# Patient Record
Sex: MALE | Race: OTHER | NOT HISPANIC OR LATINO | URBAN - METROPOLITAN AREA
[De-identification: names, ages, dates, MRNs, and addresses within clinical notes are randomized per-mention and may not be internally consistent; named-entity substitution may affect disease eponyms.]

---

## 2017-06-05 ENCOUNTER — OUTPATIENT (OUTPATIENT)
Dept: OUTPATIENT SERVICES | Facility: HOSPITAL | Age: 72
LOS: 1 days | Discharge: HOME | End: 2017-06-05

## 2017-06-05 ENCOUNTER — RESULT REVIEW (OUTPATIENT)
Age: 72
End: 2017-06-05

## 2017-06-05 RX ORDER — DOCUSATE SODIUM 100 MG
1 CAPSULE ORAL
Qty: 20 | Refills: 0 | OUTPATIENT
Start: 2017-06-05

## 2017-06-05 RX ORDER — SENNA PLUS 8.6 MG/1
1 TABLET ORAL
Qty: 20 | Refills: 0 | OUTPATIENT
Start: 2017-06-05

## 2017-06-07 DIAGNOSIS — D17.6 BENIGN LIPOMATOUS NEOPLASM OF SPERMATIC CORD: ICD-10-CM

## 2017-06-07 DIAGNOSIS — Z91.013 ALLERGY TO SEAFOOD: ICD-10-CM

## 2017-06-07 DIAGNOSIS — Z88.0 ALLERGY STATUS TO PENICILLIN: ICD-10-CM

## 2017-06-07 DIAGNOSIS — K21.9 GASTRO-ESOPHAGEAL REFLUX DISEASE WITHOUT ESOPHAGITIS: ICD-10-CM

## 2017-06-07 DIAGNOSIS — I10 ESSENTIAL (PRIMARY) HYPERTENSION: ICD-10-CM

## 2017-06-07 DIAGNOSIS — Z91.018 ALLERGY TO OTHER FOODS: ICD-10-CM

## 2017-06-07 DIAGNOSIS — K40.90 UNILATERAL INGUINAL HERNIA, WITHOUT OBSTRUCTION OR GANGRENE, NOT SPECIFIED AS RECURRENT: ICD-10-CM

## 2017-06-08 LAB — SURGICAL PATHOLOGY STUDY: SIGNIFICANT CHANGE UP

## 2022-01-01 ENCOUNTER — INPATIENT (INPATIENT)
Facility: HOSPITAL | Age: 77
LOS: 20 days | DRG: 435 | End: 2022-07-04
Attending: INTERNAL MEDICINE | Admitting: HOSPITALIST
Payer: MEDICARE

## 2022-01-01 ENCOUNTER — RESULT REVIEW (OUTPATIENT)
Age: 77
End: 2022-01-01

## 2022-01-01 VITALS
DIASTOLIC BLOOD PRESSURE: 70 MMHG | HEART RATE: 101 BPM | SYSTOLIC BLOOD PRESSURE: 100 MMHG | HEIGHT: 69 IN | WEIGHT: 199.08 LBS | TEMPERATURE: 98 F | RESPIRATION RATE: 20 BRPM | OXYGEN SATURATION: 96 %

## 2022-01-01 VITALS
RESPIRATION RATE: 22 BRPM | SYSTOLIC BLOOD PRESSURE: 128 MMHG | TEMPERATURE: 98 F | OXYGEN SATURATION: 88 % | HEART RATE: 102 BPM | DIASTOLIC BLOOD PRESSURE: 71 MMHG

## 2022-01-01 DIAGNOSIS — Z51.5 ENCOUNTER FOR PALLIATIVE CARE: ICD-10-CM

## 2022-01-01 DIAGNOSIS — Z98.890 OTHER SPECIFIED POSTPROCEDURAL STATES: Chronic | ICD-10-CM

## 2022-01-01 DIAGNOSIS — R53.81 OTHER MALAISE: ICD-10-CM

## 2022-01-01 DIAGNOSIS — C79.9 SECONDARY MALIGNANT NEOPLASM OF UNSPECIFIED SITE: ICD-10-CM

## 2022-01-01 DIAGNOSIS — Z71.89 OTHER SPECIFIED COUNSELING: ICD-10-CM

## 2022-01-01 DIAGNOSIS — R06.02 SHORTNESS OF BREATH: ICD-10-CM

## 2022-01-01 DIAGNOSIS — N17.9 ACUTE KIDNEY FAILURE, UNSPECIFIED: ICD-10-CM

## 2022-01-01 DIAGNOSIS — R79.89 OTHER SPECIFIED ABNORMAL FINDINGS OF BLOOD CHEMISTRY: ICD-10-CM

## 2022-01-01 DIAGNOSIS — I10 ESSENTIAL (PRIMARY) HYPERTENSION: ICD-10-CM

## 2022-01-01 DIAGNOSIS — I48.0 PAROXYSMAL ATRIAL FIBRILLATION: ICD-10-CM

## 2022-01-01 DIAGNOSIS — R06.00 DYSPNEA, UNSPECIFIED: ICD-10-CM

## 2022-01-01 LAB
AFP-TM SERPL-MCNC: 6.2 NG/ML — SIGNIFICANT CHANGE UP
ALBUMIN FLD-MCNC: 1.9 G/DL — SIGNIFICANT CHANGE UP
ALBUMIN SERPL ELPH-MCNC: 2.6 G/DL — LOW (ref 3.3–5)
ALBUMIN SERPL ELPH-MCNC: 3.2 G/DL — LOW (ref 3.3–5)
ALBUMIN SERPL ELPH-MCNC: 3.3 G/DL — SIGNIFICANT CHANGE UP (ref 3.3–5)
ALBUMIN SERPL ELPH-MCNC: 3.4 G/DL — SIGNIFICANT CHANGE UP (ref 3.3–5)
ALBUMIN SERPL ELPH-MCNC: 3.5 G/DL — SIGNIFICANT CHANGE UP (ref 3.3–5)
ALBUMIN SERPL ELPH-MCNC: 3.7 G/DL — SIGNIFICANT CHANGE UP (ref 3.3–5)
ALBUMIN SERPL ELPH-MCNC: 3.9 G/DL — SIGNIFICANT CHANGE UP (ref 3.3–5)
ALBUMIN SERPL ELPH-MCNC: 4 G/DL — SIGNIFICANT CHANGE UP (ref 3.3–5)
ALBUMIN SERPL ELPH-MCNC: 4.2 G/DL — SIGNIFICANT CHANGE UP (ref 3.3–5)
ALBUMIN SERPL ELPH-MCNC: 4.4 G/DL — SIGNIFICANT CHANGE UP (ref 3.3–5)
ALBUMIN SERPL ELPH-MCNC: 4.4 G/DL — SIGNIFICANT CHANGE UP (ref 3.3–5)
ALBUMIN SERPL ELPH-MCNC: 4.5 G/DL — SIGNIFICANT CHANGE UP (ref 3.3–5)
ALBUMIN SERPL ELPH-MCNC: 4.7 G/DL — SIGNIFICANT CHANGE UP (ref 3.3–5)
ALBUMIN SERPL ELPH-MCNC: 4.8 G/DL — SIGNIFICANT CHANGE UP (ref 3.3–5)
ALP SERPL-CCNC: 340 U/L — HIGH (ref 40–120)
ALP SERPL-CCNC: 419 U/L — HIGH (ref 40–120)
ALP SERPL-CCNC: 511 U/L — HIGH (ref 40–120)
ALP SERPL-CCNC: 543 U/L — HIGH (ref 40–120)
ALP SERPL-CCNC: 594 U/L — HIGH (ref 40–120)
ALP SERPL-CCNC: 597 U/L — HIGH (ref 40–120)
ALP SERPL-CCNC: 624 U/L — HIGH (ref 40–120)
ALP SERPL-CCNC: 644 U/L — HIGH (ref 40–120)
ALP SERPL-CCNC: 644 U/L — HIGH (ref 40–120)
ALP SERPL-CCNC: 646 U/L — HIGH (ref 40–120)
ALP SERPL-CCNC: 655 U/L — HIGH (ref 40–120)
ALP SERPL-CCNC: 657 U/L — HIGH (ref 40–120)
ALP SERPL-CCNC: 675 U/L — HIGH (ref 40–120)
ALP SERPL-CCNC: 690 U/L — HIGH (ref 40–120)
ALP SERPL-CCNC: 707 U/L — HIGH (ref 40–120)
ALP SERPL-CCNC: 711 U/L — HIGH (ref 40–120)
ALP SERPL-CCNC: 713 U/L — HIGH (ref 40–120)
ALP SERPL-CCNC: 727 U/L — HIGH (ref 40–120)
ALT FLD-CCNC: 105 U/L — HIGH (ref 10–45)
ALT FLD-CCNC: 106 U/L — HIGH (ref 10–45)
ALT FLD-CCNC: 117 U/L — HIGH (ref 10–45)
ALT FLD-CCNC: 122 U/L — HIGH (ref 10–45)
ALT FLD-CCNC: 58 U/L — HIGH (ref 10–45)
ALT FLD-CCNC: 66 U/L — HIGH (ref 10–45)
ALT FLD-CCNC: 67 U/L — HIGH (ref 10–45)
ALT FLD-CCNC: 69 U/L — HIGH (ref 10–45)
ALT FLD-CCNC: 71 U/L — HIGH (ref 10–45)
ALT FLD-CCNC: 72 U/L — HIGH (ref 10–45)
ALT FLD-CCNC: 74 U/L — HIGH (ref 10–45)
ALT FLD-CCNC: 79 U/L — HIGH (ref 10–45)
ALT FLD-CCNC: 80 U/L — HIGH (ref 10–45)
ALT FLD-CCNC: 80 U/L — HIGH (ref 10–45)
ALT FLD-CCNC: 86 U/L — HIGH (ref 10–45)
ALT FLD-CCNC: 93 U/L — HIGH (ref 10–45)
ALT FLD-CCNC: 96 U/L — HIGH (ref 10–45)
ALT FLD-CCNC: 97 U/L — HIGH (ref 10–45)
ANA TITR SER: NEGATIVE — SIGNIFICANT CHANGE UP
ANION GAP SERPL CALC-SCNC: 12 MMOL/L — SIGNIFICANT CHANGE UP (ref 5–17)
ANION GAP SERPL CALC-SCNC: 13 MMOL/L — SIGNIFICANT CHANGE UP (ref 5–17)
ANION GAP SERPL CALC-SCNC: 13 MMOL/L — SIGNIFICANT CHANGE UP (ref 5–17)
ANION GAP SERPL CALC-SCNC: 14 MMOL/L — SIGNIFICANT CHANGE UP (ref 5–17)
ANION GAP SERPL CALC-SCNC: 14 MMOL/L — SIGNIFICANT CHANGE UP (ref 5–17)
ANION GAP SERPL CALC-SCNC: 15 MMOL/L — SIGNIFICANT CHANGE UP (ref 5–17)
ANION GAP SERPL CALC-SCNC: 16 MMOL/L — SIGNIFICANT CHANGE UP (ref 5–17)
ANION GAP SERPL CALC-SCNC: 17 MMOL/L — SIGNIFICANT CHANGE UP (ref 5–17)
ANION GAP SERPL CALC-SCNC: 19 MMOL/L — HIGH (ref 5–17)
ANION GAP SERPL CALC-SCNC: 20 MMOL/L — HIGH (ref 5–17)
ANION GAP SERPL CALC-SCNC: 21 MMOL/L — HIGH (ref 5–17)
ANION GAP SERPL CALC-SCNC: 22 MMOL/L — HIGH (ref 5–17)
ANION GAP SERPL CALC-SCNC: 24 MMOL/L — HIGH (ref 5–17)
ANION GAP SERPL CALC-SCNC: 24 MMOL/L — HIGH (ref 5–17)
APPEARANCE UR: ABNORMAL
APTT BLD: 29.6 SEC — SIGNIFICANT CHANGE UP (ref 27.5–35.5)
APTT BLD: 37.3 SEC — HIGH (ref 27.5–35.5)
AST SERPL-CCNC: 107 U/L — HIGH (ref 10–40)
AST SERPL-CCNC: 111 U/L — HIGH (ref 10–40)
AST SERPL-CCNC: 113 U/L — HIGH (ref 10–40)
AST SERPL-CCNC: 114 U/L — HIGH (ref 10–40)
AST SERPL-CCNC: 114 U/L — HIGH (ref 10–40)
AST SERPL-CCNC: 119 U/L — HIGH (ref 10–40)
AST SERPL-CCNC: 120 U/L — HIGH (ref 10–40)
AST SERPL-CCNC: 133 U/L — HIGH (ref 10–40)
AST SERPL-CCNC: 143 U/L — HIGH (ref 10–40)
AST SERPL-CCNC: 150 U/L — HIGH (ref 10–40)
AST SERPL-CCNC: 171 U/L — HIGH (ref 10–40)
AST SERPL-CCNC: 177 U/L — HIGH (ref 10–40)
AST SERPL-CCNC: 179 U/L — HIGH (ref 10–40)
AST SERPL-CCNC: 196 U/L — HIGH (ref 10–40)
AST SERPL-CCNC: 217 U/L — HIGH (ref 10–40)
AST SERPL-CCNC: 90 U/L — HIGH (ref 10–40)
AST SERPL-CCNC: 91 U/L — HIGH (ref 10–40)
AST SERPL-CCNC: 98 U/L — HIGH (ref 10–40)
B PERT IGG+IGM PNL SER: CLEAR — SIGNIFICANT CHANGE UP
BACTERIA # UR AUTO: ABNORMAL
BACTERIA # UR AUTO: NEGATIVE — SIGNIFICANT CHANGE UP
BASOPHILS # BLD AUTO: 0.02 K/UL — SIGNIFICANT CHANGE UP (ref 0–0.2)
BASOPHILS # BLD AUTO: 0.03 K/UL — SIGNIFICANT CHANGE UP (ref 0–0.2)
BASOPHILS NFR BLD AUTO: 0.1 % — SIGNIFICANT CHANGE UP (ref 0–2)
BASOPHILS NFR BLD AUTO: 0.2 % — SIGNIFICANT CHANGE UP (ref 0–2)
BILIRUB DIRECT SERPL-MCNC: 1.6 MG/DL — HIGH (ref 0–0.3)
BILIRUB SERPL-MCNC: 1.4 MG/DL — HIGH (ref 0.2–1.2)
BILIRUB SERPL-MCNC: 1.8 MG/DL — HIGH (ref 0.2–1.2)
BILIRUB SERPL-MCNC: 10.3 MG/DL — HIGH (ref 0.2–1.2)
BILIRUB SERPL-MCNC: 11 MG/DL — HIGH (ref 0.2–1.2)
BILIRUB SERPL-MCNC: 11.1 MG/DL — HIGH (ref 0.2–1.2)
BILIRUB SERPL-MCNC: 12.1 MG/DL — HIGH (ref 0.2–1.2)
BILIRUB SERPL-MCNC: 12.4 MG/DL — HIGH (ref 0.2–1.2)
BILIRUB SERPL-MCNC: 2.2 MG/DL — HIGH (ref 0.2–1.2)
BILIRUB SERPL-MCNC: 2.4 MG/DL — HIGH (ref 0.2–1.2)
BILIRUB SERPL-MCNC: 2.6 MG/DL — HIGH (ref 0.2–1.2)
BILIRUB SERPL-MCNC: 2.8 MG/DL — HIGH (ref 0.2–1.2)
BILIRUB SERPL-MCNC: 2.8 MG/DL — HIGH (ref 0.2–1.2)
BILIRUB SERPL-MCNC: 3.2 MG/DL — HIGH (ref 0.2–1.2)
BILIRUB SERPL-MCNC: 4.2 MG/DL — HIGH (ref 0.2–1.2)
BILIRUB SERPL-MCNC: 5.2 MG/DL — HIGH (ref 0.2–1.2)
BILIRUB SERPL-MCNC: 6.8 MG/DL — HIGH (ref 0.2–1.2)
BILIRUB SERPL-MCNC: 7.2 MG/DL — HIGH (ref 0.2–1.2)
BILIRUB SERPL-MCNC: 8.7 MG/DL — HIGH (ref 0.2–1.2)
BILIRUB UR-MCNC: ABNORMAL
BILIRUB UR-MCNC: NEGATIVE — SIGNIFICANT CHANGE UP
BILIRUB UR-MCNC: NEGATIVE — SIGNIFICANT CHANGE UP
BLD GP AB SCN SERPL QL: NEGATIVE — SIGNIFICANT CHANGE UP
BUN SERPL-MCNC: 101 MG/DL — HIGH (ref 7–23)
BUN SERPL-MCNC: 102 MG/DL — HIGH (ref 7–23)
BUN SERPL-MCNC: 109 MG/DL — HIGH (ref 7–23)
BUN SERPL-MCNC: 126 MG/DL — HIGH (ref 7–23)
BUN SERPL-MCNC: 51 MG/DL — HIGH (ref 7–23)
BUN SERPL-MCNC: 52 MG/DL — HIGH (ref 7–23)
BUN SERPL-MCNC: 58 MG/DL — HIGH (ref 7–23)
BUN SERPL-MCNC: 60 MG/DL — HIGH (ref 7–23)
BUN SERPL-MCNC: 67 MG/DL — HIGH (ref 7–23)
BUN SERPL-MCNC: 69 MG/DL — HIGH (ref 7–23)
BUN SERPL-MCNC: 71 MG/DL — HIGH (ref 7–23)
BUN SERPL-MCNC: 71 MG/DL — HIGH (ref 7–23)
BUN SERPL-MCNC: 72 MG/DL — HIGH (ref 7–23)
BUN SERPL-MCNC: 72 MG/DL — HIGH (ref 7–23)
BUN SERPL-MCNC: 74 MG/DL — HIGH (ref 7–23)
BUN SERPL-MCNC: 75 MG/DL — HIGH (ref 7–23)
BUN SERPL-MCNC: 77 MG/DL — HIGH (ref 7–23)
BUN SERPL-MCNC: 78 MG/DL — HIGH (ref 7–23)
BUN SERPL-MCNC: 78 MG/DL — HIGH (ref 7–23)
BUN SERPL-MCNC: 81 MG/DL — HIGH (ref 7–23)
BUN SERPL-MCNC: 94 MG/DL — HIGH (ref 7–23)
BUN SERPL-MCNC: 99 MG/DL — HIGH (ref 7–23)
CALCIUM SERPL-MCNC: 10.1 MG/DL — SIGNIFICANT CHANGE UP (ref 8.4–10.5)
CALCIUM SERPL-MCNC: 10.2 MG/DL — SIGNIFICANT CHANGE UP (ref 8.4–10.5)
CALCIUM SERPL-MCNC: 8.7 MG/DL — SIGNIFICANT CHANGE UP (ref 8.4–10.5)
CALCIUM SERPL-MCNC: 8.9 MG/DL — SIGNIFICANT CHANGE UP (ref 8.4–10.5)
CALCIUM SERPL-MCNC: 8.9 MG/DL — SIGNIFICANT CHANGE UP (ref 8.4–10.5)
CALCIUM SERPL-MCNC: 9 MG/DL — SIGNIFICANT CHANGE UP (ref 8.4–10.5)
CALCIUM SERPL-MCNC: 9.1 MG/DL — SIGNIFICANT CHANGE UP (ref 8.4–10.5)
CALCIUM SERPL-MCNC: 9.1 MG/DL — SIGNIFICANT CHANGE UP (ref 8.4–10.5)
CALCIUM SERPL-MCNC: 9.2 MG/DL — SIGNIFICANT CHANGE UP (ref 8.4–10.5)
CALCIUM SERPL-MCNC: 9.2 MG/DL — SIGNIFICANT CHANGE UP (ref 8.4–10.5)
CALCIUM SERPL-MCNC: 9.3 MG/DL — SIGNIFICANT CHANGE UP (ref 8.4–10.5)
CALCIUM SERPL-MCNC: 9.3 MG/DL — SIGNIFICANT CHANGE UP (ref 8.4–10.5)
CALCIUM SERPL-MCNC: 9.4 MG/DL — SIGNIFICANT CHANGE UP (ref 8.4–10.5)
CALCIUM SERPL-MCNC: 9.4 MG/DL — SIGNIFICANT CHANGE UP (ref 8.4–10.5)
CALCIUM SERPL-MCNC: 9.5 MG/DL — SIGNIFICANT CHANGE UP (ref 8.4–10.5)
CALCIUM SERPL-MCNC: 9.5 MG/DL — SIGNIFICANT CHANGE UP (ref 8.4–10.5)
CALCIUM SERPL-MCNC: 9.8 MG/DL — SIGNIFICANT CHANGE UP (ref 8.4–10.5)
CALCIUM SERPL-MCNC: 9.9 MG/DL — SIGNIFICANT CHANGE UP (ref 8.4–10.5)
CALCIUM SERPL-MCNC: 9.9 MG/DL — SIGNIFICANT CHANGE UP (ref 8.4–10.5)
CANCER AG19-9 SERPL-ACNC: 1602 U/ML — HIGH
CEA SERPL-MCNC: 4.8 NG/ML — HIGH (ref 0–3.8)
CHLORIDE SERPL-SCNC: 100 MMOL/L — SIGNIFICANT CHANGE UP (ref 96–108)
CHLORIDE SERPL-SCNC: 100 MMOL/L — SIGNIFICANT CHANGE UP (ref 96–108)
CHLORIDE SERPL-SCNC: 103 MMOL/L — SIGNIFICANT CHANGE UP (ref 96–108)
CHLORIDE SERPL-SCNC: 103 MMOL/L — SIGNIFICANT CHANGE UP (ref 96–108)
CHLORIDE SERPL-SCNC: 104 MMOL/L — SIGNIFICANT CHANGE UP (ref 96–108)
CHLORIDE SERPL-SCNC: 104 MMOL/L — SIGNIFICANT CHANGE UP (ref 96–108)
CHLORIDE SERPL-SCNC: 105 MMOL/L — SIGNIFICANT CHANGE UP (ref 96–108)
CHLORIDE SERPL-SCNC: 105 MMOL/L — SIGNIFICANT CHANGE UP (ref 96–108)
CHLORIDE SERPL-SCNC: 88 MMOL/L — LOW (ref 96–108)
CHLORIDE SERPL-SCNC: 89 MMOL/L — LOW (ref 96–108)
CHLORIDE SERPL-SCNC: 90 MMOL/L — LOW (ref 96–108)
CHLORIDE SERPL-SCNC: 91 MMOL/L — LOW (ref 96–108)
CHLORIDE SERPL-SCNC: 92 MMOL/L — LOW (ref 96–108)
CHLORIDE SERPL-SCNC: 93 MMOL/L — LOW (ref 96–108)
CHLORIDE SERPL-SCNC: 95 MMOL/L — LOW (ref 96–108)
CHLORIDE SERPL-SCNC: 97 MMOL/L — SIGNIFICANT CHANGE UP (ref 96–108)
CHLORIDE SERPL-SCNC: 98 MMOL/L — SIGNIFICANT CHANGE UP (ref 96–108)
CHLORIDE SERPL-SCNC: 99 MMOL/L — SIGNIFICANT CHANGE UP (ref 96–108)
CHLORIDE UR-SCNC: 66 MMOL/L — SIGNIFICANT CHANGE UP
CHLORIDE UR-SCNC: <20 MMOL/L — SIGNIFICANT CHANGE UP
CMV DNA CSF QL NAA+PROBE: SIGNIFICANT CHANGE UP
CMV DNA SPEC NAA+PROBE-LOG#: SIGNIFICANT CHANGE UP LOG10IU/ML
CO2 SERPL-SCNC: 14 MMOL/L — LOW (ref 22–31)
CO2 SERPL-SCNC: 15 MMOL/L — LOW (ref 22–31)
CO2 SERPL-SCNC: 17 MMOL/L — LOW (ref 22–31)
CO2 SERPL-SCNC: 17 MMOL/L — LOW (ref 22–31)
CO2 SERPL-SCNC: 18 MMOL/L — LOW (ref 22–31)
CO2 SERPL-SCNC: 18 MMOL/L — LOW (ref 22–31)
CO2 SERPL-SCNC: 19 MMOL/L — LOW (ref 22–31)
CO2 SERPL-SCNC: 20 MMOL/L — LOW (ref 22–31)
CO2 SERPL-SCNC: 21 MMOL/L — LOW (ref 22–31)
CO2 SERPL-SCNC: 22 MMOL/L — SIGNIFICANT CHANGE UP (ref 22–31)
COLOR FLD: YELLOW — SIGNIFICANT CHANGE UP
COLOR SPEC: ABNORMAL
COLOR SPEC: YELLOW — SIGNIFICANT CHANGE UP
CREAT ?TM UR-MCNC: 102 MG/DL — SIGNIFICANT CHANGE UP
CREAT ?TM UR-MCNC: 116 MG/DL — SIGNIFICANT CHANGE UP
CREAT ?TM UR-MCNC: 72 MG/DL — SIGNIFICANT CHANGE UP
CREAT ?TM UR-MCNC: 81 MG/DL — SIGNIFICANT CHANGE UP
CREAT ?TM UR-MCNC: 91 MG/DL — SIGNIFICANT CHANGE UP
CREAT ?TM UR-MCNC: 91 MG/DL — SIGNIFICANT CHANGE UP
CREAT SERPL-MCNC: 1.75 MG/DL — HIGH (ref 0.5–1.3)
CREAT SERPL-MCNC: 1.77 MG/DL — HIGH (ref 0.5–1.3)
CREAT SERPL-MCNC: 1.97 MG/DL — HIGH (ref 0.5–1.3)
CREAT SERPL-MCNC: 2.06 MG/DL — HIGH (ref 0.5–1.3)
CREAT SERPL-MCNC: 2.06 MG/DL — HIGH (ref 0.5–1.3)
CREAT SERPL-MCNC: 2.13 MG/DL — HIGH (ref 0.5–1.3)
CREAT SERPL-MCNC: 2.17 MG/DL — HIGH (ref 0.5–1.3)
CREAT SERPL-MCNC: 2.37 MG/DL — HIGH (ref 0.5–1.3)
CREAT SERPL-MCNC: 2.41 MG/DL — HIGH (ref 0.5–1.3)
CREAT SERPL-MCNC: 2.68 MG/DL — HIGH (ref 0.5–1.3)
CREAT SERPL-MCNC: 2.71 MG/DL — HIGH (ref 0.5–1.3)
CREAT SERPL-MCNC: 2.75 MG/DL — HIGH (ref 0.5–1.3)
CREAT SERPL-MCNC: 2.77 MG/DL — HIGH (ref 0.5–1.3)
CREAT SERPL-MCNC: 2.8 MG/DL — HIGH (ref 0.5–1.3)
CREAT SERPL-MCNC: 2.84 MG/DL — HIGH (ref 0.5–1.3)
CREAT SERPL-MCNC: 2.87 MG/DL — HIGH (ref 0.5–1.3)
CREAT SERPL-MCNC: 3.08 MG/DL — HIGH (ref 0.5–1.3)
CREAT SERPL-MCNC: 3.12 MG/DL — HIGH (ref 0.5–1.3)
CREAT SERPL-MCNC: 3.16 MG/DL — HIGH (ref 0.5–1.3)
CREAT SERPL-MCNC: 3.37 MG/DL — HIGH (ref 0.5–1.3)
CREAT SERPL-MCNC: 3.62 MG/DL — HIGH (ref 0.5–1.3)
CREAT SERPL-MCNC: 3.65 MG/DL — HIGH (ref 0.5–1.3)
CULTURE RESULTS: SIGNIFICANT CHANGE UP
DIFF PNL FLD: NEGATIVE — SIGNIFICANT CHANGE UP
EBV EA AB SER IA-ACNC: <5 U/ML — SIGNIFICANT CHANGE UP
EBV EA AB TITR SER IF: POSITIVE
EBV EA IGG SER-ACNC: NEGATIVE — SIGNIFICANT CHANGE UP
EBV NA IGG SER IA-ACNC: >600 U/ML — HIGH
EBV PATRN SPEC IB-IMP: SIGNIFICANT CHANGE UP
EBV VCA IGG AVIDITY SER QL IA: POSITIVE
EBV VCA IGM SER IA-ACNC: 380 U/ML — HIGH
EBV VCA IGM SER IA-ACNC: <10 U/ML — SIGNIFICANT CHANGE UP
EBV VCA IGM TITR FLD: NEGATIVE — SIGNIFICANT CHANGE UP
EGFR: 16 ML/MIN/1.73M2 — LOW
EGFR: 17 ML/MIN/1.73M2 — LOW
EGFR: 18 ML/MIN/1.73M2 — LOW
EGFR: 19 ML/MIN/1.73M2 — LOW
EGFR: 20 ML/MIN/1.73M2 — LOW
EGFR: 20 ML/MIN/1.73M2 — LOW
EGFR: 22 ML/MIN/1.73M2 — LOW
EGFR: 22 ML/MIN/1.73M2 — LOW
EGFR: 23 ML/MIN/1.73M2 — LOW
EGFR: 24 ML/MIN/1.73M2 — LOW
EGFR: 27 ML/MIN/1.73M2 — LOW
EGFR: 28 ML/MIN/1.73M2 — LOW
EGFR: 31 ML/MIN/1.73M2 — LOW
EGFR: 31 ML/MIN/1.73M2 — LOW
EGFR: 33 ML/MIN/1.73M2 — LOW
EGFR: 33 ML/MIN/1.73M2 — LOW
EGFR: 34 ML/MIN/1.73M2 — LOW
EGFR: 39 ML/MIN/1.73M2 — LOW
EGFR: 40 ML/MIN/1.73M2 — LOW
EOSINOPHIL # BLD AUTO: 0.01 K/UL — SIGNIFICANT CHANGE UP (ref 0–0.5)
EOSINOPHIL # BLD AUTO: 0.09 K/UL — SIGNIFICANT CHANGE UP (ref 0–0.5)
EOSINOPHIL NFR BLD AUTO: 0.1 % — SIGNIFICANT CHANGE UP (ref 0–6)
EOSINOPHIL NFR BLD AUTO: 0.6 % — SIGNIFICANT CHANGE UP (ref 0–6)
EPI CELLS # UR: 0 /HPF — SIGNIFICANT CHANGE UP
EPI CELLS # UR: 1 /HPF — SIGNIFICANT CHANGE UP
EPI CELLS # UR: 1 /HPF — SIGNIFICANT CHANGE UP
FLUAV AG NPH QL: SIGNIFICANT CHANGE UP
FLUBV AG NPH QL: SIGNIFICANT CHANGE UP
FLUID INTAKE SUBSTANCE CLASS: SIGNIFICANT CHANGE UP
GLUCOSE BLDC GLUCOMTR-MCNC: 130 MG/DL — HIGH (ref 70–99)
GLUCOSE BLDC GLUCOMTR-MCNC: 132 MG/DL — HIGH (ref 70–99)
GLUCOSE BLDC GLUCOMTR-MCNC: 134 MG/DL — HIGH (ref 70–99)
GLUCOSE BLDC GLUCOMTR-MCNC: 135 MG/DL — HIGH (ref 70–99)
GLUCOSE BLDC GLUCOMTR-MCNC: 185 MG/DL — HIGH (ref 70–99)
GLUCOSE FLD-MCNC: 133 MG/DL — SIGNIFICANT CHANGE UP
GLUCOSE SERPL-MCNC: 111 MG/DL — HIGH (ref 70–99)
GLUCOSE SERPL-MCNC: 112 MG/DL — HIGH (ref 70–99)
GLUCOSE SERPL-MCNC: 116 MG/DL — HIGH (ref 70–99)
GLUCOSE SERPL-MCNC: 116 MG/DL — HIGH (ref 70–99)
GLUCOSE SERPL-MCNC: 118 MG/DL — HIGH (ref 70–99)
GLUCOSE SERPL-MCNC: 122 MG/DL — HIGH (ref 70–99)
GLUCOSE SERPL-MCNC: 123 MG/DL — HIGH (ref 70–99)
GLUCOSE SERPL-MCNC: 124 MG/DL — HIGH (ref 70–99)
GLUCOSE SERPL-MCNC: 125 MG/DL — HIGH (ref 70–99)
GLUCOSE SERPL-MCNC: 128 MG/DL — HIGH (ref 70–99)
GLUCOSE SERPL-MCNC: 130 MG/DL — HIGH (ref 70–99)
GLUCOSE SERPL-MCNC: 133 MG/DL — HIGH (ref 70–99)
GLUCOSE SERPL-MCNC: 147 MG/DL — HIGH (ref 70–99)
GLUCOSE SERPL-MCNC: 185 MG/DL — HIGH (ref 70–99)
GLUCOSE SERPL-MCNC: 210 MG/DL — HIGH (ref 70–99)
GLUCOSE SERPL-MCNC: 75 MG/DL — SIGNIFICANT CHANGE UP (ref 70–99)
GLUCOSE SERPL-MCNC: 79 MG/DL — SIGNIFICANT CHANGE UP (ref 70–99)
GLUCOSE SERPL-MCNC: 91 MG/DL — SIGNIFICANT CHANGE UP (ref 70–99)
GLUCOSE SERPL-MCNC: 93 MG/DL — SIGNIFICANT CHANGE UP (ref 70–99)
GLUCOSE SERPL-MCNC: 94 MG/DL — SIGNIFICANT CHANGE UP (ref 70–99)
GLUCOSE SERPL-MCNC: 96 MG/DL — SIGNIFICANT CHANGE UP (ref 70–99)
GLUCOSE SERPL-MCNC: 99 MG/DL — SIGNIFICANT CHANGE UP (ref 70–99)
GLUCOSE UR QL: ABNORMAL
GLUCOSE UR QL: NEGATIVE — SIGNIFICANT CHANGE UP
GRAM STN FLD: SIGNIFICANT CHANGE UP
GRAN CASTS # UR COMP ASSIST: 2 /LPF — SIGNIFICANT CHANGE UP
HAV IGM SER-ACNC: SIGNIFICANT CHANGE UP
HAV IGM SER-ACNC: SIGNIFICANT CHANGE UP
HBV CORE IGM SER-ACNC: SIGNIFICANT CHANGE UP
HBV CORE IGM SER-ACNC: SIGNIFICANT CHANGE UP
HBV SURFACE AG SER-ACNC: SIGNIFICANT CHANGE UP
HBV SURFACE AG SER-ACNC: SIGNIFICANT CHANGE UP
HCT VFR BLD CALC: 27.9 % — LOW (ref 39–50)
HCT VFR BLD CALC: 29.6 % — LOW (ref 39–50)
HCT VFR BLD CALC: 29.7 % — LOW (ref 39–50)
HCT VFR BLD CALC: 31.1 % — LOW (ref 39–50)
HCT VFR BLD CALC: 32.4 % — LOW (ref 39–50)
HCT VFR BLD CALC: 33.2 % — LOW (ref 39–50)
HCT VFR BLD CALC: 33.3 % — LOW (ref 39–50)
HCT VFR BLD CALC: 33.5 % — LOW (ref 39–50)
HCT VFR BLD CALC: 33.6 % — LOW (ref 39–50)
HCT VFR BLD CALC: 33.8 % — LOW (ref 39–50)
HCT VFR BLD CALC: 34.2 % — LOW (ref 39–50)
HCT VFR BLD CALC: 34.5 % — LOW (ref 39–50)
HCT VFR BLD CALC: 34.6 % — LOW (ref 39–50)
HCT VFR BLD CALC: 34.7 % — LOW (ref 39–50)
HCT VFR BLD CALC: 35 % — LOW (ref 39–50)
HCT VFR BLD CALC: 35.9 % — LOW (ref 39–50)
HCT VFR BLD CALC: 36.3 % — LOW (ref 39–50)
HCT VFR BLD CALC: 37.2 % — LOW (ref 39–50)
HCV AB S/CO SERPL IA: 0.08 S/CO — SIGNIFICANT CHANGE UP (ref 0–0.99)
HCV AB S/CO SERPL IA: 0.12 S/CO — SIGNIFICANT CHANGE UP (ref 0–0.99)
HCV AB SERPL-IMP: SIGNIFICANT CHANGE UP
HCV AB SERPL-IMP: SIGNIFICANT CHANGE UP
HGB BLD-MCNC: 10 G/DL — LOW (ref 13–17)
HGB BLD-MCNC: 10.1 G/DL — LOW (ref 13–17)
HGB BLD-MCNC: 10.6 G/DL — LOW (ref 13–17)
HGB BLD-MCNC: 10.9 G/DL — LOW (ref 13–17)
HGB BLD-MCNC: 11 G/DL — LOW (ref 13–17)
HGB BLD-MCNC: 11 G/DL — LOW (ref 13–17)
HGB BLD-MCNC: 11.1 G/DL — LOW (ref 13–17)
HGB BLD-MCNC: 11.2 G/DL — LOW (ref 13–17)
HGB BLD-MCNC: 11.2 G/DL — LOW (ref 13–17)
HGB BLD-MCNC: 11.4 G/DL — LOW (ref 13–17)
HGB BLD-MCNC: 11.5 G/DL — LOW (ref 13–17)
HGB BLD-MCNC: 11.6 G/DL — LOW (ref 13–17)
HGB BLD-MCNC: 11.8 G/DL — LOW (ref 13–17)
HGB BLD-MCNC: 12.1 G/DL — LOW (ref 13–17)
HGB BLD-MCNC: 9.2 G/DL — LOW (ref 13–17)
HGB BLD-MCNC: 9.7 G/DL — LOW (ref 13–17)
HSV DNA1: SIGNIFICANT CHANGE UP
HSV DNA2: SIGNIFICANT CHANGE UP
HSV1 DNA BLD QL NAA+PROBE: SIGNIFICANT CHANGE UP
HSV2 DNA BLD QL NAA+PROBE: SIGNIFICANT CHANGE UP
HYALINE CASTS # UR AUTO: 1 /LPF — SIGNIFICANT CHANGE UP (ref 0–7)
HYALINE CASTS # UR AUTO: 18 /LPF — HIGH (ref 0–2)
HYALINE CASTS # UR AUTO: 23 /LPF — HIGH (ref 0–2)
HYALINE CASTS # UR AUTO: 4 /LPF — HIGH (ref 0–2)
HYALINE CASTS # UR AUTO: 6 /LPF — SIGNIFICANT CHANGE UP (ref 0–7)
IMM GRANULOCYTES NFR BLD AUTO: 0.6 % — SIGNIFICANT CHANGE UP (ref 0–1.5)
IMM GRANULOCYTES NFR BLD AUTO: 0.9 % — SIGNIFICANT CHANGE UP (ref 0–1.5)
INR BLD: 1.4 RATIO — HIGH (ref 0.88–1.16)
INR BLD: 1.42 RATIO — HIGH (ref 0.88–1.16)
INR BLD: 1.43 RATIO — HIGH (ref 0.88–1.16)
INR BLD: 1.52 RATIO — HIGH (ref 0.88–1.16)
INR BLD: 1.58 RATIO — HIGH (ref 0.88–1.16)
INR BLD: 1.66 RATIO — HIGH (ref 0.88–1.16)
INR BLD: 1.67 RATIO — HIGH (ref 0.88–1.16)
INR BLD: 2.51 RATIO — HIGH (ref 0.88–1.16)
INR BLD: 3.01 RATIO — HIGH (ref 0.88–1.16)
INR BLD: 3.32 RATIO — HIGH (ref 0.88–1.16)
INR BLD: 3.64 RATIO — HIGH (ref 0.88–1.16)
INR BLD: 3.96 RATIO — HIGH (ref 0.88–1.16)
KETONES UR-MCNC: NEGATIVE — SIGNIFICANT CHANGE UP
LDH SERPL L TO P-CCNC: 278 U/L — SIGNIFICANT CHANGE UP
LEUKOCYTE ESTERASE UR-ACNC: NEGATIVE — SIGNIFICANT CHANGE UP
LYMPHOCYTES # BLD AUTO: 0.48 K/UL — LOW (ref 1–3.3)
LYMPHOCYTES # BLD AUTO: 0.64 K/UL — LOW (ref 1–3.3)
LYMPHOCYTES # BLD AUTO: 2.9 % — LOW (ref 13–44)
LYMPHOCYTES # BLD AUTO: 3.9 % — LOW (ref 13–44)
LYMPHOCYTES # FLD: 88 % — SIGNIFICANT CHANGE UP
MAGNESIUM SERPL-MCNC: 2.1 MG/DL — SIGNIFICANT CHANGE UP (ref 1.6–2.6)
MAGNESIUM SERPL-MCNC: 2.2 MG/DL — SIGNIFICANT CHANGE UP (ref 1.6–2.6)
MAGNESIUM SERPL-MCNC: 2.2 MG/DL — SIGNIFICANT CHANGE UP (ref 1.6–2.6)
MCHC RBC-ENTMCNC: 30 PG — SIGNIFICANT CHANGE UP (ref 27–34)
MCHC RBC-ENTMCNC: 30.1 PG — SIGNIFICANT CHANGE UP (ref 27–34)
MCHC RBC-ENTMCNC: 30.2 PG — SIGNIFICANT CHANGE UP (ref 27–34)
MCHC RBC-ENTMCNC: 30.3 PG — SIGNIFICANT CHANGE UP (ref 27–34)
MCHC RBC-ENTMCNC: 30.4 PG — SIGNIFICANT CHANGE UP (ref 27–34)
MCHC RBC-ENTMCNC: 30.4 PG — SIGNIFICANT CHANGE UP (ref 27–34)
MCHC RBC-ENTMCNC: 30.5 PG — SIGNIFICANT CHANGE UP (ref 27–34)
MCHC RBC-ENTMCNC: 30.6 PG — SIGNIFICANT CHANGE UP (ref 27–34)
MCHC RBC-ENTMCNC: 30.6 PG — SIGNIFICANT CHANGE UP (ref 27–34)
MCHC RBC-ENTMCNC: 30.7 PG — SIGNIFICANT CHANGE UP (ref 27–34)
MCHC RBC-ENTMCNC: 30.7 PG — SIGNIFICANT CHANGE UP (ref 27–34)
MCHC RBC-ENTMCNC: 30.8 PG — SIGNIFICANT CHANGE UP (ref 27–34)
MCHC RBC-ENTMCNC: 30.9 PG — SIGNIFICANT CHANGE UP (ref 27–34)
MCHC RBC-ENTMCNC: 32.3 GM/DL — SIGNIFICANT CHANGE UP (ref 32–36)
MCHC RBC-ENTMCNC: 32.5 GM/DL — SIGNIFICANT CHANGE UP (ref 32–36)
MCHC RBC-ENTMCNC: 32.7 GM/DL — SIGNIFICANT CHANGE UP (ref 32–36)
MCHC RBC-ENTMCNC: 32.8 GM/DL — SIGNIFICANT CHANGE UP (ref 32–36)
MCHC RBC-ENTMCNC: 32.8 GM/DL — SIGNIFICANT CHANGE UP (ref 32–36)
MCHC RBC-ENTMCNC: 32.9 GM/DL — SIGNIFICANT CHANGE UP (ref 32–36)
MCHC RBC-ENTMCNC: 33 GM/DL — SIGNIFICANT CHANGE UP (ref 32–36)
MCHC RBC-ENTMCNC: 33.1 GM/DL — SIGNIFICANT CHANGE UP (ref 32–36)
MCHC RBC-ENTMCNC: 33.3 GM/DL — SIGNIFICANT CHANGE UP (ref 32–36)
MCHC RBC-ENTMCNC: 33.4 GM/DL — SIGNIFICANT CHANGE UP (ref 32–36)
MCHC RBC-ENTMCNC: 33.7 GM/DL — SIGNIFICANT CHANGE UP (ref 32–36)
MCV RBC AUTO: 91.4 FL — SIGNIFICANT CHANGE UP (ref 80–100)
MCV RBC AUTO: 91.4 FL — SIGNIFICANT CHANGE UP (ref 80–100)
MCV RBC AUTO: 91.8 FL — SIGNIFICANT CHANGE UP (ref 80–100)
MCV RBC AUTO: 91.8 FL — SIGNIFICANT CHANGE UP (ref 80–100)
MCV RBC AUTO: 92 FL — SIGNIFICANT CHANGE UP (ref 80–100)
MCV RBC AUTO: 92 FL — SIGNIFICANT CHANGE UP (ref 80–100)
MCV RBC AUTO: 92.2 FL — SIGNIFICANT CHANGE UP (ref 80–100)
MCV RBC AUTO: 92.2 FL — SIGNIFICANT CHANGE UP (ref 80–100)
MCV RBC AUTO: 92.5 FL — SIGNIFICANT CHANGE UP (ref 80–100)
MCV RBC AUTO: 92.8 FL — SIGNIFICANT CHANGE UP (ref 80–100)
MCV RBC AUTO: 92.8 FL — SIGNIFICANT CHANGE UP (ref 80–100)
MCV RBC AUTO: 93 FL — SIGNIFICANT CHANGE UP (ref 80–100)
MCV RBC AUTO: 93.1 FL — SIGNIFICANT CHANGE UP (ref 80–100)
MCV RBC AUTO: 93.1 FL — SIGNIFICANT CHANGE UP (ref 80–100)
MCV RBC AUTO: 93.7 FL — SIGNIFICANT CHANGE UP (ref 80–100)
MCV RBC AUTO: 93.7 FL — SIGNIFICANT CHANGE UP (ref 80–100)
MCV RBC AUTO: 94 FL — SIGNIFICANT CHANGE UP (ref 80–100)
MCV RBC AUTO: 94 FL — SIGNIFICANT CHANGE UP (ref 80–100)
MESOTHL CELL # FLD: 7 % — SIGNIFICANT CHANGE UP
MITOCHONDRIA AB SER-ACNC: SIGNIFICANT CHANGE UP
MONOCYTES # BLD AUTO: 1.11 K/UL — HIGH (ref 0–0.9)
MONOCYTES # BLD AUTO: 1.16 K/UL — HIGH (ref 0–0.9)
MONOCYTES NFR BLD AUTO: 6.8 % — SIGNIFICANT CHANGE UP (ref 2–14)
MONOCYTES NFR BLD AUTO: 7.1 % — SIGNIFICANT CHANGE UP (ref 2–14)
MONOS+MACROS # FLD: 5 % — SIGNIFICANT CHANGE UP
MRSA PCR RESULT.: SIGNIFICANT CHANGE UP
NEUTROPHILS # BLD AUTO: 14.4 K/UL — HIGH (ref 1.8–7.4)
NEUTROPHILS # BLD AUTO: 14.42 K/UL — HIGH (ref 1.8–7.4)
NEUTROPHILS NFR BLD AUTO: 88.4 % — HIGH (ref 43–77)
NEUTROPHILS NFR BLD AUTO: 88.4 % — HIGH (ref 43–77)
NEUTROPHILS-BODY FLUID: 2 % — SIGNIFICANT CHANGE UP
NITRITE UR-MCNC: NEGATIVE — SIGNIFICANT CHANGE UP
NON-GYNECOLOGICAL CYTOLOGY STUDY: SIGNIFICANT CHANGE UP
NON-GYNECOLOGICAL CYTOLOGY STUDY: SIGNIFICANT CHANGE UP
NRBC # BLD: 0 /100 WBCS — SIGNIFICANT CHANGE UP (ref 0–0)
NT-PROBNP SERPL-SCNC: 8026 PG/ML — HIGH (ref 0–300)
OSMOLALITY UR: 385 MOS/KG — SIGNIFICANT CHANGE UP (ref 300–900)
OSMOLALITY UR: 397 MOS/KG — SIGNIFICANT CHANGE UP (ref 300–900)
OSMOLALITY UR: 428 MOS/KG — SIGNIFICANT CHANGE UP (ref 300–900)
OSMOLALITY UR: 569 MOS/KG — SIGNIFICANT CHANGE UP (ref 300–900)
OTHER CELLS FLD MANUAL: 5 % — SIGNIFICANT CHANGE UP
PH UR: 5 — SIGNIFICANT CHANGE UP (ref 5–8)
PH UR: 5.5 — SIGNIFICANT CHANGE UP (ref 5–8)
PHOSPHATE SERPL-MCNC: 2.3 MG/DL — LOW (ref 2.5–4.5)
PHOSPHATE SERPL-MCNC: 2.5 MG/DL — SIGNIFICANT CHANGE UP (ref 2.5–4.5)
PHOSPHATE SERPL-MCNC: 3.5 MG/DL — SIGNIFICANT CHANGE UP (ref 2.5–4.5)
PHOSPHATE SERPL-MCNC: 4 MG/DL — SIGNIFICANT CHANGE UP (ref 2.5–4.5)
PLATELET # BLD AUTO: 168 K/UL — SIGNIFICANT CHANGE UP (ref 150–400)
PLATELET # BLD AUTO: 170 K/UL — SIGNIFICANT CHANGE UP (ref 150–400)
PLATELET # BLD AUTO: 176 K/UL — SIGNIFICANT CHANGE UP (ref 150–400)
PLATELET # BLD AUTO: 177 K/UL — SIGNIFICANT CHANGE UP (ref 150–400)
PLATELET # BLD AUTO: 179 K/UL — SIGNIFICANT CHANGE UP (ref 150–400)
PLATELET # BLD AUTO: 182 K/UL — SIGNIFICANT CHANGE UP (ref 150–400)
PLATELET # BLD AUTO: 187 K/UL — SIGNIFICANT CHANGE UP (ref 150–400)
PLATELET # BLD AUTO: 193 K/UL — SIGNIFICANT CHANGE UP (ref 150–400)
PLATELET # BLD AUTO: 202 K/UL — SIGNIFICANT CHANGE UP (ref 150–400)
PLATELET # BLD AUTO: 204 K/UL — SIGNIFICANT CHANGE UP (ref 150–400)
PLATELET # BLD AUTO: 213 K/UL — SIGNIFICANT CHANGE UP (ref 150–400)
PLATELET # BLD AUTO: 219 K/UL — SIGNIFICANT CHANGE UP (ref 150–400)
PLATELET # BLD AUTO: 223 K/UL — SIGNIFICANT CHANGE UP (ref 150–400)
PLATELET # BLD AUTO: 224 K/UL — SIGNIFICANT CHANGE UP (ref 150–400)
PLATELET # BLD AUTO: 224 K/UL — SIGNIFICANT CHANGE UP (ref 150–400)
PLATELET # BLD AUTO: 231 K/UL — SIGNIFICANT CHANGE UP (ref 150–400)
PLATELET # BLD AUTO: 239 K/UL — SIGNIFICANT CHANGE UP (ref 150–400)
PLATELET # BLD AUTO: 273 K/UL — SIGNIFICANT CHANGE UP (ref 150–400)
POTASSIUM SERPL-MCNC: 4.3 MMOL/L — SIGNIFICANT CHANGE UP (ref 3.5–5.3)
POTASSIUM SERPL-MCNC: 4.4 MMOL/L — SIGNIFICANT CHANGE UP (ref 3.5–5.3)
POTASSIUM SERPL-MCNC: 4.4 MMOL/L — SIGNIFICANT CHANGE UP (ref 3.5–5.3)
POTASSIUM SERPL-MCNC: 4.5 MMOL/L — SIGNIFICANT CHANGE UP (ref 3.5–5.3)
POTASSIUM SERPL-MCNC: 4.6 MMOL/L — SIGNIFICANT CHANGE UP (ref 3.5–5.3)
POTASSIUM SERPL-MCNC: 4.7 MMOL/L — SIGNIFICANT CHANGE UP (ref 3.5–5.3)
POTASSIUM SERPL-MCNC: 4.7 MMOL/L — SIGNIFICANT CHANGE UP (ref 3.5–5.3)
POTASSIUM SERPL-MCNC: 4.8 MMOL/L — SIGNIFICANT CHANGE UP (ref 3.5–5.3)
POTASSIUM SERPL-MCNC: 5.2 MMOL/L — SIGNIFICANT CHANGE UP (ref 3.5–5.3)
POTASSIUM SERPL-MCNC: 5.3 MMOL/L — SIGNIFICANT CHANGE UP (ref 3.5–5.3)
POTASSIUM SERPL-MCNC: 5.4 MMOL/L — HIGH (ref 3.5–5.3)
POTASSIUM SERPL-MCNC: 5.6 MMOL/L — HIGH (ref 3.5–5.3)
POTASSIUM SERPL-MCNC: 5.7 MMOL/L — HIGH (ref 3.5–5.3)
POTASSIUM SERPL-MCNC: 5.7 MMOL/L — HIGH (ref 3.5–5.3)
POTASSIUM SERPL-MCNC: 5.8 MMOL/L — HIGH (ref 3.5–5.3)
POTASSIUM SERPL-MCNC: 5.9 MMOL/L — HIGH (ref 3.5–5.3)
POTASSIUM SERPL-SCNC: 4.3 MMOL/L — SIGNIFICANT CHANGE UP (ref 3.5–5.3)
POTASSIUM SERPL-SCNC: 4.4 MMOL/L — SIGNIFICANT CHANGE UP (ref 3.5–5.3)
POTASSIUM SERPL-SCNC: 4.4 MMOL/L — SIGNIFICANT CHANGE UP (ref 3.5–5.3)
POTASSIUM SERPL-SCNC: 4.5 MMOL/L — SIGNIFICANT CHANGE UP (ref 3.5–5.3)
POTASSIUM SERPL-SCNC: 4.6 MMOL/L — SIGNIFICANT CHANGE UP (ref 3.5–5.3)
POTASSIUM SERPL-SCNC: 4.7 MMOL/L — SIGNIFICANT CHANGE UP (ref 3.5–5.3)
POTASSIUM SERPL-SCNC: 4.7 MMOL/L — SIGNIFICANT CHANGE UP (ref 3.5–5.3)
POTASSIUM SERPL-SCNC: 4.8 MMOL/L — SIGNIFICANT CHANGE UP (ref 3.5–5.3)
POTASSIUM SERPL-SCNC: 5.2 MMOL/L — SIGNIFICANT CHANGE UP (ref 3.5–5.3)
POTASSIUM SERPL-SCNC: 5.3 MMOL/L — SIGNIFICANT CHANGE UP (ref 3.5–5.3)
POTASSIUM SERPL-SCNC: 5.4 MMOL/L — HIGH (ref 3.5–5.3)
POTASSIUM SERPL-SCNC: 5.6 MMOL/L — HIGH (ref 3.5–5.3)
POTASSIUM SERPL-SCNC: 5.7 MMOL/L — HIGH (ref 3.5–5.3)
POTASSIUM SERPL-SCNC: 5.7 MMOL/L — HIGH (ref 3.5–5.3)
POTASSIUM SERPL-SCNC: 5.8 MMOL/L — HIGH (ref 3.5–5.3)
POTASSIUM SERPL-SCNC: 5.9 MMOL/L — HIGH (ref 3.5–5.3)
POTASSIUM UR-SCNC: 21 MMOL/L — SIGNIFICANT CHANGE UP
POTASSIUM UR-SCNC: 37 MMOL/L — SIGNIFICANT CHANGE UP
POTASSIUM UR-SCNC: 38 MMOL/L — SIGNIFICANT CHANGE UP
POTASSIUM UR-SCNC: 43 MMOL/L — SIGNIFICANT CHANGE UP
PROT ?TM UR-MCNC: 14 MG/DL — HIGH (ref 0–12)
PROT ?TM UR-MCNC: 18 MG/DL — HIGH (ref 0–12)
PROT ?TM UR-MCNC: 21 MG/DL — HIGH (ref 0–12)
PROT FLD-MCNC: 3.3 G/DL — SIGNIFICANT CHANGE UP
PROT SERPL-MCNC: 6.1 G/DL — SIGNIFICANT CHANGE UP (ref 6–8.3)
PROT SERPL-MCNC: 6.3 G/DL — SIGNIFICANT CHANGE UP (ref 6–8.3)
PROT SERPL-MCNC: 6.3 G/DL — SIGNIFICANT CHANGE UP (ref 6–8.3)
PROT SERPL-MCNC: 6.4 G/DL — SIGNIFICANT CHANGE UP (ref 6–8.3)
PROT SERPL-MCNC: 6.4 G/DL — SIGNIFICANT CHANGE UP (ref 6–8.3)
PROT SERPL-MCNC: 6.5 G/DL — SIGNIFICANT CHANGE UP (ref 6–8.3)
PROT SERPL-MCNC: 6.6 G/DL — SIGNIFICANT CHANGE UP (ref 6–8.3)
PROT SERPL-MCNC: 6.7 G/DL — SIGNIFICANT CHANGE UP (ref 6–8.3)
PROT SERPL-MCNC: 6.7 G/DL — SIGNIFICANT CHANGE UP (ref 6–8.3)
PROT SERPL-MCNC: 6.8 G/DL — SIGNIFICANT CHANGE UP (ref 6–8.3)
PROT SERPL-MCNC: 6.9 G/DL — SIGNIFICANT CHANGE UP (ref 6–8.3)
PROT SERPL-MCNC: 7 G/DL — SIGNIFICANT CHANGE UP (ref 6–8.3)
PROT SERPL-MCNC: 7.6 G/DL — SIGNIFICANT CHANGE UP (ref 6–8.3)
PROT UR-MCNC: ABNORMAL
PROT UR-MCNC: SIGNIFICANT CHANGE UP
PROT/CREAT UR-RTO: 0.2 RATIO — SIGNIFICANT CHANGE UP (ref 0–0.2)
PROTHROM AB SERPL-ACNC: 16.3 SEC — HIGH (ref 10.5–13.4)
PROTHROM AB SERPL-ACNC: 16.5 SEC — HIGH (ref 10.5–13.4)
PROTHROM AB SERPL-ACNC: 16.7 SEC — HIGH (ref 10.5–13.4)
PROTHROM AB SERPL-ACNC: 17.6 SEC — HIGH (ref 10.5–13.4)
PROTHROM AB SERPL-ACNC: 18.2 SEC — HIGH (ref 10.5–13.4)
PROTHROM AB SERPL-ACNC: 19.3 SEC — HIGH (ref 10.5–13.4)
PROTHROM AB SERPL-ACNC: 19.3 SEC — HIGH (ref 10.5–13.4)
PROTHROM AB SERPL-ACNC: 29.4 SEC — HIGH (ref 10.5–13.4)
PROTHROM AB SERPL-ACNC: 35.3 SEC — HIGH (ref 10.5–13.4)
PROTHROM AB SERPL-ACNC: 38.6 SEC — HIGH (ref 10.5–13.4)
PROTHROM AB SERPL-ACNC: 42.4 SEC — HIGH (ref 10.5–13.4)
PROTHROM AB SERPL-ACNC: 46.2 SEC — HIGH (ref 10.5–13.4)
RBC # BLD: 3 M/UL — LOW (ref 4.2–5.8)
RBC # BLD: 3.18 M/UL — LOW (ref 4.2–5.8)
RBC # BLD: 3.25 M/UL — LOW (ref 4.2–5.8)
RBC # BLD: 3.32 M/UL — LOW (ref 4.2–5.8)
RBC # BLD: 3.53 M/UL — LOW (ref 4.2–5.8)
RBC # BLD: 3.59 M/UL — LOW (ref 4.2–5.8)
RBC # BLD: 3.61 M/UL — LOW (ref 4.2–5.8)
RBC # BLD: 3.64 M/UL — LOW (ref 4.2–5.8)
RBC # BLD: 3.66 M/UL — LOW (ref 4.2–5.8)
RBC # BLD: 3.67 M/UL — LOW (ref 4.2–5.8)
RBC # BLD: 3.7 M/UL — LOW (ref 4.2–5.8)
RBC # BLD: 3.71 M/UL — LOW (ref 4.2–5.8)
RBC # BLD: 3.73 M/UL — LOW (ref 4.2–5.8)
RBC # BLD: 3.76 M/UL — LOW (ref 4.2–5.8)
RBC # BLD: 3.77 M/UL — LOW (ref 4.2–5.8)
RBC # BLD: 3.82 M/UL — LOW (ref 4.2–5.8)
RBC # BLD: 3.91 M/UL — LOW (ref 4.2–5.8)
RBC # BLD: 3.97 M/UL — LOW (ref 4.2–5.8)
RBC # FLD: 15.7 % — HIGH (ref 10.3–14.5)
RBC # FLD: 15.8 % — HIGH (ref 10.3–14.5)
RBC # FLD: 15.8 % — HIGH (ref 10.3–14.5)
RBC # FLD: 15.9 % — HIGH (ref 10.3–14.5)
RBC # FLD: 16.1 % — HIGH (ref 10.3–14.5)
RBC # FLD: 16.2 % — HIGH (ref 10.3–14.5)
RBC # FLD: 16.3 % — HIGH (ref 10.3–14.5)
RBC # FLD: 16.4 % — HIGH (ref 10.3–14.5)
RBC # FLD: 16.8 % — HIGH (ref 10.3–14.5)
RBC # FLD: 17.1 % — HIGH (ref 10.3–14.5)
RBC # FLD: 17.3 % — HIGH (ref 10.3–14.5)
RBC # FLD: 17.8 % — HIGH (ref 10.3–14.5)
RBC # FLD: 18.3 % — HIGH (ref 10.3–14.5)
RBC # FLD: 18.8 % — HIGH (ref 10.3–14.5)
RBC # FLD: 19.1 % — HIGH (ref 10.3–14.5)
RBC # FLD: 19.8 % — HIGH (ref 10.3–14.5)
RBC # FLD: 20.1 % — HIGH (ref 10.3–14.5)
RBC # FLD: 20.5 % — HIGH (ref 10.3–14.5)
RBC CASTS # UR COMP ASSIST: 2 /HPF — SIGNIFICANT CHANGE UP (ref 0–4)
RBC CASTS # UR COMP ASSIST: 5 /HPF — HIGH (ref 0–4)
RBC CASTS # UR COMP ASSIST: 5 /HPF — HIGH (ref 0–4)
RCV VOL RI: 253 /UL — HIGH (ref 0–0)
RH IG SCN BLD-IMP: POSITIVE — SIGNIFICANT CHANGE UP
RSV RNA NPH QL NAA+NON-PROBE: SIGNIFICANT CHANGE UP
S AUREUS DNA NOSE QL NAA+PROBE: SIGNIFICANT CHANGE UP
SARS-COV-2 RNA SPEC QL NAA+PROBE: SIGNIFICANT CHANGE UP
SMOOTH MUSCLE AB SER-ACNC: ABNORMAL
SODIUM SERPL-SCNC: 130 MMOL/L — LOW (ref 135–145)
SODIUM SERPL-SCNC: 131 MMOL/L — LOW (ref 135–145)
SODIUM SERPL-SCNC: 131 MMOL/L — LOW (ref 135–145)
SODIUM SERPL-SCNC: 132 MMOL/L — LOW (ref 135–145)
SODIUM SERPL-SCNC: 133 MMOL/L — LOW (ref 135–145)
SODIUM SERPL-SCNC: 135 MMOL/L — SIGNIFICANT CHANGE UP (ref 135–145)
SODIUM SERPL-SCNC: 135 MMOL/L — SIGNIFICANT CHANGE UP (ref 135–145)
SODIUM SERPL-SCNC: 136 MMOL/L — SIGNIFICANT CHANGE UP (ref 135–145)
SODIUM SERPL-SCNC: 136 MMOL/L — SIGNIFICANT CHANGE UP (ref 135–145)
SODIUM SERPL-SCNC: 137 MMOL/L — SIGNIFICANT CHANGE UP (ref 135–145)
SODIUM SERPL-SCNC: 138 MMOL/L — SIGNIFICANT CHANGE UP (ref 135–145)
SODIUM SERPL-SCNC: 139 MMOL/L — SIGNIFICANT CHANGE UP (ref 135–145)
SODIUM UR-SCNC: 11 MMOL/L — SIGNIFICANT CHANGE UP
SODIUM UR-SCNC: 17 MMOL/L — SIGNIFICANT CHANGE UP
SODIUM UR-SCNC: 72 MMOL/L — SIGNIFICANT CHANGE UP
SODIUM UR-SCNC: 9 MMOL/L — SIGNIFICANT CHANGE UP
SP GR SPEC: 1.01 — SIGNIFICANT CHANGE UP (ref 1.01–1.02)
SP GR SPEC: 1.02 — SIGNIFICANT CHANGE UP (ref 1.01–1.02)
SPECIMEN SOURCE: SIGNIFICANT CHANGE UP
TOTAL NUCLEATED CELL COUNT, BODY FLUID: 204 /UL — SIGNIFICANT CHANGE UP
TROPONIN T, HIGH SENSITIVITY RESULT: 41 NG/L — SIGNIFICANT CHANGE UP (ref 0–51)
TROPONIN T, HIGH SENSITIVITY RESULT: 57 NG/L — HIGH (ref 0–51)
TUBE TYPE: SIGNIFICANT CHANGE UP
UROBILINOGEN FLD QL: ABNORMAL
UUN UR-MCNC: 698 MG/DL — SIGNIFICANT CHANGE UP
UUN UR-MCNC: 748 MG/DL — SIGNIFICANT CHANGE UP
UUN UR-MCNC: 853 MG/DL — SIGNIFICANT CHANGE UP
UUN UR-MCNC: 951 MG/DL — SIGNIFICANT CHANGE UP
WBC # BLD: 12.26 K/UL — HIGH (ref 3.8–10.5)
WBC # BLD: 13.24 K/UL — HIGH (ref 3.8–10.5)
WBC # BLD: 13.34 K/UL — HIGH (ref 3.8–10.5)
WBC # BLD: 14.1 K/UL — HIGH (ref 3.8–10.5)
WBC # BLD: 14.89 K/UL — HIGH (ref 3.8–10.5)
WBC # BLD: 15.78 K/UL — HIGH (ref 3.8–10.5)
WBC # BLD: 15.89 K/UL — HIGH (ref 3.8–10.5)
WBC # BLD: 15.91 K/UL — HIGH (ref 3.8–10.5)
WBC # BLD: 15.94 K/UL — HIGH (ref 3.8–10.5)
WBC # BLD: 16.02 K/UL — HIGH (ref 3.8–10.5)
WBC # BLD: 16.18 K/UL — HIGH (ref 3.8–10.5)
WBC # BLD: 16.29 K/UL — HIGH (ref 3.8–10.5)
WBC # BLD: 16.32 K/UL — HIGH (ref 3.8–10.5)
WBC # BLD: 16.71 K/UL — HIGH (ref 3.8–10.5)
WBC # BLD: 16.89 K/UL — HIGH (ref 3.8–10.5)
WBC # BLD: 16.91 K/UL — HIGH (ref 3.8–10.5)
WBC # BLD: 17.16 K/UL — HIGH (ref 3.8–10.5)
WBC # BLD: 17.16 K/UL — HIGH (ref 3.8–10.5)
WBC # FLD AUTO: 12.26 K/UL — HIGH (ref 3.8–10.5)
WBC # FLD AUTO: 13.24 K/UL — HIGH (ref 3.8–10.5)
WBC # FLD AUTO: 13.34 K/UL — HIGH (ref 3.8–10.5)
WBC # FLD AUTO: 14.1 K/UL — HIGH (ref 3.8–10.5)
WBC # FLD AUTO: 14.89 K/UL — HIGH (ref 3.8–10.5)
WBC # FLD AUTO: 15.78 K/UL — HIGH (ref 3.8–10.5)
WBC # FLD AUTO: 15.89 K/UL — HIGH (ref 3.8–10.5)
WBC # FLD AUTO: 15.91 K/UL — HIGH (ref 3.8–10.5)
WBC # FLD AUTO: 15.94 K/UL — HIGH (ref 3.8–10.5)
WBC # FLD AUTO: 16.02 K/UL — HIGH (ref 3.8–10.5)
WBC # FLD AUTO: 16.18 K/UL — HIGH (ref 3.8–10.5)
WBC # FLD AUTO: 16.29 K/UL — HIGH (ref 3.8–10.5)
WBC # FLD AUTO: 16.32 K/UL — HIGH (ref 3.8–10.5)
WBC # FLD AUTO: 16.71 K/UL — HIGH (ref 3.8–10.5)
WBC # FLD AUTO: 16.89 K/UL — HIGH (ref 3.8–10.5)
WBC # FLD AUTO: 16.91 K/UL — HIGH (ref 3.8–10.5)
WBC # FLD AUTO: 17.16 K/UL — HIGH (ref 3.8–10.5)
WBC # FLD AUTO: 17.16 K/UL — HIGH (ref 3.8–10.5)
WBC UR QL: 0 /HPF — SIGNIFICANT CHANGE UP (ref 0–5)
WBC UR QL: 0 /HPF — SIGNIFICANT CHANGE UP (ref 0–5)
WBC UR QL: 1 /HPF — SIGNIFICANT CHANGE UP (ref 0–5)
WBC UR QL: 1 /HPF — SIGNIFICANT CHANGE UP (ref 0–5)
WBC UR QL: 3 /HPF — SIGNIFICANT CHANGE UP (ref 0–5)

## 2022-01-01 PROCEDURE — 83735 ASSAY OF MAGNESIUM: CPT

## 2022-01-01 PROCEDURE — 88307 TISSUE EXAM BY PATHOLOGIST: CPT | Mod: 26

## 2022-01-01 PROCEDURE — 74182 MRI ABDOMEN W/CONTRAST: CPT

## 2022-01-01 PROCEDURE — 99232 SBSQ HOSP IP/OBS MODERATE 35: CPT

## 2022-01-01 PROCEDURE — 86663 EPSTEIN-BARR ANTIBODY: CPT

## 2022-01-01 PROCEDURE — 49083 ABD PARACENTESIS W/IMAGING: CPT

## 2022-01-01 PROCEDURE — 76942 ECHO GUIDE FOR BIOPSY: CPT | Mod: 26,XU

## 2022-01-01 PROCEDURE — 84540 ASSAY OF URINE/UREA-N: CPT

## 2022-01-01 PROCEDURE — 83880 ASSAY OF NATRIURETIC PEPTIDE: CPT

## 2022-01-01 PROCEDURE — 85025 COMPLETE CBC W/AUTO DIFF WBC: CPT

## 2022-01-01 PROCEDURE — 88313 SPECIAL STAINS GROUP 2: CPT | Mod: 26

## 2022-01-01 PROCEDURE — C1729: CPT

## 2022-01-01 PROCEDURE — 80048 BASIC METABOLIC PNL TOTAL CA: CPT

## 2022-01-01 PROCEDURE — 76942 ECHO GUIDE FOR BIOPSY: CPT | Mod: XU

## 2022-01-01 PROCEDURE — 99285 EMERGENCY DEPT VISIT HI MDM: CPT | Mod: GC

## 2022-01-01 PROCEDURE — 82962 GLUCOSE BLOOD TEST: CPT

## 2022-01-01 PROCEDURE — 99231 SBSQ HOSP IP/OBS SF/LOW 25: CPT | Mod: FS

## 2022-01-01 PROCEDURE — 87637 SARSCOV2&INF A&B&RSV AMP PRB: CPT

## 2022-01-01 PROCEDURE — 94640 AIRWAY INHALATION TREATMENT: CPT

## 2022-01-01 PROCEDURE — 86038 ANTINUCLEAR ANTIBODIES: CPT

## 2022-01-01 PROCEDURE — 96374 THER/PROPH/DIAG INJ IV PUSH: CPT

## 2022-01-01 PROCEDURE — 76770 US EXAM ABDO BACK WALL COMP: CPT

## 2022-01-01 PROCEDURE — 88112 CYTOPATH CELL ENHANCE TECH: CPT | Mod: 26

## 2022-01-01 PROCEDURE — 83615 LACTATE (LD) (LDH) ENZYME: CPT

## 2022-01-01 PROCEDURE — 89051 BODY FLUID CELL COUNT: CPT

## 2022-01-01 PROCEDURE — 87040 BLOOD CULTURE FOR BACTERIA: CPT

## 2022-01-01 PROCEDURE — 84157 ASSAY OF PROTEIN OTHER: CPT

## 2022-01-01 PROCEDURE — 99232 SBSQ HOSP IP/OBS MODERATE 35: CPT | Mod: GC

## 2022-01-01 PROCEDURE — 88341 IMHCHEM/IMCYTCHM EA ADD ANTB: CPT | Mod: 26

## 2022-01-01 PROCEDURE — 76705 ECHO EXAM OF ABDOMEN: CPT | Mod: 26

## 2022-01-01 PROCEDURE — 76770 US EXAM ABDO BACK WALL COMP: CPT | Mod: 26

## 2022-01-01 PROCEDURE — 47000 NEEDLE BIOPSY OF LIVER PERQ: CPT

## 2022-01-01 PROCEDURE — 97161 PT EVAL LOW COMPLEX 20 MIN: CPT

## 2022-01-01 PROCEDURE — 86664 EPSTEIN-BARR NUCLEAR ANTIGEN: CPT

## 2022-01-01 PROCEDURE — 88305 TISSUE EXAM BY PATHOLOGIST: CPT | Mod: 26

## 2022-01-01 PROCEDURE — 74181 MRI ABDOMEN W/O CONTRAST: CPT | Mod: 26

## 2022-01-01 PROCEDURE — 99223 1ST HOSP IP/OBS HIGH 75: CPT

## 2022-01-01 PROCEDURE — 86901 BLOOD TYPING SEROLOGIC RH(D): CPT

## 2022-01-01 PROCEDURE — 86255 FLUORESCENT ANTIBODY SCREEN: CPT

## 2022-01-01 PROCEDURE — 88342 IMHCHEM/IMCYTCHM 1ST ANTB: CPT | Mod: 26,59

## 2022-01-01 PROCEDURE — 87075 CULTR BACTERIA EXCEPT BLOOD: CPT

## 2022-01-01 PROCEDURE — 86301 IMMUNOASSAY TUMOR CA 19-9: CPT

## 2022-01-01 PROCEDURE — 85027 COMPLETE CBC AUTOMATED: CPT

## 2022-01-01 PROCEDURE — 87641 MR-STAPH DNA AMP PROBE: CPT

## 2022-01-01 PROCEDURE — P9047: CPT

## 2022-01-01 PROCEDURE — 85610 PROTHROMBIN TIME: CPT

## 2022-01-01 PROCEDURE — 99238 HOSP IP/OBS DSCHRG MGMT 30/<: CPT

## 2022-01-01 PROCEDURE — 84300 ASSAY OF URINE SODIUM: CPT

## 2022-01-01 PROCEDURE — 99285 EMERGENCY DEPT VISIT HI MDM: CPT

## 2022-01-01 PROCEDURE — 74181 MRI ABDOMEN W/O CONTRAST: CPT

## 2022-01-01 PROCEDURE — 86381 MITOCHONDRIAL ANTIBODY EACH: CPT

## 2022-01-01 PROCEDURE — 93005 ELECTROCARDIOGRAM TRACING: CPT

## 2022-01-01 PROCEDURE — 82105 ALPHA-FETOPROTEIN SERUM: CPT

## 2022-01-01 PROCEDURE — 99497 ADVNCD CARE PLAN 30 MIN: CPT | Mod: 25

## 2022-01-01 PROCEDURE — C8929: CPT

## 2022-01-01 PROCEDURE — 82570 ASSAY OF URINE CREATININE: CPT

## 2022-01-01 PROCEDURE — 82042 OTHER SOURCE ALBUMIN QUAN EA: CPT

## 2022-01-01 PROCEDURE — 87102 FUNGUS ISOLATION CULTURE: CPT

## 2022-01-01 PROCEDURE — 84484 ASSAY OF TROPONIN QUANT: CPT

## 2022-01-01 PROCEDURE — 88341 IMHCHEM/IMCYTCHM EA ADD ANTB: CPT

## 2022-01-01 PROCEDURE — 87529 HSV DNA AMP PROBE: CPT

## 2022-01-01 PROCEDURE — 97530 THERAPEUTIC ACTIVITIES: CPT

## 2022-01-01 PROCEDURE — 86665 EPSTEIN-BARR CAPSID VCA: CPT

## 2022-01-01 PROCEDURE — 88173 CYTOPATH EVAL FNA REPORT: CPT

## 2022-01-01 PROCEDURE — 82436 ASSAY OF URINE CHLORIDE: CPT

## 2022-01-01 PROCEDURE — 88342 IMHCHEM/IMCYTCHM 1ST ANTB: CPT

## 2022-01-01 PROCEDURE — 97110 THERAPEUTIC EXERCISES: CPT

## 2022-01-01 PROCEDURE — U0003: CPT

## 2022-01-01 PROCEDURE — A9585: CPT

## 2022-01-01 PROCEDURE — 80053 COMPREHEN METABOLIC PANEL: CPT

## 2022-01-01 PROCEDURE — 85730 THROMBOPLASTIN TIME PARTIAL: CPT

## 2022-01-01 PROCEDURE — U0005: CPT

## 2022-01-01 PROCEDURE — 76705 ECHO EXAM OF ABDOMEN: CPT

## 2022-01-01 PROCEDURE — 36415 COLL VENOUS BLD VENIPUNCTURE: CPT

## 2022-01-01 PROCEDURE — 86900 BLOOD TYPING SEROLOGIC ABO: CPT

## 2022-01-01 PROCEDURE — 87205 SMEAR GRAM STAIN: CPT

## 2022-01-01 PROCEDURE — 71045 X-RAY EXAM CHEST 1 VIEW: CPT | Mod: 26

## 2022-01-01 PROCEDURE — 88305 TISSUE EXAM BY PATHOLOGIST: CPT | Mod: 26,59

## 2022-01-01 PROCEDURE — 49082 ABD PARACENTESIS: CPT | Mod: RT

## 2022-01-01 PROCEDURE — 99233 SBSQ HOSP IP/OBS HIGH 50: CPT | Mod: GC

## 2022-01-01 PROCEDURE — 99223 1ST HOSP IP/OBS HIGH 75: CPT | Mod: GC

## 2022-01-01 PROCEDURE — 84100 ASSAY OF PHOSPHORUS: CPT

## 2022-01-01 PROCEDURE — 71045 X-RAY EXAM CHEST 1 VIEW: CPT

## 2022-01-01 PROCEDURE — 80074 ACUTE HEPATITIS PANEL: CPT

## 2022-01-01 PROCEDURE — 74182 MRI ABDOMEN W/CONTRAST: CPT | Mod: 26

## 2022-01-01 PROCEDURE — 93306 TTE W/DOPPLER COMPLETE: CPT | Mod: 26

## 2022-01-01 PROCEDURE — 97116 GAIT TRAINING THERAPY: CPT

## 2022-01-01 PROCEDURE — 88172 CYTP DX EVAL FNA 1ST EA SITE: CPT

## 2022-01-01 PROCEDURE — 88341 IMHCHEM/IMCYTCHM EA ADD ANTB: CPT | Mod: 26,59

## 2022-01-01 PROCEDURE — 88173 CYTOPATH EVAL FNA REPORT: CPT | Mod: 26

## 2022-01-01 PROCEDURE — 82945 GLUCOSE OTHER FLUID: CPT

## 2022-01-01 PROCEDURE — 88342 IMHCHEM/IMCYTCHM 1ST ANTB: CPT | Mod: 26

## 2022-01-01 PROCEDURE — 84133 ASSAY OF URINE POTASSIUM: CPT

## 2022-01-01 PROCEDURE — 87640 STAPH A DNA AMP PROBE: CPT

## 2022-01-01 PROCEDURE — 88112 CYTOPATH CELL ENHANCE TECH: CPT | Mod: 26,59

## 2022-01-01 PROCEDURE — 87070 CULTURE OTHR SPECIMN AEROBIC: CPT

## 2022-01-01 PROCEDURE — 88307 TISSUE EXAM BY PATHOLOGIST: CPT

## 2022-01-01 PROCEDURE — 83935 ASSAY OF URINE OSMOLALITY: CPT

## 2022-01-01 PROCEDURE — 88305 TISSUE EXAM BY PATHOLOGIST: CPT

## 2022-01-01 PROCEDURE — 88112 CYTOPATH CELL ENHANCE TECH: CPT

## 2022-01-01 PROCEDURE — 84156 ASSAY OF PROTEIN URINE: CPT

## 2022-01-01 PROCEDURE — 88313 SPECIAL STAINS GROUP 2: CPT

## 2022-01-01 PROCEDURE — 82248 BILIRUBIN DIRECT: CPT

## 2022-01-01 PROCEDURE — 82378 CARCINOEMBRYONIC ANTIGEN: CPT

## 2022-01-01 PROCEDURE — 86850 RBC ANTIBODY SCREEN: CPT

## 2022-01-01 PROCEDURE — 81001 URINALYSIS AUTO W/SCOPE: CPT

## 2022-01-01 RX ORDER — PANTOPRAZOLE SODIUM 20 MG/1
1 TABLET, DELAYED RELEASE ORAL
Qty: 0 | Refills: 0 | DISCHARGE

## 2022-01-01 RX ORDER — OXYCODONE HYDROCHLORIDE 5 MG/1
5 TABLET ORAL ONCE
Refills: 0 | Status: DISCONTINUED | OUTPATIENT
Start: 2022-01-01 | End: 2022-01-01

## 2022-01-01 RX ORDER — SODIUM ZIRCONIUM CYCLOSILICATE 10 G/10G
10 POWDER, FOR SUSPENSION ORAL ONCE
Refills: 0 | Status: COMPLETED | OUTPATIENT
Start: 2022-01-01 | End: 2022-01-01

## 2022-01-01 RX ORDER — SODIUM ZIRCONIUM CYCLOSILICATE 10 G/10G
10 POWDER, FOR SUSPENSION ORAL
Refills: 0 | Status: COMPLETED | OUTPATIENT
Start: 2022-01-01 | End: 2022-01-01

## 2022-01-01 RX ORDER — SODIUM CHLORIDE 9 MG/ML
1000 INJECTION INTRAMUSCULAR; INTRAVENOUS; SUBCUTANEOUS
Refills: 0 | Status: DISCONTINUED | OUTPATIENT
Start: 2022-01-01 | End: 2022-01-01

## 2022-01-01 RX ORDER — CHLORHEXIDINE GLUCONATE 213 G/1000ML
1 SOLUTION TOPICAL
Refills: 0 | Status: DISCONTINUED | OUTPATIENT
Start: 2022-01-01 | End: 2022-01-01

## 2022-01-01 RX ORDER — APIXABAN 2.5 MG/1
1 TABLET, FILM COATED ORAL
Qty: 0 | Refills: 0 | DISCHARGE

## 2022-01-01 RX ORDER — ONDANSETRON 8 MG/1
4 TABLET, FILM COATED ORAL EVERY 8 HOURS
Refills: 0 | Status: DISCONTINUED | OUTPATIENT
Start: 2022-01-01 | End: 2022-01-01

## 2022-01-01 RX ORDER — ACETAMINOPHEN 500 MG
650 TABLET ORAL EVERY 8 HOURS
Refills: 0 | Status: DISCONTINUED | OUTPATIENT
Start: 2022-01-01 | End: 2022-01-01

## 2022-01-01 RX ORDER — FINASTERIDE 5 MG/1
5 TABLET, FILM COATED ORAL DAILY
Refills: 0 | Status: DISCONTINUED | OUTPATIENT
Start: 2022-01-01 | End: 2022-01-01

## 2022-01-01 RX ORDER — OCTREOTIDE ACETATE 200 UG/ML
50 INJECTION, SOLUTION INTRAVENOUS; SUBCUTANEOUS
Qty: 500 | Refills: 0 | Status: DISCONTINUED | OUTPATIENT
Start: 2022-01-01 | End: 2022-01-01

## 2022-01-01 RX ORDER — PHYTONADIONE (VIT K1) 5 MG
5 TABLET ORAL DAILY
Refills: 0 | Status: COMPLETED | OUTPATIENT
Start: 2022-01-01 | End: 2022-01-01

## 2022-01-01 RX ORDER — HYDROMORPHONE HYDROCHLORIDE 2 MG/ML
0.2 INJECTION INTRAMUSCULAR; INTRAVENOUS; SUBCUTANEOUS
Refills: 0 | Status: DISCONTINUED | OUTPATIENT
Start: 2022-01-01 | End: 2022-01-01

## 2022-01-01 RX ORDER — INSULIN HUMAN 100 [IU]/ML
5 INJECTION, SOLUTION SUBCUTANEOUS ONCE
Refills: 0 | Status: COMPLETED | OUTPATIENT
Start: 2022-01-01 | End: 2022-01-01

## 2022-01-01 RX ORDER — SIMVASTATIN 20 MG/1
1 TABLET, FILM COATED ORAL
Qty: 0 | Refills: 0 | DISCHARGE

## 2022-01-01 RX ORDER — ROBINUL 0.2 MG/ML
0.4 INJECTION INTRAMUSCULAR; INTRAVENOUS EVERY 6 HOURS
Refills: 0 | Status: DISCONTINUED | OUTPATIENT
Start: 2022-01-01 | End: 2022-01-01

## 2022-01-01 RX ORDER — ALBUTEROL 90 UG/1
2.5 AEROSOL, METERED ORAL ONCE
Refills: 0 | Status: COMPLETED | OUTPATIENT
Start: 2022-01-01 | End: 2022-01-01

## 2022-01-01 RX ORDER — FUROSEMIDE 40 MG
60 TABLET ORAL THREE TIMES A DAY
Refills: 0 | Status: DISCONTINUED | OUTPATIENT
Start: 2022-01-01 | End: 2022-01-01

## 2022-01-01 RX ORDER — ALBUTEROL 90 UG/1
10 AEROSOL, METERED ORAL ONCE
Refills: 0 | Status: COMPLETED | OUTPATIENT
Start: 2022-01-01 | End: 2022-01-01

## 2022-01-01 RX ORDER — AMLODIPINE BESYLATE 2.5 MG/1
10 TABLET ORAL DAILY
Refills: 0 | Status: DISCONTINUED | OUTPATIENT
Start: 2022-01-01 | End: 2022-01-01

## 2022-01-01 RX ORDER — ALBUMIN HUMAN 25 %
100 VIAL (ML) INTRAVENOUS EVERY 6 HOURS
Refills: 0 | Status: COMPLETED | OUTPATIENT
Start: 2022-01-01 | End: 2022-01-01

## 2022-01-01 RX ORDER — APIXABAN 2.5 MG/1
2.5 TABLET, FILM COATED ORAL
Refills: 0 | Status: DISCONTINUED | OUTPATIENT
Start: 2022-01-01 | End: 2022-01-01

## 2022-01-01 RX ORDER — LIDOCAINE 4 G/100G
1 CREAM TOPICAL ONCE
Refills: 0 | Status: COMPLETED | OUTPATIENT
Start: 2022-01-01 | End: 2022-01-01

## 2022-01-01 RX ORDER — SODIUM ZIRCONIUM CYCLOSILICATE 10 G/10G
10 POWDER, FOR SUSPENSION ORAL EVERY 8 HOURS
Refills: 0 | Status: COMPLETED | OUTPATIENT
Start: 2022-01-01 | End: 2022-01-01

## 2022-01-01 RX ORDER — LANOLIN ALCOHOL/MO/W.PET/CERES
3 CREAM (GRAM) TOPICAL AT BEDTIME
Refills: 0 | Status: DISCONTINUED | OUTPATIENT
Start: 2022-01-01 | End: 2022-01-01

## 2022-01-01 RX ORDER — SODIUM ZIRCONIUM CYCLOSILICATE 10 G/10G
5 POWDER, FOR SUSPENSION ORAL ONCE
Refills: 0 | Status: COMPLETED | OUTPATIENT
Start: 2022-01-01 | End: 2022-01-01

## 2022-01-01 RX ORDER — DEXTROSE 50 % IN WATER 50 %
50 SYRINGE (ML) INTRAVENOUS ONCE
Refills: 0 | Status: COMPLETED | OUTPATIENT
Start: 2022-01-01 | End: 2022-01-01

## 2022-01-01 RX ORDER — SODIUM CHLORIDE 9 MG/ML
500 INJECTION INTRAMUSCULAR; INTRAVENOUS; SUBCUTANEOUS ONCE
Refills: 0 | Status: COMPLETED | OUTPATIENT
Start: 2022-01-01 | End: 2022-01-01

## 2022-01-01 RX ORDER — SODIUM ZIRCONIUM CYCLOSILICATE 10 G/10G
5 POWDER, FOR SUSPENSION ORAL ONCE
Refills: 0 | Status: DISCONTINUED | OUTPATIENT
Start: 2022-01-01 | End: 2022-01-01

## 2022-01-01 RX ORDER — SENNA PLUS 8.6 MG/1
2 TABLET ORAL AT BEDTIME
Refills: 0 | Status: DISCONTINUED | OUTPATIENT
Start: 2022-01-01 | End: 2022-01-01

## 2022-01-01 RX ORDER — PANTOPRAZOLE SODIUM 20 MG/1
40 TABLET, DELAYED RELEASE ORAL
Refills: 0 | Status: DISCONTINUED | OUTPATIENT
Start: 2022-01-01 | End: 2022-01-01

## 2022-01-01 RX ORDER — ALBUMIN HUMAN 25 %
100 VIAL (ML) INTRAVENOUS EVERY 8 HOURS
Refills: 0 | Status: DISCONTINUED | OUTPATIENT
Start: 2022-01-01 | End: 2022-01-01

## 2022-01-01 RX ORDER — AMLODIPINE BESYLATE 2.5 MG/1
1 TABLET ORAL
Qty: 0 | Refills: 0 | DISCHARGE

## 2022-01-01 RX ORDER — FUROSEMIDE 40 MG
20 TABLET ORAL ONCE
Refills: 0 | Status: COMPLETED | OUTPATIENT
Start: 2022-01-01 | End: 2022-01-01

## 2022-01-01 RX ORDER — LIDOCAINE 4 G/100G
1 CREAM TOPICAL EVERY 24 HOURS
Refills: 0 | Status: DISCONTINUED | OUTPATIENT
Start: 2022-01-01 | End: 2022-01-01

## 2022-01-01 RX ORDER — DUTASTERIDE 0.5 MG/1
1 CAPSULE, LIQUID FILLED ORAL
Qty: 0 | Refills: 0 | DISCHARGE

## 2022-01-01 RX ORDER — VALSARTAN 80 MG/1
1 TABLET ORAL
Qty: 0 | Refills: 0 | DISCHARGE

## 2022-01-01 RX ORDER — ALBUMIN HUMAN 25 %
100 VIAL (ML) INTRAVENOUS EVERY 6 HOURS
Refills: 0 | Status: DISCONTINUED | OUTPATIENT
Start: 2022-01-01 | End: 2022-01-01

## 2022-01-01 RX ORDER — MIDODRINE HYDROCHLORIDE 2.5 MG/1
10 TABLET ORAL THREE TIMES A DAY
Refills: 0 | Status: DISCONTINUED | OUTPATIENT
Start: 2022-01-01 | End: 2022-01-01

## 2022-01-01 RX ORDER — ACETAMINOPHEN 500 MG
650 TABLET ORAL EVERY 6 HOURS
Refills: 0 | Status: DISCONTINUED | OUTPATIENT
Start: 2022-01-01 | End: 2022-01-01

## 2022-01-01 RX ORDER — INSULIN HUMAN 100 [IU]/ML
10 INJECTION, SOLUTION SUBCUTANEOUS ONCE
Refills: 0 | Status: DISCONTINUED | OUTPATIENT
Start: 2022-01-01 | End: 2022-01-01

## 2022-01-01 RX ORDER — SODIUM BICARBONATE 1 MEQ/ML
650 SYRINGE (ML) INTRAVENOUS THREE TIMES A DAY
Refills: 0 | Status: DISCONTINUED | OUTPATIENT
Start: 2022-01-01 | End: 2022-01-01

## 2022-01-01 RX ORDER — HYDROMORPHONE HYDROCHLORIDE 2 MG/ML
0.5 INJECTION INTRAMUSCULAR; INTRAVENOUS; SUBCUTANEOUS
Refills: 0 | Status: DISCONTINUED | OUTPATIENT
Start: 2022-01-01 | End: 2022-01-01

## 2022-01-01 RX ORDER — IPRATROPIUM/ALBUTEROL SULFATE 18-103MCG
3 AEROSOL WITH ADAPTER (GRAM) INHALATION ONCE
Refills: 0 | Status: DISCONTINUED | OUTPATIENT
Start: 2022-01-01 | End: 2022-01-01

## 2022-01-01 RX ORDER — METOPROLOL TARTRATE 50 MG
1 TABLET ORAL
Qty: 0 | Refills: 0 | DISCHARGE

## 2022-01-01 RX ADMIN — FINASTERIDE 5 MILLIGRAM(S): 5 TABLET, FILM COATED ORAL at 08:16

## 2022-01-01 RX ADMIN — Medication 650 MILLIGRAM(S): at 13:02

## 2022-01-01 RX ADMIN — Medication 1 APPLICATION(S): at 05:22

## 2022-01-01 RX ADMIN — Medication 101 MILLIGRAM(S): at 19:53

## 2022-01-01 RX ADMIN — SODIUM CHLORIDE 75 MILLILITER(S): 9 INJECTION INTRAMUSCULAR; INTRAVENOUS; SUBCUTANEOUS at 11:10

## 2022-01-01 RX ADMIN — APIXABAN 2.5 MILLIGRAM(S): 2.5 TABLET, FILM COATED ORAL at 17:15

## 2022-01-01 RX ADMIN — Medication 650 MILLIGRAM(S): at 13:33

## 2022-01-01 RX ADMIN — Medication 650 MILLIGRAM(S): at 22:11

## 2022-01-01 RX ADMIN — Medication 650 MILLIGRAM(S): at 15:03

## 2022-01-01 RX ADMIN — Medication 650 MILLIGRAM(S): at 06:57

## 2022-01-01 RX ADMIN — SODIUM CHLORIDE 125 MILLILITER(S): 9 INJECTION INTRAMUSCULAR; INTRAVENOUS; SUBCUTANEOUS at 06:37

## 2022-01-01 RX ADMIN — Medication 650 MILLIGRAM(S): at 05:56

## 2022-01-01 RX ADMIN — Medication 1 APPLICATION(S): at 11:49

## 2022-01-01 RX ADMIN — Medication 1 APPLICATION(S): at 05:07

## 2022-01-01 RX ADMIN — Medication 50 MILLILITER(S): at 06:14

## 2022-01-01 RX ADMIN — APIXABAN 2.5 MILLIGRAM(S): 2.5 TABLET, FILM COATED ORAL at 05:29

## 2022-01-01 RX ADMIN — FINASTERIDE 5 MILLIGRAM(S): 5 TABLET, FILM COATED ORAL at 12:12

## 2022-01-01 RX ADMIN — PANTOPRAZOLE SODIUM 40 MILLIGRAM(S): 20 TABLET, DELAYED RELEASE ORAL at 06:06

## 2022-01-01 RX ADMIN — CHLORHEXIDINE GLUCONATE 1 APPLICATION(S): 213 SOLUTION TOPICAL at 05:29

## 2022-01-01 RX ADMIN — PANTOPRAZOLE SODIUM 40 MILLIGRAM(S): 20 TABLET, DELAYED RELEASE ORAL at 05:18

## 2022-01-01 RX ADMIN — FINASTERIDE 5 MILLIGRAM(S): 5 TABLET, FILM COATED ORAL at 17:08

## 2022-01-01 RX ADMIN — Medication 1 APPLICATION(S): at 17:58

## 2022-01-01 RX ADMIN — FINASTERIDE 5 MILLIGRAM(S): 5 TABLET, FILM COATED ORAL at 14:25

## 2022-01-01 RX ADMIN — ROBINUL 0.4 MILLIGRAM(S): 0.2 INJECTION INTRAMUSCULAR; INTRAVENOUS at 08:51

## 2022-01-01 RX ADMIN — FINASTERIDE 5 MILLIGRAM(S): 5 TABLET, FILM COATED ORAL at 11:14

## 2022-01-01 RX ADMIN — Medication 650 MILLIGRAM(S): at 06:11

## 2022-01-01 RX ADMIN — Medication 650 MILLIGRAM(S): at 05:33

## 2022-01-01 RX ADMIN — CHLORHEXIDINE GLUCONATE 1 APPLICATION(S): 213 SOLUTION TOPICAL at 05:25

## 2022-01-01 RX ADMIN — OXYCODONE HYDROCHLORIDE 5 MILLIGRAM(S): 5 TABLET ORAL at 11:35

## 2022-01-01 RX ADMIN — HYDROMORPHONE HYDROCHLORIDE 0.2 MILLIGRAM(S): 2 INJECTION INTRAMUSCULAR; INTRAVENOUS; SUBCUTANEOUS at 19:29

## 2022-01-01 RX ADMIN — Medication 650 MILLIGRAM(S): at 05:11

## 2022-01-01 RX ADMIN — Medication 650 MILLIGRAM(S): at 21:54

## 2022-01-01 RX ADMIN — LIDOCAINE 1 PATCH: 4 CREAM TOPICAL at 11:14

## 2022-01-01 RX ADMIN — Medication 1 APPLICATION(S): at 18:10

## 2022-01-01 RX ADMIN — Medication 50 MILLILITER(S): at 17:03

## 2022-01-01 RX ADMIN — PANTOPRAZOLE SODIUM 40 MILLIGRAM(S): 20 TABLET, DELAYED RELEASE ORAL at 05:56

## 2022-01-01 RX ADMIN — MIDODRINE HYDROCHLORIDE 10 MILLIGRAM(S): 2.5 TABLET ORAL at 11:34

## 2022-01-01 RX ADMIN — Medication 50 MILLILITER(S): at 23:06

## 2022-01-01 RX ADMIN — PANTOPRAZOLE SODIUM 40 MILLIGRAM(S): 20 TABLET, DELAYED RELEASE ORAL at 05:26

## 2022-01-01 RX ADMIN — Medication 50 MILLILITER(S): at 15:07

## 2022-01-01 RX ADMIN — Medication 50 MILLILITER(S): at 18:21

## 2022-01-01 RX ADMIN — HYDROMORPHONE HYDROCHLORIDE 0.2 MILLIGRAM(S): 2 INJECTION INTRAMUSCULAR; INTRAVENOUS; SUBCUTANEOUS at 13:31

## 2022-01-01 RX ADMIN — Medication 50 MILLILITER(S): at 00:36

## 2022-01-01 RX ADMIN — FINASTERIDE 5 MILLIGRAM(S): 5 TABLET, FILM COATED ORAL at 11:34

## 2022-01-01 RX ADMIN — Medication 50 MILLILITER(S): at 00:12

## 2022-01-01 RX ADMIN — LIDOCAINE 1 PATCH: 4 CREAM TOPICAL at 07:00

## 2022-01-01 RX ADMIN — Medication 0.2 MILLIGRAM(S): at 19:28

## 2022-01-01 RX ADMIN — FINASTERIDE 5 MILLIGRAM(S): 5 TABLET, FILM COATED ORAL at 11:49

## 2022-01-01 RX ADMIN — Medication 650 MILLIGRAM(S): at 22:28

## 2022-01-01 RX ADMIN — Medication 50 MILLILITER(S): at 04:19

## 2022-01-01 RX ADMIN — PANTOPRAZOLE SODIUM 40 MILLIGRAM(S): 20 TABLET, DELAYED RELEASE ORAL at 05:20

## 2022-01-01 RX ADMIN — PANTOPRAZOLE SODIUM 40 MILLIGRAM(S): 20 TABLET, DELAYED RELEASE ORAL at 05:30

## 2022-01-01 RX ADMIN — Medication 50 MILLILITER(S): at 18:18

## 2022-01-01 RX ADMIN — AMLODIPINE BESYLATE 10 MILLIGRAM(S): 2.5 TABLET ORAL at 05:30

## 2022-01-01 RX ADMIN — Medication 650 MILLIGRAM(S): at 05:49

## 2022-01-01 RX ADMIN — LIDOCAINE 1 PATCH: 4 CREAM TOPICAL at 15:16

## 2022-01-01 RX ADMIN — Medication 50 MILLILITER(S): at 04:13

## 2022-01-01 RX ADMIN — PANTOPRAZOLE SODIUM 40 MILLIGRAM(S): 20 TABLET, DELAYED RELEASE ORAL at 06:01

## 2022-01-01 RX ADMIN — OCTREOTIDE ACETATE 10 MICROGRAM(S)/HR: 200 INJECTION, SOLUTION INTRAVENOUS; SUBCUTANEOUS at 11:40

## 2022-01-01 RX ADMIN — AMLODIPINE BESYLATE 10 MILLIGRAM(S): 2.5 TABLET ORAL at 05:45

## 2022-01-01 RX ADMIN — Medication 50 MILLILITER(S): at 10:23

## 2022-01-01 RX ADMIN — Medication 650 MILLIGRAM(S): at 23:22

## 2022-01-01 RX ADMIN — Medication 650 MILLIGRAM(S): at 22:01

## 2022-01-01 RX ADMIN — Medication 1 APPLICATION(S): at 05:49

## 2022-01-01 RX ADMIN — Medication 50 MILLILITER(S): at 11:44

## 2022-01-01 RX ADMIN — Medication 101 MILLIGRAM(S): at 21:46

## 2022-01-01 RX ADMIN — Medication 650 MILLIGRAM(S): at 14:22

## 2022-01-01 RX ADMIN — Medication 1 APPLICATION(S): at 05:21

## 2022-01-01 RX ADMIN — FINASTERIDE 5 MILLIGRAM(S): 5 TABLET, FILM COATED ORAL at 13:26

## 2022-01-01 RX ADMIN — APIXABAN 2.5 MILLIGRAM(S): 2.5 TABLET, FILM COATED ORAL at 05:33

## 2022-01-01 RX ADMIN — MIDODRINE HYDROCHLORIDE 10 MILLIGRAM(S): 2.5 TABLET ORAL at 05:40

## 2022-01-01 RX ADMIN — HYDROMORPHONE HYDROCHLORIDE 0.2 MILLIGRAM(S): 2 INJECTION INTRAMUSCULAR; INTRAVENOUS; SUBCUTANEOUS at 05:40

## 2022-01-01 RX ADMIN — Medication 650 MILLIGRAM(S): at 18:02

## 2022-01-01 RX ADMIN — ROBINUL 0.4 MILLIGRAM(S): 0.2 INJECTION INTRAMUSCULAR; INTRAVENOUS at 13:32

## 2022-01-01 RX ADMIN — LIDOCAINE 1 PATCH: 4 CREAM TOPICAL at 04:13

## 2022-01-01 RX ADMIN — Medication 1 APPLICATION(S): at 06:15

## 2022-01-01 RX ADMIN — Medication 50 MILLILITER(S): at 17:52

## 2022-01-01 RX ADMIN — ALBUTEROL 10 MILLIGRAM(S): 90 AEROSOL, METERED ORAL at 18:38

## 2022-01-01 RX ADMIN — CHLORHEXIDINE GLUCONATE 1 APPLICATION(S): 213 SOLUTION TOPICAL at 06:11

## 2022-01-01 RX ADMIN — Medication 50 MILLILITER(S): at 05:26

## 2022-01-01 RX ADMIN — Medication 50 MILLILITER(S): at 08:29

## 2022-01-01 RX ADMIN — Medication 1 APPLICATION(S): at 17:27

## 2022-01-01 RX ADMIN — Medication 50 MILLILITER(S): at 06:13

## 2022-01-01 RX ADMIN — Medication 1 APPLICATION(S): at 17:03

## 2022-01-01 RX ADMIN — Medication 3 MILLIGRAM(S): at 22:53

## 2022-01-01 RX ADMIN — Medication 650 MILLIGRAM(S): at 05:17

## 2022-01-01 RX ADMIN — Medication 650 MILLIGRAM(S): at 05:36

## 2022-01-01 RX ADMIN — AMLODIPINE BESYLATE 10 MILLIGRAM(S): 2.5 TABLET ORAL at 06:12

## 2022-01-01 RX ADMIN — SODIUM CHLORIDE 75 MILLILITER(S): 9 INJECTION INTRAMUSCULAR; INTRAVENOUS; SUBCUTANEOUS at 15:09

## 2022-01-01 RX ADMIN — Medication 50 MILLILITER(S): at 02:23

## 2022-01-01 RX ADMIN — Medication 650 MILLIGRAM(S): at 05:14

## 2022-01-01 RX ADMIN — Medication 60 MILLIGRAM(S): at 21:30

## 2022-01-01 RX ADMIN — MIDODRINE HYDROCHLORIDE 10 MILLIGRAM(S): 2.5 TABLET ORAL at 12:13

## 2022-01-01 RX ADMIN — SODIUM ZIRCONIUM CYCLOSILICATE 10 GRAM(S): 10 POWDER, FOR SUSPENSION ORAL at 18:13

## 2022-01-01 RX ADMIN — SODIUM ZIRCONIUM CYCLOSILICATE 5 GRAM(S): 10 POWDER, FOR SUSPENSION ORAL at 05:41

## 2022-01-01 RX ADMIN — HYDROMORPHONE HYDROCHLORIDE 0.5 MILLIGRAM(S): 2 INJECTION INTRAMUSCULAR; INTRAVENOUS; SUBCUTANEOUS at 08:51

## 2022-01-01 RX ADMIN — ALBUTEROL 2.5 MILLIGRAM(S): 90 AEROSOL, METERED ORAL at 17:55

## 2022-01-01 RX ADMIN — PANTOPRAZOLE SODIUM 40 MILLIGRAM(S): 20 TABLET, DELAYED RELEASE ORAL at 06:04

## 2022-01-01 RX ADMIN — Medication 50 MILLILITER(S): at 22:26

## 2022-01-01 RX ADMIN — Medication 650 MILLIGRAM(S): at 05:45

## 2022-01-01 RX ADMIN — Medication 650 MILLIGRAM(S): at 13:47

## 2022-01-01 RX ADMIN — FINASTERIDE 5 MILLIGRAM(S): 5 TABLET, FILM COATED ORAL at 11:22

## 2022-01-01 RX ADMIN — Medication 650 MILLIGRAM(S): at 22:23

## 2022-01-01 RX ADMIN — Medication 650 MILLIGRAM(S): at 05:25

## 2022-01-01 RX ADMIN — Medication 650 MILLIGRAM(S): at 05:29

## 2022-01-01 RX ADMIN — Medication 3 MILLIGRAM(S): at 23:23

## 2022-01-01 RX ADMIN — APIXABAN 2.5 MILLIGRAM(S): 2.5 TABLET, FILM COATED ORAL at 17:45

## 2022-01-01 RX ADMIN — Medication 3 MILLIGRAM(S): at 22:28

## 2022-01-01 RX ADMIN — Medication 650 MILLIGRAM(S): at 23:53

## 2022-01-01 RX ADMIN — OXYCODONE HYDROCHLORIDE 5 MILLIGRAM(S): 5 TABLET ORAL at 18:38

## 2022-01-01 RX ADMIN — AMLODIPINE BESYLATE 10 MILLIGRAM(S): 2.5 TABLET ORAL at 06:14

## 2022-01-01 RX ADMIN — Medication 1 APPLICATION(S): at 05:11

## 2022-01-01 RX ADMIN — ALBUTEROL 2.5 MILLIGRAM(S): 90 AEROSOL, METERED ORAL at 15:08

## 2022-01-01 RX ADMIN — CHLORHEXIDINE GLUCONATE 1 APPLICATION(S): 213 SOLUTION TOPICAL at 05:56

## 2022-01-01 RX ADMIN — AMLODIPINE BESYLATE 10 MILLIGRAM(S): 2.5 TABLET ORAL at 05:15

## 2022-01-01 RX ADMIN — Medication 650 MILLIGRAM(S): at 14:44

## 2022-01-01 RX ADMIN — Medication 50 MILLILITER(S): at 05:34

## 2022-01-01 RX ADMIN — AMLODIPINE BESYLATE 10 MILLIGRAM(S): 2.5 TABLET ORAL at 05:31

## 2022-01-01 RX ADMIN — Medication 1 APPLICATION(S): at 17:44

## 2022-01-01 RX ADMIN — Medication 50 MILLILITER(S): at 18:09

## 2022-01-01 RX ADMIN — Medication 0.2 MILLIGRAM(S): at 13:32

## 2022-01-01 RX ADMIN — SODIUM CHLORIDE 75 MILLILITER(S): 9 INJECTION INTRAMUSCULAR; INTRAVENOUS; SUBCUTANEOUS at 13:03

## 2022-01-01 RX ADMIN — SODIUM ZIRCONIUM CYCLOSILICATE 10 GRAM(S): 10 POWDER, FOR SUSPENSION ORAL at 23:10

## 2022-01-01 RX ADMIN — OCTREOTIDE ACETATE 10 MICROGRAM(S)/HR: 200 INJECTION, SOLUTION INTRAVENOUS; SUBCUTANEOUS at 17:55

## 2022-01-01 RX ADMIN — APIXABAN 2.5 MILLIGRAM(S): 2.5 TABLET, FILM COATED ORAL at 18:09

## 2022-01-01 RX ADMIN — AMLODIPINE BESYLATE 10 MILLIGRAM(S): 2.5 TABLET ORAL at 05:07

## 2022-01-01 RX ADMIN — CHLORHEXIDINE GLUCONATE 1 APPLICATION(S): 213 SOLUTION TOPICAL at 06:08

## 2022-01-01 RX ADMIN — Medication 650 MILLIGRAM(S): at 22:52

## 2022-01-01 RX ADMIN — Medication 650 MILLIGRAM(S): at 21:02

## 2022-01-01 RX ADMIN — Medication 650 MILLIGRAM(S): at 15:56

## 2022-01-01 RX ADMIN — SODIUM ZIRCONIUM CYCLOSILICATE 10 GRAM(S): 10 POWDER, FOR SUSPENSION ORAL at 07:06

## 2022-01-01 RX ADMIN — FINASTERIDE 5 MILLIGRAM(S): 5 TABLET, FILM COATED ORAL at 11:52

## 2022-01-01 RX ADMIN — Medication 650 MILLIGRAM(S): at 21:24

## 2022-01-01 RX ADMIN — Medication 650 MILLIGRAM(S): at 05:30

## 2022-01-01 RX ADMIN — Medication 20 MILLIGRAM(S): at 18:13

## 2022-01-01 RX ADMIN — CHLORHEXIDINE GLUCONATE 1 APPLICATION(S): 213 SOLUTION TOPICAL at 05:50

## 2022-01-01 RX ADMIN — APIXABAN 2.5 MILLIGRAM(S): 2.5 TABLET, FILM COATED ORAL at 17:21

## 2022-01-01 RX ADMIN — Medication 650 MILLIGRAM(S): at 05:19

## 2022-01-01 RX ADMIN — MIDODRINE HYDROCHLORIDE 10 MILLIGRAM(S): 2.5 TABLET ORAL at 05:34

## 2022-01-01 RX ADMIN — CHLORHEXIDINE GLUCONATE 1 APPLICATION(S): 213 SOLUTION TOPICAL at 05:10

## 2022-01-01 RX ADMIN — MIDODRINE HYDROCHLORIDE 10 MILLIGRAM(S): 2.5 TABLET ORAL at 05:36

## 2022-01-01 RX ADMIN — PANTOPRAZOLE SODIUM 40 MILLIGRAM(S): 20 TABLET, DELAYED RELEASE ORAL at 05:11

## 2022-01-01 RX ADMIN — Medication 1 APPLICATION(S): at 18:03

## 2022-01-01 RX ADMIN — Medication 650 MILLIGRAM(S): at 15:08

## 2022-01-01 RX ADMIN — SODIUM ZIRCONIUM CYCLOSILICATE 10 GRAM(S): 10 POWDER, FOR SUSPENSION ORAL at 14:26

## 2022-01-01 RX ADMIN — Medication 50 MILLILITER(S): at 17:55

## 2022-01-01 RX ADMIN — FINASTERIDE 5 MILLIGRAM(S): 5 TABLET, FILM COATED ORAL at 12:35

## 2022-01-01 RX ADMIN — SODIUM ZIRCONIUM CYCLOSILICATE 10 GRAM(S): 10 POWDER, FOR SUSPENSION ORAL at 15:46

## 2022-01-01 RX ADMIN — Medication 650 MILLIGRAM(S): at 06:14

## 2022-01-01 RX ADMIN — Medication 3 MILLIGRAM(S): at 22:10

## 2022-01-01 RX ADMIN — HYDROMORPHONE HYDROCHLORIDE 0.2 MILLIGRAM(S): 2 INJECTION INTRAMUSCULAR; INTRAVENOUS; SUBCUTANEOUS at 14:22

## 2022-01-01 RX ADMIN — APIXABAN 2.5 MILLIGRAM(S): 2.5 TABLET, FILM COATED ORAL at 18:46

## 2022-01-01 RX ADMIN — Medication 1 APPLICATION(S): at 05:16

## 2022-01-01 RX ADMIN — Medication 101 MILLIGRAM(S): at 20:31

## 2022-01-01 RX ADMIN — CHLORHEXIDINE GLUCONATE 1 APPLICATION(S): 213 SOLUTION TOPICAL at 12:13

## 2022-01-01 RX ADMIN — AMLODIPINE BESYLATE 10 MILLIGRAM(S): 2.5 TABLET ORAL at 08:25

## 2022-01-01 RX ADMIN — OXYCODONE HYDROCHLORIDE 5 MILLIGRAM(S): 5 TABLET ORAL at 13:14

## 2022-01-01 RX ADMIN — Medication 1 APPLICATION(S): at 17:10

## 2022-01-01 RX ADMIN — CHLORHEXIDINE GLUCONATE 1 APPLICATION(S): 213 SOLUTION TOPICAL at 05:13

## 2022-01-01 RX ADMIN — PANTOPRAZOLE SODIUM 40 MILLIGRAM(S): 20 TABLET, DELAYED RELEASE ORAL at 05:06

## 2022-01-01 RX ADMIN — Medication 650 MILLIGRAM(S): at 21:40

## 2022-01-01 RX ADMIN — Medication 650 MILLIGRAM(S): at 15:16

## 2022-01-01 RX ADMIN — Medication 650 MILLIGRAM(S): at 13:46

## 2022-01-01 RX ADMIN — Medication 1 APPLICATION(S): at 05:44

## 2022-01-01 RX ADMIN — FINASTERIDE 5 MILLIGRAM(S): 5 TABLET, FILM COATED ORAL at 11:53

## 2022-01-01 RX ADMIN — Medication 3 MILLIGRAM(S): at 21:32

## 2022-01-01 RX ADMIN — Medication 1 APPLICATION(S): at 05:29

## 2022-01-01 RX ADMIN — Medication 50 MILLILITER(S): at 17:00

## 2022-01-01 RX ADMIN — HYDROMORPHONE HYDROCHLORIDE 0.5 MILLIGRAM(S): 2 INJECTION INTRAMUSCULAR; INTRAVENOUS; SUBCUTANEOUS at 09:06

## 2022-01-01 RX ADMIN — Medication 650 MILLIGRAM(S): at 13:26

## 2022-01-01 RX ADMIN — FINASTERIDE 5 MILLIGRAM(S): 5 TABLET, FILM COATED ORAL at 11:09

## 2022-01-01 RX ADMIN — LIDOCAINE 1 PATCH: 4 CREAM TOPICAL at 20:46

## 2022-01-01 RX ADMIN — Medication 650 MILLIGRAM(S): at 21:48

## 2022-01-01 RX ADMIN — PANTOPRAZOLE SODIUM 40 MILLIGRAM(S): 20 TABLET, DELAYED RELEASE ORAL at 06:14

## 2022-01-01 RX ADMIN — AMLODIPINE BESYLATE 10 MILLIGRAM(S): 2.5 TABLET ORAL at 05:20

## 2022-01-01 RX ADMIN — Medication 50 MILLILITER(S): at 16:33

## 2022-01-01 RX ADMIN — INSULIN HUMAN 5 UNIT(S): 100 INJECTION, SOLUTION SUBCUTANEOUS at 17:52

## 2022-01-01 RX ADMIN — Medication 1 APPLICATION(S): at 05:14

## 2022-01-01 RX ADMIN — Medication 1 APPLICATION(S): at 17:21

## 2022-01-01 RX ADMIN — Medication 0.2 MILLIGRAM(S): at 08:51

## 2022-01-01 RX ADMIN — HYDROMORPHONE HYDROCHLORIDE 0.2 MILLIGRAM(S): 2 INJECTION INTRAMUSCULAR; INTRAVENOUS; SUBCUTANEOUS at 14:17

## 2022-01-01 RX ADMIN — INSULIN HUMAN 5 UNIT(S): 100 INJECTION, SOLUTION SUBCUTANEOUS at 15:08

## 2022-01-01 RX ADMIN — Medication 50 MILLILITER(S): at 09:42

## 2022-01-01 RX ADMIN — Medication 50 MILLILITER(S): at 01:09

## 2022-01-01 RX ADMIN — APIXABAN 2.5 MILLIGRAM(S): 2.5 TABLET, FILM COATED ORAL at 17:56

## 2022-01-01 RX ADMIN — CHLORHEXIDINE GLUCONATE 1 APPLICATION(S): 213 SOLUTION TOPICAL at 05:58

## 2022-01-01 RX ADMIN — Medication 650 MILLIGRAM(S): at 22:04

## 2022-01-01 RX ADMIN — Medication 50 MILLILITER(S): at 13:03

## 2022-01-01 RX ADMIN — CHLORHEXIDINE GLUCONATE 1 APPLICATION(S): 213 SOLUTION TOPICAL at 08:43

## 2022-01-01 RX ADMIN — Medication 650 MILLIGRAM(S): at 17:07

## 2022-01-01 RX ADMIN — Medication 50 MILLILITER(S): at 22:09

## 2022-01-01 RX ADMIN — APIXABAN 2.5 MILLIGRAM(S): 2.5 TABLET, FILM COATED ORAL at 05:45

## 2022-01-01 RX ADMIN — Medication 50 MILLILITER(S): at 17:22

## 2022-01-01 RX ADMIN — Medication 1 APPLICATION(S): at 18:06

## 2022-01-01 RX ADMIN — Medication 1 APPLICATION(S): at 05:40

## 2022-01-01 RX ADMIN — MIDODRINE HYDROCHLORIDE 10 MILLIGRAM(S): 2.5 TABLET ORAL at 18:12

## 2022-01-01 RX ADMIN — CHLORHEXIDINE GLUCONATE 1 APPLICATION(S): 213 SOLUTION TOPICAL at 05:15

## 2022-01-01 RX ADMIN — APIXABAN 2.5 MILLIGRAM(S): 2.5 TABLET, FILM COATED ORAL at 05:37

## 2022-01-01 RX ADMIN — Medication 60 MILLIGRAM(S): at 05:39

## 2022-01-01 RX ADMIN — Medication 650 MILLIGRAM(S): at 22:24

## 2022-01-01 RX ADMIN — Medication 650 MILLIGRAM(S): at 05:06

## 2022-01-01 RX ADMIN — Medication 50 MILLILITER(S): at 08:15

## 2022-01-01 RX ADMIN — OXYCODONE HYDROCHLORIDE 5 MILLIGRAM(S): 5 TABLET ORAL at 19:33

## 2022-01-01 RX ADMIN — Medication 1 APPLICATION(S): at 05:56

## 2022-01-01 RX ADMIN — PANTOPRAZOLE SODIUM 40 MILLIGRAM(S): 20 TABLET, DELAYED RELEASE ORAL at 05:29

## 2022-01-01 RX ADMIN — Medication 50 MILLILITER(S): at 23:08

## 2022-01-01 RX ADMIN — APIXABAN 2.5 MILLIGRAM(S): 2.5 TABLET, FILM COATED ORAL at 06:02

## 2022-01-01 RX ADMIN — PANTOPRAZOLE SODIUM 40 MILLIGRAM(S): 20 TABLET, DELAYED RELEASE ORAL at 06:08

## 2022-01-01 RX ADMIN — MIDODRINE HYDROCHLORIDE 10 MILLIGRAM(S): 2.5 TABLET ORAL at 17:57

## 2022-01-01 RX ADMIN — Medication 50 MILLILITER(S): at 15:45

## 2022-01-01 RX ADMIN — SODIUM ZIRCONIUM CYCLOSILICATE 10 GRAM(S): 10 POWDER, FOR SUSPENSION ORAL at 13:47

## 2022-01-01 RX ADMIN — MIDODRINE HYDROCHLORIDE 10 MILLIGRAM(S): 2.5 TABLET ORAL at 11:41

## 2022-01-01 RX ADMIN — LIDOCAINE 1 PATCH: 4 CREAM TOPICAL at 20:18

## 2022-01-01 RX ADMIN — LIDOCAINE 1 PATCH: 4 CREAM TOPICAL at 21:39

## 2022-01-01 RX ADMIN — PANTOPRAZOLE SODIUM 40 MILLIGRAM(S): 20 TABLET, DELAYED RELEASE ORAL at 06:16

## 2022-01-01 RX ADMIN — LIDOCAINE 1 PATCH: 4 CREAM TOPICAL at 09:35

## 2022-01-01 RX ADMIN — Medication 650 MILLIGRAM(S): at 23:04

## 2022-01-01 RX ADMIN — PANTOPRAZOLE SODIUM 40 MILLIGRAM(S): 20 TABLET, DELAYED RELEASE ORAL at 06:00

## 2022-01-01 RX ADMIN — Medication 650 MILLIGRAM(S): at 05:32

## 2022-01-01 RX ADMIN — Medication 650 MILLIGRAM(S): at 06:01

## 2022-01-01 RX ADMIN — AMLODIPINE BESYLATE 10 MILLIGRAM(S): 2.5 TABLET ORAL at 05:26

## 2022-01-01 RX ADMIN — Medication 1 APPLICATION(S): at 18:46

## 2022-01-01 RX ADMIN — FINASTERIDE 5 MILLIGRAM(S): 5 TABLET, FILM COATED ORAL at 11:41

## 2022-01-01 RX ADMIN — SODIUM ZIRCONIUM CYCLOSILICATE 10 GRAM(S): 10 POWDER, FOR SUSPENSION ORAL at 16:03

## 2022-01-01 RX ADMIN — HYDROMORPHONE HYDROCHLORIDE 0.2 MILLIGRAM(S): 2 INJECTION INTRAMUSCULAR; INTRAVENOUS; SUBCUTANEOUS at 18:34

## 2022-01-01 RX ADMIN — Medication 1 APPLICATION(S): at 17:08

## 2022-01-01 RX ADMIN — Medication 650 MILLIGRAM(S): at 21:33

## 2022-01-01 RX ADMIN — Medication 650 MILLIGRAM(S): at 18:04

## 2022-01-01 RX ADMIN — APIXABAN 2.5 MILLIGRAM(S): 2.5 TABLET, FILM COATED ORAL at 05:11

## 2022-01-01 RX ADMIN — OCTREOTIDE ACETATE 10 MICROGRAM(S)/HR: 200 INJECTION, SOLUTION INTRAVENOUS; SUBCUTANEOUS at 00:18

## 2022-01-01 RX ADMIN — CHLORHEXIDINE GLUCONATE 1 APPLICATION(S): 213 SOLUTION TOPICAL at 21:31

## 2022-01-01 RX ADMIN — PANTOPRAZOLE SODIUM 40 MILLIGRAM(S): 20 TABLET, DELAYED RELEASE ORAL at 06:44

## 2022-01-01 RX ADMIN — Medication 650 MILLIGRAM(S): at 21:29

## 2022-01-01 RX ADMIN — MIDODRINE HYDROCHLORIDE 10 MILLIGRAM(S): 2.5 TABLET ORAL at 18:08

## 2022-01-01 RX ADMIN — APIXABAN 2.5 MILLIGRAM(S): 2.5 TABLET, FILM COATED ORAL at 18:13

## 2022-01-01 RX ADMIN — LIDOCAINE 1 PATCH: 4 CREAM TOPICAL at 22:22

## 2022-01-01 RX ADMIN — MIDODRINE HYDROCHLORIDE 10 MILLIGRAM(S): 2.5 TABLET ORAL at 05:17

## 2022-01-01 RX ADMIN — Medication 3 MILLIGRAM(S): at 21:48

## 2022-01-01 RX ADMIN — AMLODIPINE BESYLATE 10 MILLIGRAM(S): 2.5 TABLET ORAL at 05:33

## 2022-01-01 RX ADMIN — CHLORHEXIDINE GLUCONATE 1 APPLICATION(S): 213 SOLUTION TOPICAL at 10:20

## 2022-01-01 RX ADMIN — Medication 63.75 MILLIMOLE(S): at 12:35

## 2022-01-01 RX ADMIN — Medication 1 APPLICATION(S): at 17:01

## 2022-01-01 RX ADMIN — MIDODRINE HYDROCHLORIDE 10 MILLIGRAM(S): 2.5 TABLET ORAL at 18:10

## 2022-01-01 RX ADMIN — Medication 1 APPLICATION(S): at 18:05

## 2022-01-01 RX ADMIN — APIXABAN 2.5 MILLIGRAM(S): 2.5 TABLET, FILM COATED ORAL at 06:25

## 2022-01-01 RX ADMIN — Medication 1 APPLICATION(S): at 17:15

## 2022-01-01 RX ADMIN — Medication 650 MILLIGRAM(S): at 14:16

## 2022-01-01 RX ADMIN — OCTREOTIDE ACETATE 10 MICROGRAM(S)/HR: 200 INJECTION, SOLUTION INTRAVENOUS; SUBCUTANEOUS at 17:28

## 2022-01-01 RX ADMIN — Medication 50 MILLILITER(S): at 17:47

## 2022-01-01 RX ADMIN — Medication 50 MILLILITER(S): at 22:11

## 2022-01-01 RX ADMIN — Medication 50 MILLILITER(S): at 05:17

## 2022-01-01 RX ADMIN — Medication 650 MILLIGRAM(S): at 13:04

## 2022-01-01 RX ADMIN — APIXABAN 2.5 MILLIGRAM(S): 2.5 TABLET, FILM COATED ORAL at 18:05

## 2022-01-01 RX ADMIN — Medication 650 MILLIGRAM(S): at 05:44

## 2022-01-01 RX ADMIN — Medication 1 APPLICATION(S): at 05:50

## 2022-01-01 RX ADMIN — APIXABAN 2.5 MILLIGRAM(S): 2.5 TABLET, FILM COATED ORAL at 18:02

## 2022-01-01 RX ADMIN — CHLORHEXIDINE GLUCONATE 1 APPLICATION(S): 213 SOLUTION TOPICAL at 05:46

## 2022-01-01 RX ADMIN — CHLORHEXIDINE GLUCONATE 1 APPLICATION(S): 213 SOLUTION TOPICAL at 05:17

## 2022-01-01 RX ADMIN — Medication 650 MILLIGRAM(S): at 15:10

## 2022-01-01 RX ADMIN — Medication 50 MILLILITER(S): at 12:10

## 2022-01-01 RX ADMIN — HYDROMORPHONE HYDROCHLORIDE 0.2 MILLIGRAM(S): 2 INJECTION INTRAMUSCULAR; INTRAVENOUS; SUBCUTANEOUS at 17:19

## 2022-01-01 RX ADMIN — FINASTERIDE 5 MILLIGRAM(S): 5 TABLET, FILM COATED ORAL at 13:02

## 2022-01-01 RX ADMIN — Medication 50 MILLILITER(S): at 00:07

## 2022-01-01 RX ADMIN — Medication 1 APPLICATION(S): at 05:35

## 2022-01-01 RX ADMIN — PANTOPRAZOLE SODIUM 40 MILLIGRAM(S): 20 TABLET, DELAYED RELEASE ORAL at 05:15

## 2022-01-01 RX ADMIN — FINASTERIDE 5 MILLIGRAM(S): 5 TABLET, FILM COATED ORAL at 11:44

## 2022-01-01 RX ADMIN — APIXABAN 2.5 MILLIGRAM(S): 2.5 TABLET, FILM COATED ORAL at 05:17

## 2022-01-01 RX ADMIN — LIDOCAINE 1 PATCH: 4 CREAM TOPICAL at 09:00

## 2022-01-01 RX ADMIN — Medication 1 APPLICATION(S): at 17:09

## 2022-01-01 RX ADMIN — CHLORHEXIDINE GLUCONATE 1 APPLICATION(S): 213 SOLUTION TOPICAL at 05:33

## 2022-01-01 RX ADMIN — APIXABAN 2.5 MILLIGRAM(S): 2.5 TABLET, FILM COATED ORAL at 05:26

## 2022-06-13 NOTE — ED PROVIDER NOTE - OBJECTIVE STATEMENT
Attn - pt seen in Rm12 - pt c/o increase SOB/HERNANDEZ the last few weeks and increased Cr and LFTs.  pt was able to walk 4 miles at 3%grade on treadmill up until April.  +diarrhea. no chest or abdo pain. no fever.

## 2022-06-13 NOTE — ED ADULT NURSE NOTE - OBJECTIVE STATEMENT
Pt presented to ed with c/o SOB on exertion that started today along with dizziness. pt denies nausea, vomiting, fever, chills, chest pain, headache, dizziness. Will continue to monitor. Pt presented to ed with c/o SOB on exertion that started today along with dizziness/generalized weakness/loss of appetite. pt denies nausea, vomiting, fever, chills, chest pain, headache, dizziness. Will continue to monitor.

## 2022-06-13 NOTE — ED PROVIDER NOTE - PROGRESS NOTE DETAILS
Isi Woodward MD, PGY-2: US ordered given coagulopathy, transaminitis, leukocytosis, although low suspicion for acute choly given no abd ttp. plan for admission after US. will not give reversal agent for INR 3 given no active bleed.

## 2022-06-13 NOTE — ED PROVIDER NOTE - PHYSICAL EXAMINATION
Attn - alert, NAD, + pallor, no jaundice, PERRL 3 mm, moist mm, skin - warm and dry, Lungs - clear, no w/r/r, good BS bilaterally, Cor - rr, no M, no rub, Abdo - ND, soft, NT, no HSM, no CVAT, no guarding or rebound. Extremities - pittring edema, no calf tenderness, distal pulses intact and symmetrical, Neuro - intact and non-focal

## 2022-06-13 NOTE — ED PROVIDER NOTE - CLINICAL SUMMARY MEDICAL DECISION MAKING FREE TEXT BOX
Attn - pt with wt loss, HERNANDEZ, increasing Cr and LFTs.  labs, CXR, UA, admit.  PMD - Dr. Cardona - not on staff.

## 2022-06-13 NOTE — ED ADULT TRIAGE NOTE - CHIEF COMPLAINT QUOTE
pt states sob and weakness symptoms for a while and states his blood enzymes on kidney and liver keeep going up

## 2022-06-13 NOTE — ED ADULT NURSE REASSESSMENT NOTE - NS ED NURSE REASSESS COMMENT FT1
pt received from [facility]LE[facility] in [facility]. Pt upon assessment, A&Ox4, breathing nonlabored on room air, skin warm/dry, denies pain/fevers/chills/nausea/vomiting/diarrhea/chest pain. pt states he still is experiencing SOB. Hep lock intact, labs drawn/sent, vitals taken, all safety measures in place, pt resting comfortably in bed.

## 2022-06-14 NOTE — PATIENT PROFILE ADULT - FALL HARM RISK - HARM RISK INTERVENTIONS

## 2022-06-14 NOTE — PATIENT PROFILE ADULT - FUNCTIONAL ASSESSMENT - DAILY ACTIVITY 5.
Pt called and was informed of Dr. Ruby Wong message below and he verbalized understanding.  Thanks 4 = No assist / stand by assistance

## 2022-06-14 NOTE — CONSULT NOTE ADULT - ASSESSMENT
77yo M with PMH of Afib (on Eliquis), HTN, HLD who presents with worsening dyspnea. Hepatology consulted for cirrhosis and elevated liver tests.    # Elevated liver tests - mostly cholestatic, in a patient with underlying liver disease. Noted elevated WBC, no abdominal pain, no biliary dilatation concerning for cholangitis. Patient appears well. DDx includes cholestasis of sepsis vs. intrinsic liver injury vs. ?biliary obstruction. Noted elevated INR in the setting of eliquis use. No mental status changes.  # ROBERT - on CKD, unclear baseline  # Cirrhosis - MELD unclear etiology, suspect ARTHUR cirrhosis vs. cardiac vs. other.  Ascites - minimal  Varices - unclear  HCC - unknown  PSE - non on PE  # Afib - on eliquis    Recommendations:  - Blood cultures x 2, diagnostic paracentesis  - Agree with MRI abdomen/MRCP  - f/u TTE  - NIYAH, ASMA, AMA, hepatitis panel (HBV, HCV, HAV IgM), EBV PCR, CMV PCR, HSV PCR  - Urine lytes  - Get outpatient records re: baseline labs  - Give vitamin K IV x 3d  - hold statin  - Not a transplant candidate given age    Thank you for involving us in the care of this patient. Please reach out if any further questions.    Theo Mclain, PGY-5  Hepatology Fellow    Available on [employer] Teams  Pager [phone] ([facility]) or [phone] ([facility])  After 5PM/Weekends, please contact the on-call GI fellow: [phone]  Available through Microsoft Teams     77yo M with PMH of Afib (on Eliquis), HTN, HLD who presents with worsening dyspnea. Hepatology consulted for cirrhosis and elevated liver tests.    # Elevated liver tests - mostly cholestatic, in a patient with underlying liver disease. Noted elevated WBC, no abdominal pain, no biliary dilatation concerning for cholangitis. Patient appears well. DDx includes cholestasis of sepsis vs. intrinsic liver injury vs. ?biliary obstruction. Noted elevated INR in the setting of eliquis use. No mental status changes.  # ROBERT - on CKD, unclear baseline  # Cirrhosis - MELD unclear etiology, suspect ARTHUR cirrhosis vs. cardiac vs. other.  Ascites - minimal  Varices - unclear  HCC - unknown  PSE - non on PE  # Afib - on eliquis    Recommendations:  - Blood cultures x 2, diagnostic paracentesis  - Agree with MRI abdomen/MRCP  - f/u TTE  - If TTE is normal would favor albumin 25% 100cc q8h  - NIYAH, ASMA, AMA, hepatitis panel (HBV, HCV, HAV IgM), EBV PCR, CMV PCR, HSV PCR  - Urine lytes  - Get outpatient records re: baseline labs  - Give vitamin K IV x 3d  - hold statin  - Not a transplant candidate given age    Thank you for involving us in the care of this patient. Please reach out if any further questions.    Theo Mclain, PGY-5  Hepatology Fellow    Available on [employer] Teams  Pager [phone] ([facility]) or [phone] ([facility])  After 5PM/Weekends, please contact the on-call GI fellow: [phone]  Available through [employer] Teams

## 2022-06-14 NOTE — H&P ADULT - PROBLEM SELECTOR PLAN 5
- holding HCTZ and valsartan in setting of ROBERT   - Holding metoprolol   - can continue amlodipine     BPH   - Dutasteride

## 2022-06-14 NOTE — CONSULT NOTE ADULT - SUBJECTIVE AND OBJECTIVE BOX
[facility] - [facility]SULT[facility]    76 year old male w/pmh significant for Afib on Eliquis , HTN ,  HLD , presenting for worsening shortness of breath over the past several weeks.   Patient reports previous unlimited exercise tolerance , however, over the past several weeks  he became increasingly dyspneic with exertion , and now  short of breath with minimal activity. Patient reports no chest pain , no palpitations ,  he has some lower extremity edema , no orthopnea and no PND. He reports no cough or fever. Patient was evaluated by his PMD and bloodwork  showed elevated liver tests and decreased renal function. Patient also reports being evaluated by cardiology and echocardiogram reportedly showed no major abnormalities.  Repeat lab  testing showed worsening of renal function and patient was referred to the hospital for further evaluation. Patient reports no dysuria. He reports no alcohol use.     Renal consult for bong on ? ckd with hyperkalemia  pt with dec po intake, on arb and hctz  s/p insulin/albuterol and in nacl 500 bolus in ed  abd us in ed with no hydro  currently denies any n/v/d/c/      PAST MEDICAL & SURGICAL HISTORY:  Hyperlipidemia      Atrial fibrillation      Hypertension      S/P hernia repair        No Known Allergies    Home Medications Reviewed  Hospital Medications:   MEDICATIONS  (STANDING):  ALBUTerol    0.083%. 2.5 milliGRAM(s) Nebulizer once  amLODIPine   Tablet 10 milliGRAM(s) Oral daily  clotrimazole 1% Cream 1 Application(s) Topical two times a day  dextrose 50% Injectable 50 milliLiter(s) IV Push once  finasteride 5 milliGRAM(s) Oral daily  insulin regular  human recombinant 5 Unit(s) IV Push once  pantoprazole    Tablet 40 milliGRAM(s) Oral before breakfast  sodium zirconium cyclosilicate 5 Gram(s) Oral once    SOCIAL HISTORY:  Denies ETOh,Smoking,   FAMILY HISTORY:  FH: emphysema (Mother)      REVIEW OF SYSTEMS:  CONSTITUTIONAL: No weakness, fevers or chills  EYES/ENT: No visual changes;  No vertigo or throat pain   NECK: No pain or stiffness  RESPIRATORY: No cough, wheezing, hemoptysis; No shortness of breath  CARDIOVASCULAR: No chest pain or palpitations.  GASTROINTESTINAL: No abdominal or epigastric pain. No nausea, vomiting, or hematemesis; No diarrhea or constipation. No melena or hematochezia.  GENITOURINARY: No dysuria, frequency, foamy urine, urinary urgency, incontinence or hematuria  NEUROLOGICAL: No numbness or weakness  SKIN: No itching, burning, rashes, or lesions   VASCULAR: No bilateral lower extremity edema.   All other review of systems is negative unless indicated above.    VITALS:  T(F): 98.1 (22 @ 12:10), Max: 98.8 (22 @ 03:22)  HR: 92 (22 @ 12:10)  BP: 115/74 (22 @ 12:10)  RR: 18 (22 @ 12:10)  SpO2: 96% (22 @ 12:10)  Wt(kg): --      PHYSICAL EXAM:  Constitutional: NAD  HEENT: anicteric sclera, oropharynx clear, MMM  Neck: No JVD  Respiratory: b/l rhonchi  Cardiovascular: S1, S2, RRR  Gastrointestinal: BS+, soft, NT/ND  Extremities: +peripheral edema  Neurological: A/O x 3, no focal deficits  Psychiatric: Normal mood, normal affect  : No CVA tenderness. No fisher.   Skin: No rashes      LABS:      135  |  104  |  75<H>  ----------------------------<  147<H>  5.9<H>   |  18<L>  |  3.16<H>    Ca    9.1      2022 12:19    TPro  6.5  /  Alb  2.6<L>  /  TBili  1.8<H>  /  DBili      /  AST  114<H>  /  ALT  93<H>  /  AlkPhos  597<H>      Creatinine Trend: 3.16 <--, 3.12 <--, 3.37 <--, 3.62 <--                        11.2   16.32 )-----------( 219      ( 2022 07:11 )             34.5     Urine Studies:  Urinalysis Basic - ( 2022 22:03 )    Color: Yellow / Appearance: Slightly Turbid / S.022 / pH:   Gluc:  / Ketone: Negative  / Bili: Negative / Urobili: 2 mg/dL   Blood:  / Protein: 30 mg/dL / Nitrite: Negative   Leuk Esterase: Negative / RBC: 2 /hpf / WBC 1 /HPF   Sq Epi:  / Non Sq Epi: 1 /hpf / Bacteria: Negative      Creatinine, Random Urine: 91 mg/dL ( @ 08:58)  Protein/Creatinine Ratio Calculation: 0.2 Ratio ( @ 08:58)  Creatinine, Random Urine: 91 mg/dL ( @ 08:58)    RADIOLOGY & ADDITIONAL STUDIES:

## 2022-06-14 NOTE — CONSULT NOTE ADULT - SUBJECTIVE AND OBJECTIVE BOX
Chief Complaint:  Patient is a 76y old  Male who presents with a chief complaint of SOB, fatigue, achiness that began in April.     HPI: RD 76M mhx afib on Eliquis, HTN, HLD, BPH, fatty liver who presented with increasing SOB, fatigue and achiness that began in April found to have cirrhosis and small perihepatic ascites on RUQ US in the ED. Pt saw his PCP last week at which time he was found to have elevating liver enzymes and worsening renal function. He came in last night after being prompted by his wife who felt that he was in a daze. In April, pt noticed he was more SOB but did not seek care because his wife was in the hospital and he was taking care of her. Patient states that one week ago he was evaluated outpatient for LE edema and was switched to a higher dose of HCTZ on Friday. Since Friday he has had diarrhea and improvement in his swelling. He continues to urinate every 1.5-2 hours each day. He has been more irritable which he attributes to the SOB and not being able to do what he wants to. His appetite has been decreased over the past few days. Denies any CP, palpitations, confusion, constipation, fever. No alcohol use. Patient notes that he went fishing 3 weeks ago and fell when he got home but denies hitting his head.     In ED, T 97.6-98.8, HR , //76, RR 18-20, SpO2 . Given IVF for hyponatremia (132 -> 136). Eliquis held i/s/o elevated INR (3.01 -> 2.51). RUQ US performed which showed cirrhosis, small perihepatic ascites, and R renal parenchymal echogenicity. His HCTZ, Valsartan, Metoprolol, and Simvastatin were held i/s/o ROBERT and elevated LFTs. Echocardiogram performed, awaiting results, i/s/o elevated proBNP (8026).     Allergies:  No Known Allergies    Home Medications:  Patient states that wife has a sheet with the medications and will be coming in shortly.  · 	Eliquis 5 mg oral tablet: Last Dose Taken:  , 1 tab(s) orally 2 times a day  · 	metoprolol succinate 25 mg oral tablet, extended release: Last Dose Taken:  , 1 tab(s) orally once a day  · 	simvastatin 10 mg oral tablet: 1 tab(s) orally once a day (at bedtime)  · 	amLODIPine 10 mg oral tablet: Last Dose Taken:  , 1 tab(s) orally once a day  · 	dutasteride 0.5 mg oral capsule: Last Dose Taken:  , 1 cap(s) orally once a day  · 	valsartan 160 mg oral tablet: 1 tab(s) orally once a day  · 	pantoprazole 40 mg oral delayed release tablet: Last Dose Taken:  , 1 tab(s) orally once a day  · 	hydroCHLOROthiazide 25 mg oral tablet: Last Dose Taken:  , 1 tab(s) orally once a day    Hospital Medications:  acetaminophen     Tablet .. 650 milliGRAM(s) Oral every 8 hours PRN  amLODIPine   Tablet 10 milliGRAM(s) Oral daily  clotrimazole 1% Cream 1 Application(s) Topical two times a day  finasteride 5 milliGRAM(s) Oral daily  melatonin 3 milliGRAM(s) Oral at bedtime PRN  ondansetron Injectable 4 milliGRAM(s) IV Push every 8 hours PRN  pantoprazole    Tablet 40 milliGRAM(s) Oral before breakfast  sodium zirconium cyclosilicate 5 Gram(s) Oral once      PMHX/PSHX:  Hyperlipidemia  Atrial fibrillation  Hypertension  HLD  BPH  Fatty liver  S/P hernia repair    Family history:  FH: emphysema (Mother)    Denies family history of colon cancer/polyps, stomach cancer/polyps, pancreatic cancer/masses, liver cancer/disease, ovarian cancer and endometrial cancer.    Social History:   Tob: 50 pack-years, former smoker  EtOH: As above (some in the 50's)  Illicit Drugs: Denies    ROS:   General:  No fevers, chills, night sweats. + Recent weight loss i/s/o diuresis. Fatigue and achiness.   ENT:  No sore throat, pain, runny nose, dysphagia  CV:  No pain, palpitations, hypo/hypertension  Pulm:  No cough, tachypnea, wheezing. + SOB  GI:  As per HPI  :  No pain, bleeding, incontinence, nocturia  Muscle:  + general achiness  Neuro:  No weakness, tingling, memory problems  Psych:  No fatigue, insomnia, mood problems, depression  Endocrine:  No polyuria, polydipsia, cold/heat intolerance  Heme:  No petechiae, easy bruisability. +ecchymoses.   Skin:  No rash, tattoos, scars, edema    PHYSICAL EXAM:   GENERAL:  No acute distress  HEENT:  Normocephalic/atraumatic, no scleral icterus  CHEST:  No accessory muscle use  HEART:  Regular rate and rhythm  ABDOMEN:  Soft, non-tender, non-distended, normoactive bowel sounds,  no masses, no splenomegaly. +Liver stiff and nodular on palpation.   EXTREMITIES: No cyanosis, clubbing. + trace to 1+ edema b/l LE (improved per patient)  SKIN:  No rash. + Minor bruising on forearms though exam limited as patient was examined in the hallway.   NEURO:  Alert and oriented x 3, no asterixis    Vital Signs:  Vital Signs Last 24 Hrs  T(C): 36.7 (2022 12:10), Max: 37.1 (2022 03:22)  T(F): 98.1 (2022 12:10), Max: 98.8 (2022 03:22)  HR: 92 (2022 12:10) (82 - 101)  BP: 115/74 (2022 12:10) (100/70 - 132/76)  RR: 18 (2022 12:10) (18 - 20)  SpO2: 96% (2022 12:10) (96% - 100%)  Daily Height in cm: 175.26 (2022 12:56)      LABS:                        11.2   16.32 )-----------( 219      ( 2022 07:11 )             34.5     Mean Cell Volume: 94.0 fl (- @ 07:11)        136  |  103  |  78<H>  ----------------------------<  112<H>  5.8<H>   |  17<L>  |  3.12<H>    Ca    9.0      2022 07:11    TPro  6.5  /  Alb  2.6<L>  /  TBili  1.8<H>  /  DBili  x   /  AST  114<H>  /  ALT  93<H>  /  AlkPhos  597<H>      LIVER FUNCTIONS - ( 2022 07:11 )  Alb: 2.6 g/dL / Pro: 6.5 g/dL / ALK PHOS: 597 U/L / ALT: 93 U/L / AST: 114 U/L / GGT: x           PT/INR - ( 2022 07:11 )   PT: 29.4 sec;   INR: 2.51 ratio         PTT - ( 2022 16:51 )  PTT:37.3 sec  Urinalysis Basic - ( 2022 22:03 )    Color: Yellow / Appearance: Slightly Turbid / S.022 / pH: x  Gluc: x / Ketone: Negative  / Bili: Negative / Urobili: 2 mg/dL   Blood: x / Protein: 30 mg/dL / Nitrite: Negative   Leuk Esterase: Negative / RBC: 2 /hpf / WBC 1 /HPF   Sq Epi: x / Non Sq Epi: 1 /hpf / Bacteria: Negative                          11.2   16.32 )-----------( 219      ( 2022 07:11 )             34.5                         11.6   16.29 )-----------( 273      ( 2022 16:51 )             35.9       Imaging:  < from: US Abdomen Upper Quadrant Right (22 @ 20:06) >  FINDINGS:  Liver: Heterogeneous coarsening with increased echogenicity. Nodular   surface contour. Small volume perihepatic ascites. Prominent lianne   hepatic lymph node. Main portal vein normal directional flow.  Bile ducts: Normal caliber. Commonbile duct measures 5 mm.  Gallbladder: Contracted. Mild diffuse wall thickening, nonspecific.   Sonographer notes indicate a negative sonographic Cortez's sign.  Pancreas: Visualized portions are within normal limits.  Right kidney: 9.6 cm. No hydronephrosis. Increased parenchymal   echogenicity.  Ascites: Small volume.  IVC: Visualized portions are within normal limits.    IMPRESSION:    Cirrhosis with small volume perihepatic ascites    Increased right renal parenchymal echogenicity suggests medical renal   disease. No hydronephrosis    < end of copied text >   Chief Complaint:  Patient is a 76y old  Male who presents with a chief complaint of SOB, fatigue, achiness that began in April.     HPI: RD 76M mhx afib on Eliquis, HTN, HLD, BPH, fatty liver who presented with increasing SOB, fatigue and achiness that began in April found to have cirrhosis and small perihepatic ascites on RUQ US in the ED. Pt saw his PCP last week at which time he was found to have elevating liver enzymes and worsening renal function. He came in last night after being prompted by his wife who felt that he was in a daze. In April, pt noticed he was more SOB but did not seek care because his wife was in the hospital and he was taking care of her. Patient states that one week ago he was evaluated outpatient for LE edema and was switched to a higher dose of HCTZ on Friday. Since Friday he has had diarrhea and improvement in his swelling. He continues to urinate every 1.5-2 hours each day. He has been more irritable which he attributes to the SOB and not being able to do what he wants to. His appetite has been decreased over the past few days. Denies any CP, palpitations, confusion, constipation, fever. No alcohol use. Patient notes that he went fishing 3 weeks ago and fell when he got home but denies hitting his head. Patient states that he has had an endoscopy in the past which was normal and believes that he is due for a colonoscopy but this his prior colonoscopy was unremarkable.     In ED, T 97.6-98.8, HR , //76, RR 18-20, SpO2 . Given IVF for hyponatremia (132 -> 136). Eliquis held i/s/o elevated INR (3.01 -> 2.51). RUQ US performed which showed cirrhosis, small perihepatic ascites, and R renal parenchymal echogenicity. His HCTZ, Valsartan, Metoprolol, and Simvastatin were held i/s/o ROBERT and elevated LFTs. Echocardiogram performed, awaiting results, i/s/o elevated proBNP (8026).     Allergies:  No Known Allergies    Home Medications:  Patient states that wife has a sheet with the medications and will be coming in shortly.  · 	Eliquis 5 mg oral tablet: Last Dose Taken:  , 1 tab(s) orally 2 times a day  · 	metoprolol succinate 25 mg oral tablet, extended release: Last Dose Taken:  , 1 tab(s) orally once a day  · 	simvastatin 10 mg oral tablet: 1 tab(s) orally once a day (at bedtime)  · 	amLODIPine 10 mg oral tablet: Last Dose Taken:  , 1 tab(s) orally once a day  · 	dutasteride 0.5 mg oral capsule: Last Dose Taken:  , 1 cap(s) orally once a day  · 	valsartan 160 mg oral tablet: 1 tab(s) orally once a day  · 	pantoprazole 40 mg oral delayed release tablet: Last Dose Taken:  , 1 tab(s) orally once a day  · 	hydroCHLOROthiazide 25 mg oral tablet: Last Dose Taken:  , 1 tab(s) orally once a day    Hospital Medications:  acetaminophen     Tablet .. 650 milliGRAM(s) Oral every 8 hours PRN  amLODIPine   Tablet 10 milliGRAM(s) Oral daily  clotrimazole 1% Cream 1 Application(s) Topical two times a day  finasteride 5 milliGRAM(s) Oral daily  melatonin 3 milliGRAM(s) Oral at bedtime PRN  ondansetron Injectable 4 milliGRAM(s) IV Push every 8 hours PRN  pantoprazole    Tablet 40 milliGRAM(s) Oral before breakfast  sodium zirconium cyclosilicate 5 Gram(s) Oral once      PMHX/PSHX:  Hyperlipidemia  Atrial fibrillation  Hypertension  HLD  BPH  Fatty liver  S/P hernia repair    Family history:  FH: emphysema (Mother)    Denies family history of colon cancer/polyps, stomach cancer/polyps, pancreatic cancer/masses, liver cancer/disease, ovarian cancer and endometrial cancer.    Social History:   Tob: 50 pack-years, former smoker  EtOH: As above (some in the 50's)  Illicit Drugs: Denies    ROS:   General:  No fevers, chills, night sweats. + Recent weight loss i/s/o diuresis. Fatigue and achiness.   ENT:  No sore throat, pain, runny nose, dysphagia  CV:  No pain, palpitations, hypo/hypertension  Pulm:  No cough, tachypnea, wheezing. + SOB  GI:  As per HPI  :  No pain, bleeding, incontinence, nocturia  Muscle:  + general achiness  Neuro:  No weakness, tingling, memory problems  Psych:  No fatigue, insomnia, mood problems, depression  Endocrine:  No polyuria, polydipsia, cold/heat intolerance  Heme:  No petechiae, easy bruisability. +ecchymoses.   Skin:  No rash, tattoos, scars, edema    PHYSICAL EXAM:   GENERAL:  No acute distress  HEENT:  Normocephalic/atraumatic, no scleral icterus  CHEST:  No accessory muscle use  HEART:  Regular rate and rhythm  ABDOMEN:  Soft, non-tender, non-distended, normoactive bowel sounds,  no masses, no splenomegaly. +Liver stiff and nodular on palpation.   EXTREMITIES: No cyanosis, clubbing. + trace to 1+ edema b/l LE (improved per patient)  SKIN:  No rash. + Minor bruising on forearms though exam limited as patient was examined in the hallway.   NEURO:  Alert and oriented x 3, no asterixis    Vital Signs:  Vital Signs Last 24 Hrs  T(C): 36.7 (2022 12:10), Max: 37.1 (2022 03:22)  T(F): 98.1 (2022 12:10), Max: 98.8 (2022 03:22)  HR: 92 (2022 12:10) (82 - 101)  BP: 115/74 (2022 12:10) (100/70 - 132/76)  RR: 18 (2022 12:10) (18 - 20)  SpO2: 96% (2022 12:10) (96% - 100%)  Daily Height in cm: 175.26 (2022 12:56)      LABS:                        11.2   16.32 )-----------( 219      ( 2022 07:11 )             34.5     Mean Cell Volume: 94.0 fl (22 @ 07:11)        136  |  103  |  78<H>  ----------------------------<  112<H>  5.8<H>   |  17<L>  |  3.12<H>    Ca    9.0      2022 07:11    TPro  6.5  /  Alb  2.6<L>  /  TBili  1.8<H>  /  DBili  x   /  AST  114<H>  /  ALT  93<H>  /  AlkPhos  597<H>      LIVER FUNCTIONS - ( 2022 07:11 )  Alb: 2.6 g/dL / Pro: 6.5 g/dL / ALK PHOS: 597 U/L / ALT: 93 U/L / AST: 114 U/L / GGT: x           PT/INR - ( 2022 07:11 )   PT: 29.4 sec;   INR: 2.51 ratio         PTT - ( 2022 16:51 )  PTT:37.3 sec  Urinalysis Basic - ( 2022 22:03 )    Color: Yellow / Appearance: Slightly Turbid / S.022 / pH: x  Gluc: x / Ketone: Negative  / Bili: Negative / Urobili: 2 mg/dL   Blood: x / Protein: 30 mg/dL / Nitrite: Negative   Leuk Esterase: Negative / RBC: 2 /hpf / WBC 1 /HPF   Sq Epi: x / Non Sq Epi: 1 /hpf / Bacteria: Negative                          11.2   16.32 )-----------( 219      ( 2022 07:11 )             34.5                         11.6   16.29 )-----------( 273      ( 2022 16:51 )             35.9       Imaging:  < from: US Abdomen Upper Quadrant Right (22 @ 20:06) >  FINDINGS:  Liver: Heterogeneous coarsening with increased echogenicity. Nodular   surface contour. Small volume perihepatic ascites. Prominent lianne   hepatic lymph node. Main portal vein normal directional flow.  Bile ducts: Normal caliber. Commonbile duct measures 5 mm.  Gallbladder: Contracted. Mild diffuse wall thickening, nonspecific.   Sonographer notes indicate a negative sonographic Cortez's sign.  Pancreas: Visualized portions are within normal limits.  Right kidney: 9.6 cm. No hydronephrosis. Increased parenchymal   echogenicity.  Ascites: Small volume.  IVC: Visualized portions are within normal limits.    IMPRESSION:    Cirrhosis with small volume perihepatic ascites    Increased right renal parenchymal echogenicity suggests medical renal   disease. No hydronephrosis    < end of copied text >

## 2022-06-14 NOTE — H&P ADULT - HISTORY OF PRESENT ILLNESS
Patient is a 76 year old male w/pmh significant for Afib on coumadin, HLD , presenting for    Patient is a 76 year old male w/pmh significant for Afib on Eliquis , HTN ,  HLD , presenting for worsening shortness of breath over the past several weeks.   Patient reports previous unlimited exercise tolerance , however, over the past several weeks  he became increasingly dyspneic with exertion , and now  short of breath with minimal activity. Patient reports no chest pain , no palpitations ,  he has some lower extremity edema , no orthopnea and no PND. He reports no cough or fever. Patient was evaluated by his PMD and bloodwork  showed elevated liver tests and decreased renal function. Patient also reports being evaluated by cardiology and echocardiogram reportedly showed no major abnormalities.  Repeat lab  testing showed worsening of renal function and patient was referred to the hospital for further evaluation. Patient reports no dysuria. He reports no alcohol use.

## 2022-06-14 NOTE — H&P ADULT - NSHPREVIEWOFSYSTEMS_GEN_ALL_CORE
CONSTITUTIONAL: No weakness, fevers or chills  EYES/ENT: No visual changes;  No dysphagia  NECK: No pain or stiffness  RESPIRATORY: No cough, wheezing, hemoptysis; + shortness of breath  CARDIOVASCULAR: No chest pain or palpitations; + lower extremity edema  EXTREMITIES:+  le edema, no cyanosis, clubbing  GASTROINTESTINAL: No abdominal or epigastric pain. No nausea, vomiting, or hematemesis; + diarrhea  no constipation. No melena or hematochezia.  BACK: No back pain  GENITOURINARY: No dysuria, frequency or hematuria  NEUROLOGICAL: No numbness or weakness  MSK: no joint swelling or pain  SKIN: No itching, burning, rashes, or lesions   PSYCH: no agitation  All other review of systems is negative unless indicated above.

## 2022-06-14 NOTE — H&P ADULT - ASSESSMENT
76 M w/pmh significant for Afib on Eliquis , HTN ,  HLD , p/w HERNANDEZ x few weeks , labs notable for ROBERT and elevated liver tests

## 2022-06-14 NOTE — H&P ADULT - NSHPPHYSICALEXAM_GEN_ALL_CORE
Vital Signs Last 24 Hrs  T(C): 36.6 (13 Jun 2022 21:11), Max: 36.6 (13 Jun 2022 21:11)  T(F): 97.8 (13 Jun 2022 21:11), Max: 97.8 (13 Jun 2022 21:11)  HR: 82 (13 Jun 2022 21:11) (82 - 101)  BP: 106/57 (13 Jun 2022 21:11) (100/70 - 110/63)  BP(mean): --  RR: 18 (13 Jun 2022 21:11) (18 - 20)  SpO2: 100% (13 Jun 2022 21:11) (96% - 100%) Vital Signs Last 24 Hrs  T(C): 36.6 (13 Jun 2022 21:11), Max: 36.6 (13 Jun 2022 21:11)  T(F): 97.8 (13 Jun 2022 21:11), Max: 97.8 (13 Jun 2022 21:11)  HR: 82 (13 Jun 2022 21:11) (82 - 101)  BP: 106/57 (13 Jun 2022 21:11) (100/70 - 110/63)  BP(mean): --  RR: 18 (13 Jun 2022 21:11) (18 - 20)  SpO2: 100% (13 Jun 2022 21:11) (96% - 100%)    GENERAL: No acute distress, well-developed  HEAD:  Atraumatic, Normocephalic  ENT: EOMI, PERRLA, conjunctiva and sclera clear,  moist mucosa no pharyngeal erythema or exudates   NECK: supple , no JVD   CHEST/LUNG: Clear to auscultation bilaterally; No wheeze, equal breath sounds bilaterally   BACK: No spinal tenderness,  No CVA tenderness   HEART: Regular rate and rhythm; No murmurs, rubs, or gallops  ABDOMEN: Soft, Nontender, minimally distended; Bowel sounds present  EXTREMITIES:  No clubbing, cyanosis, or edema  MSK: No joint swelling or effusions, ROM intact   PSYCH: Normal behavior/affect  NEUROLOGY: AAOx3, non-focal, cranial nerves intact  SKIN: Normal color, No rashes or lesions

## 2022-06-14 NOTE — PROGRESS NOTE ADULT - SUBJECTIVE AND OBJECTIVE BOX
Patient is a 76y old  Male who presents with a chief complaint of ROBERT and elevated liver tests (2022 14:23)      SUBJECTIVE / OVERNIGHT EVENTS:    Events noted.  CONSTITUTIONAL: No fever,  or fatigue  RESPIRATORY: No cough, wheezing,  No shortness of breath  CARDIOVASCULAR: No chest pain, palpitations, dizziness, or leg swelling  GASTROINTESTINAL: No abdominal or epigastric pain. No nausea, vomiting.      MEDICATIONS  (STANDING):  amLODIPine   Tablet 10 milliGRAM(s) Oral daily  clotrimazole 1% Cream 1 Application(s) Topical two times a day  finasteride 5 milliGRAM(s) Oral daily  pantoprazole    Tablet 40 milliGRAM(s) Oral before breakfast  sodium bicarbonate 650 milliGRAM(s) Oral three times a day  sodium chloride 0.9%. 1000 milliLiter(s) (75 mL/Hr) IV Continuous <Continuous>    MEDICATIONS  (PRN):  acetaminophen     Tablet .. 650 milliGRAM(s) Oral every 8 hours PRN Temp greater or equal to 38C (100.4F), Mild Pain (1 - 3)  melatonin 3 milliGRAM(s) Oral at bedtime PRN Insomnia  ondansetron Injectable 4 milliGRAM(s) IV Push every 8 hours PRN Nausea and/or Vomiting        CAPILLARY BLOOD GLUCOSE      POCT Blood Glucose.: 185 mg/dL (2022 15:36)  POCT Blood Glucose.: 132 mg/dL (2022 14:59)  POCT Blood Glucose.: 134 mg/dL (2022 14:09)    I&O's Summary    2022 07:01  -  15 Ernesto 2022 00:46  --------------------------------------------------------  IN: 0 mL / OUT: 350 mL / NET: -350 mL        T(C): 36.8 (22 @ 21:15), Max: 37.1 (22 @ 03:22)  HR: 93 (22 @ 21:15) (85 - 99)  BP: 114/73 (22 @ 21:15) (101/67 - 132/76)  RR: 18 (22 @ 21:15) (18 - 18)  SpO2: 99% (22 @ 21:15) (96% - 99%)    PHYSICAL EXAM:    NECK: Supple, No JVD  CHEST/LUNG: Clear to auscultation bilaterally; No wheezing.  HEART: Regular rate and rhythm; No murmurs, rubs, or gallops  ABDOMEN: Soft, Nontender, Nondistended; Bowel sounds present  EXTREMITIES:   No edema  NEUROLOGY: AAO       LABS:                        11.2   16.32 )-----------( 219      ( 2022 07:11 )             34.5         136  |  103  |  71<H>  ----------------------------<  125<H>  4.8   |  17<L>  |  2.68<H>    Ca    8.9      2022 19:44    TPro  6.5  /  Alb  2.6<L>  /  TBili  1.8<H>  /  DBili  x   /  AST  114<H>  /  ALT  93<H>  /  AlkPhos  597<H>      PT/INR - ( 2022 07:11 )   PT: 29.4 sec;   INR: 2.51 ratio         PTT - ( 2022 16:51 )  PTT:37.3 sec      Urinalysis Basic - ( 2022 22:03 )    Color: Yellow / Appearance: Slightly Turbid / S.022 / pH: x  Gluc: x / Ketone: Negative  / Bili: Negative / Urobili: 2 mg/dL   Blood: x / Protein: 30 mg/dL / Nitrite: Negative   Leuk Esterase: Negative / RBC: 2 /hpf / WBC 1 /HPF   Sq Epi: x / Non Sq Epi: 1 /hpf / Bacteria: Negative      CAPILLARY BLOOD GLUCOSE      POCT Blood Glucose.: 185 mg/dL (2022 15:36)  POCT Blood Glucose.: 132 mg/dL (2022 14:59)  POCT Blood Glucose.: 134 mg/dL (2022 14:09)        RADIOLOGY & ADDITIONAL TESTS:    Imaging Personally Reviewed:    Consultant(s) Notes Reviewed:      Care Discussed with Consultants/Other Providers:    Matheus Clarke MD, CMD, FACP    [id number] [place] [address]  Office Tel: [phone]  Cell: [phone]

## 2022-06-14 NOTE — H&P ADULT - PROBLEM SELECTOR PLAN 1
abd sono w/ findings suggestive of medical renal disease; suspect pre-renal vs. hepatorenal , UA neg for infection   - urine Na/ creatinine   - IV fluid challenge , 500 @ 125/hr , if tolerates , can give another 500cc  - hold HCTZ   - Hold Valsartan   - Hold metoprolol , as may be contributing to hepatorenal  - monitor K   - If no improvement , renal consult to be arranged by day team

## 2022-06-14 NOTE — H&P ADULT - PROBLEM SELECTOR PLAN 3
BNP elevated ,  CXR clear , no evidence for coronary obstruction , mild troponin elevation likely 2/2 poor renal clearance ,  low suspicion for PE as patient on Eliquis   - echocardiogram

## 2022-06-14 NOTE — H&P ADULT - PROBLEM SELECTOR PLAN 4
- hold Eliquis  as INR 3 ,  can resume once INR <2   - holding metoprolol since may contribute to hepato-renal

## 2022-06-14 NOTE — H&P ADULT - PROBLEM SELECTOR PLAN 2
cirrhotic morphology on US abdomen   , no prior h/o liver dz : MELD 25   - MRCP   - Acute hepatitis panel   - Hepatology consult - GI emailed , day team to f/u recommendations  - hold satin

## 2022-06-14 NOTE — CONSULT NOTE ADULT - ASSESSMENT
RD is a 76M with MHx of Afib on Eliquis, HTN, HLD, BPH, known fatty liver who presented with increasing SOB, fatigue and achiness that began in April and elevated liver enzymes on outpatient evaluation 4 days prior. Hepatology consulted for abnormal liver function tests and new finding of cirrhosis of small perihepatic ascites on RUQ US.     # Abnormal liver function tests - Likely acute on chronic liver injury. Etiology of acute exacerbation unclear at this point. Patient with known longstanding fatty liver now found to have cirrhosis on US. INR elevated but patient is mentating well so is not in liver failure. No history of alcohol use. Differential for cirrhosis includes ARTHUR cirrhosis, cardiac cirrhosis, chronic viral hepatitis. Acute exacerbation can be 2/2 cholestasis of sepsis, cholestasis of other etiology, infection. Patient with elevated alk phos to 597 and AST//93 as well as urobilinogen on UA which would be pointing towards a cholestatic picture over a hepatocellular picture. No biliary tract congestion appreciated on RUQ US. Labs could also be consistent with chronic liver damage meaning there may not be many enzymes to spill leading to AST/ALT lower than actual function would dictate. proBNP elevated to 8026 which can be related to cirrhosis (cirrhotic cardiomyopathy, hyperdynamic state) and worsened by concurrent ROBERT though echo done to r/o cardiac etiology. Unlikely to be PBC, PSC, or autoimmune hepatitis given the patient's age.   - MRCP  - Diagnostic paracentesis if enough ascitic fluid is present  - Hepatitis B and C panel    # ROBERT - Pre-renal vs. hepatorenal. RUQ US showed R renal parenchymal echogenicity. BUN/scr 78/3.12 improved after IV fluids. HCTZ, Valsartan, Metoprolol held. K being monitored. UA negative for infection.   - Management per primary team. Renal to be consulted as they see fit.     # Leukocytosis - WBC 16.32 with neutrophilic predominance. Patient has never had a fever since arrival. Patient has been fatigued but denies weight loss other than after increasing his diuretic last week. Hgb 11.2. UA negative for infection. CXR clear. Small perihepatic ascites seen on RUQ US.    - Diagnostic paracentesis  - Consider hematologic workup though more likely infectious etiology    # LE edema - Unclear etiology of LE edema requiring increasing diuresis last week. Cardiac vs. hepatic vs. renal etiologies or a combination.   - awaiting results of echocardiogram  - obtain collateral from outpatient provider prescribing diuretics (Cardiologist or PCP)    # Dyspnea - proBNP elevated to 8026 with mild troponin elevation, likely confounded by ROBERT with poor renal clearance. No clinical evidence of coronary obstruction. Patient has a 50 pack-year history of smoking. Neutrophilic predominant leukocytosis with clear CXR. Given his history of LE edema of unclear etiology, he may have been fluid overloaded but continues to be SOB even with improved swelling on increased HCTZ. Questionable underlying congestive cardiac disease.   - awaiting results of echocardiogram.     # Paroxysmal atrial fibrillation - Eliquis held i/s/o elevated INR. Continue to hold and will reevaluate. Resume with INR<2. Metoprolol held i/s/o ROBERT.     # HTN - Valsartan, Metoprolol, HCTZ held i/s/o ROBERT. /74.     # HLD - Simvastatin held temporarily i/s/o elevated LFTs.     # BPH - Continue Dutasteride   RD is a 76M with MHx of Afib on Eliquis, HTN, HLD, BPH, known fatty liver who presented with increasing SOB, fatigue and achiness that began in April and elevated liver enzymes on outpatient evaluation 4 days prior. Hepatology consulted for abnormal liver function tests and new finding of cirrhosis of small perihepatic ascites on RUQ US.     # Abnormal liver function tests - Likely acute on chronic liver injury. Etiology of acute exacerbation unclear at this point. Patient with known longstanding fatty liver now found to have cirrhosis on US. INR elevated but patient is mentating well so is not in liver failure. No history of alcohol use. Differential for cirrhosis includes ARTHUR cirrhosis, cardiac cirrhosis, chronic viral hepatitis. Acute exacerbation can be 2/2 cholestasis of sepsis, cholestasis of other etiology, infection. Patient with elevated alk phos to 597 and AST//93 as well as urobilinogen on UA which would be pointing towards a cholestatic picture over a hepatocellular picture. No biliary tract congestion appreciated on RUQ US. Labs could also be consistent with chronic liver damage meaning there may not be many enzymes to spill leading to AST/ALT lower than actual function would dictate. proBNP elevated to 8026 which can be related to cirrhosis (cirrhotic cardiomyopathy, hyperdynamic state) and worsened by concurrent ROBERT though echo done to r/o cardiac etiology. Unlikely to be PBC, PSC, or autoimmune hepatitis given the patient's age.   - MRCP  - Diagnostic paracentesis if enough ascitic fluid is present  - Hepatitis B and C panel  - EGD either inpatient or outpatient to assess for esophageal varices.   - No clinical evidence of hepatic encephalopathy at this time. Monitor for asterixis or changes in mental status (irritability, confusion). Check ammonia.   - No abx ppx for SBP at this time.   - Patient is not a candidate for liver transplant given his age. MELD 30 today from 33 yesterday.     # ROBERT - Pre-renal vs. hepatorenal. RUQ US showed R renal parenchymal echogenicity. BUN/scr 78/3.12 improved after IV fluids. HCTZ, Valsartan, Metoprolol held. K being monitored. UA negative for infection.   - Management per primary team. Renal to be consulted as they see fit.     # Leukocytosis - WBC 16.32 with neutrophilic predominance. Patient has never had a fever since arrival. Patient has been fatigued but denies weight loss other than after increasing his diuretic last week. Hgb 11.2. UA negative for infection. CXR clear. Small perihepatic ascites seen on RUQ US.    - Diagnostic paracentesis  - Consider hematologic workup though more likely infectious etiology    # LE edema - Unclear etiology of LE edema requiring increasing diuresis last week. Cardiac vs. hepatic vs. renal etiologies or a combination.   - awaiting results of echocardiogram  - obtain collateral from outpatient provider prescribing diuretics (Cardiologist or PCP)    # Dyspnea - proBNP elevated to 8026 with mild troponin elevation, likely confounded by ROBERT with poor renal clearance. No clinical evidence of coronary obstruction. Patient has a 50 pack-year history of smoking. Neutrophilic predominant leukocytosis with clear CXR. Given his history of LE edema of unclear etiology, he may have been fluid overloaded but continues to be SOB even with improved swelling on increased HCTZ. Questionable underlying congestive cardiac disease.   - awaiting results of echocardiogram.     # Paroxysmal atrial fibrillation - Eliquis held i/s/o elevated INR. Continue to hold and will reevaluate. Resume with INR<2. Metoprolol held i/s/o ROBERT.     # HTN - Valsartan, Metoprolol, HCTZ held i/s/o ROBERT. /74.     # HLD - Simvastatin held temporarily i/s/o elevated LFTs.     # BPH - Continue Dutasteride   **INCOMPLETE UNTIL REVIEWED BY ATTENDING**    RD is a 76M with MHx of Afib on Eliquis, HTN, HLD, BPH, known fatty liver who presented with increasing SOB, fatigue and achiness that began in April and elevated liver enzymes on outpatient evaluation 4 days prior. Hepatology consulted for abnormal liver function tests and new finding of cirrhosis of small perihepatic ascites on RUQ US.     # Abnormal liver function tests - Likely acute on chronic liver injury. Etiology of acute exacerbation unclear at this point. Patient with known longstanding fatty liver now found to have cirrhosis on US. INR elevated but patient is mentating well so is not in liver failure. No history of alcohol use. Differential for cirrhosis includes ARTHUR cirrhosis, cardiac cirrhosis, chronic viral hepatitis. Acute exacerbation can be 2/2 cholestasis of sepsis, cholestasis of other etiology, infection. Patient with elevated alk phos to 597 and AST//93 as well as urobilinogen on UA which would be pointing towards a cholestatic picture over a hepatocellular picture. No biliary tract congestion appreciated on RUQ US. Labs could also be consistent with chronic liver damage meaning there may not be many enzymes to spill leading to AST/ALT lower than actual function would dictate. proBNP elevated to 8026 which can be related to cirrhosis (cirrhotic cardiomyopathy, hyperdynamic state) and worsened by concurrent ROBERT though echo done to r/o cardiac etiology. Unlikely to be PBC, PSC, or autoimmune hepatitis given the patient's age.   - MRCP  - Diagnostic paracentesis if enough ascitic fluid is present  - Hepatitis B and C panel  - EGD either inpatient or outpatient to assess for esophageal varices.   - No clinical evidence of hepatic encephalopathy at this time. Monitor for asterixis or changes in mental status (irritability, confusion). Check ammonia.   - No abx ppx for SBP at this time.   - Patient is not a candidate for liver transplant given his age. MELD 30 today from 33 yesterday.     # ROBERT - Pre-renal vs. hepatorenal. RUQ US showed R renal parenchymal echogenicity. BUN/scr 78/3.12 improved after IV fluids. HCTZ, Valsartan, Metoprolol held. K being monitored. UA negative for infection.   - Management per primary team. Renal to be consulted as they see fit.     # Leukocytosis - WBC 16.32 with neutrophilic predominance. Patient has never had a fever since arrival. Patient has been fatigued but denies weight loss other than after increasing his diuretic last week. Hgb 11.2. UA negative for infection. CXR clear. Small perihepatic ascites seen on RUQ US.    - Diagnostic paracentesis  - Consider hematologic workup though more likely infectious etiology    # LE edema - Unclear etiology of LE edema requiring increasing diuresis last week. Cardiac vs. hepatic vs. renal etiologies or a combination.   - awaiting results of echocardiogram  - obtain collateral from outpatient provider prescribing diuretics (Cardiologist or PCP)    # Dyspnea - proBNP elevated to 8026 with mild troponin elevation, likely confounded by ROBERT with poor renal clearance. No clinical evidence of coronary obstruction. Patient has a 50 pack-year history of smoking. Neutrophilic predominant leukocytosis with clear CXR. Given his history of LE edema of unclear etiology, he may have been fluid overloaded but continues to be SOB even with improved swelling on increased HCTZ. Questionable underlying congestive cardiac disease.   - awaiting results of echocardiogram.     # Paroxysmal atrial fibrillation - Eliquis held i/s/o elevated INR. Continue to hold and will reevaluate. Resume with INR<2. Metoprolol held i/s/o ROBERT.     # HTN - Valsartan, Metoprolol, HCTZ held i/s/o ROBERT. /74.     # HLD - Simvastatin held temporarily i/s/o elevated LFTs.     # BPH - Continue Dutasteride

## 2022-06-14 NOTE — CONSULT NOTE ADULT - ASSESSMENT
76 year old male w/pmh significant for Afib on Eliquis , HTN ,  HLD , presenting for worsening shortness of breath found to have ROBERT on ? CKD and hyperkalemia with metabolic acidosis      1) ROBERT on ?CKD  ddx includes pre renal 2/2 dec po intake and hctz vs ATN from arb/ hemodynamic changes vs ckd progression  pt not aware of underlying kidney disease--> may have component of Hypertensive Nephropathy  recheck ua  check urine na/cr/cl/osm/k  Check renal us ( abd us only commented on right kidney)  agree with keeping off ARB and HCTZ  will place on iV nacl @ 75cc to help in forward flow  avoid nephrotoxins  trend bmp q12h       2) Hyperkalemia  in setting of robert/ckd/hctz/arb  s/p insulin/albuterol and nacl bolus  agree with lokelma but will change dose to 10 grams po xq8h x 2 doses  iv nacl @ 75cc to help inc forward flow  NO k diet  renal diet  trend k q8h    3) Metabolic acidosis-  insetting of robert/ckd  start nabicarb 650mg po tid  trend bicarb        Dr Romero  [phone]

## 2022-06-14 NOTE — H&P ADULT - NSHPLABSRESULTS_GEN_ALL_CORE
Labs personally reviewed:                          11.6   16.29 )-----------( 273      ( 2022 16:51 )             35.9     06-13    133<L>  |  99  |  77<H>  ----------------------------<  210<H>  4.6   |  15<L>  |  3.37<H>    Ca    8.7      2022 20:43    TPro  7.6  /  Alb  3.2<L>  /  TBili  1.4<H>  /  DBili  x   /  AST  196<H>  /  ALT  122<H>  /  AlkPhos  711<H>  06-13        LIVER FUNCTIONS - ( 2022 16:51 )  Alb: 3.2 g/dL / Pro: 7.6 g/dL / ALK PHOS: 711 U/L / ALT: 122 U/L / AST: 196 U/L / GGT: x           PT/INR - ( 2022 16:51 )   PT: 35.3 sec;   INR: 3.01 ratio         PTT - ( 2022 16:51 )  PTT:37.3 sec  Urinalysis Basic - ( 2022 22:03 )    Color: Yellow / Appearance: Slightly Turbid / S.022 / pH: x  Gluc: x / Ketone: Negative  / Bili: Negative / Urobili: 2 mg/dL   Blood: x / Protein: 30 mg/dL / Nitrite: Negative   Leuk Esterase: Negative / RBC: 2 /hpf / WBC 1 /HPF   Sq Epi: x / Non Sq Epi: 1 /hpf / Bacteria: Negative      CAPILLARY BLOOD GLUCOSE      POCT Blood Glucose.: 143 mg/dL (2022 18:18)  POCT Blood Glucose.: 122 mg/dL (2022 17:51)      Imaging:  CXR personally reviewed: Trace left pleural effusion but otherwise clear lungs.  Us abdomen: Cirrhosis with small volume perihepatic ascites    Increased right renal parenchymal echogenicity suggests medical renal   disease. No hydronephrosis      EKG personally reviewed: Labs personally reviewed:                          11.6   16.29 )-----------( 273      ( 2022 16:51 )             35.9     06-13    133<L>  |  99  |  77<H>  ----------------------------<  210<H>  4.6   |  15<L>  |  3.37<H>    Ca    8.7      2022 20:43    TPro  7.6  /  Alb  3.2<L>  /  TBili  1.4<H>  /  DBili  x   /  AST  196<H>  /  ALT  122<H>  /  AlkPhos  711<H>  -13        LIVER FUNCTIONS - ( 2022 16:51 )  Alb: 3.2 g/dL / Pro: 7.6 g/dL / ALK PHOS: 711 U/L / ALT: 122 U/L / AST: 196 U/L / GGT: x           PT/INR - ( 2022 16:51 )   PT: 35.3 sec;   INR: 3.01 ratio         PTT - ( 2022 16:51 )  PTT:37.3 sec  Urinalysis Basic - ( 2022 22:03 )    Color: Yellow / Appearance: Slightly Turbid / S.022 / pH: x  Gluc: x / Ketone: Negative  / Bili: Negative / Urobili: 2 mg/dL   Blood: x / Protein: 30 mg/dL / Nitrite: Negative   Leuk Esterase: Negative / RBC: 2 /hpf / WBC 1 /HPF   Sq Epi: x / Non Sq Epi: 1 /hpf / Bacteria: Negative      CAPILLARY BLOOD GLUCOSE      POCT Blood Glucose.: 143 mg/dL (2022 18:18)  POCT Blood Glucose.: 122 mg/dL (2022 17:51)      Imaging:  CXR personally reviewed: Trace left pleural effusion but otherwise clear lungs.  Us abdomen: Cirrhosis with small volume perihepatic ascites    Increased right renal parenchymal echogenicity suggests medical renal   disease. No hydronephrosis      EKG personally reviewed: sinus rhythm at 68 bpm , 1st degree av block rbbb

## 2022-06-14 NOTE — CONSULT NOTE ADULT - SUBJECTIVE AND OBJECTIVE BOX
Chief Complaint:  Patient is a 76y old  Male who presents with a chief complaint of ROBERT and elevated liver tests (2022 13:31)      HPI:  Mr. Coulter is a 77yo M with PMH of Afib (on Eliquis), HTN, HLD who presents with worsening dyspnea. Hepatology consulted for cirrhosis.    Patient was in his usual state of health until several weeks prior to admission when he noticed worsening dyspnea on exertion and LE swelling. No orthopnea, chest pain, fever or cough. He was seen at his PCP's office - noted ot have newly elevated liver tests and decreased renal function. Reports normal TTE as outpatient. He was referred to the ED for further evaluation.    In regards to his liver history, he reports longstanding diagnosis of NAFLD. Reports distant history of significant EtOH use (~50y prior), abstinent since. No known viral hepatitis. No IVDU. Denies OTC medications, herbal supplements. No history of jaundice or confusion.     In the ED: VSS, RUQ US done with cirrhosis appearing liver      Allergies:  No Known Allergies      Home Medications:  amLODIPine 10 mg oral tablet: 1 tab(s) orally once a day (2022 11:14)  dutasteride 0.5 mg oral capsule: 1 cap(s) orally once a day (2022 11:14)  Eliquis 5 mg oral tablet: 1 tab(s) orally 2 times a day (2022 11:14)  hydroCHLOROthiazide 25 mg oral tablet: 1 tab(s) orally once a day (2022 11:14)  metoprolol succinate 25 mg oral tablet, extended release: 1 tab(s) orally once a day (2022 11:14)  pantoprazole 40 mg oral delayed release tablet: 1 tab(s) orally once a day (2022 11:14)  simvastatin 10 mg oral tablet: 1 tab(s) orally once a day (at bedtime) (2022 11:14)  valsartan 160 mg oral tablet: 1 tab(s) orally once a day (2022 11:14)    Hospital Medications:  acetaminophen     Tablet .. 650 milliGRAM(s) Oral every 8 hours PRN  ALBUTerol    0.083%. 2.5 milliGRAM(s) Nebulizer once  amLODIPine   Tablet 10 milliGRAM(s) Oral daily  clotrimazole 1% Cream 1 Application(s) Topical two times a day  dextrose 50% Injectable 50 milliLiter(s) IV Push once  finasteride 5 milliGRAM(s) Oral daily  insulin regular  human recombinant 5 Unit(s) IV Push once  melatonin 3 milliGRAM(s) Oral at bedtime PRN  ondansetron Injectable 4 milliGRAM(s) IV Push every 8 hours PRN  pantoprazole    Tablet 40 milliGRAM(s) Oral before breakfast  sodium bicarbonate 650 milliGRAM(s) Oral three times a day  sodium chloride 0.9%. 1000 milliLiter(s) IV Continuous <Continuous>  sodium zirconium cyclosilicate 10 Gram(s) Oral every 8 hours      PMHX/PSHX:  Hyperlipidemia    Atrial fibrillation    Hypertension    S/P hernia repair        Family history:  FH: emphysema (Mother)        Denies family history of colon cancer/polyps, stomach cancer/polyps, pancreatic cancer/masses, liver cancer/disease, ovarian cancer and endometrial cancer.    Social History:     Tob: Denies  EtOH: As above  Illicit Drugs: Denies    ROS:   General:  No wt loss, fevers, chills, night sweats, fatigue  Eyes:  Good vision, no reported pain  ENT:  No sore throat, pain, runny nose, dysphagia  CV:  No pain, palpitations, hypo/hypertension  Pulm:  No dyspnea, cough, tachypnea, wheezing  GI:  As per HPI  :  No pain, bleeding, incontinence, nocturia  Muscle:  No pain, weakness  Neuro:  No weakness, tingling, memory problems  Psych:  No fatigue, insomnia, mood problems, depression  Endocrine:  No polyuria, polydipsia, cold/heat intolerance  Heme:  No petechiae, ecchymosis, easy bruisability  Skin:  No rash, tattoos, scars, edema    PHYSICAL EXAM:   GENERAL:  No acute distress  HEENT:  Normocephalic/atraumatic, no scleral icterus  CHEST:  No accessory muscle use  HEART:  Regular rate and rhythm  ABDOMEN:  Soft, non-tender, non-distended  EXTREMITIES: No cyanosis, clubbing, +1 edema bilaterally  SKIN:  No rash  NEURO:  Alert and oriented x 3, no asterixis    Vital Signs:  Vital Signs Last 24 Hrs  T(C): 36.7 (2022 12:10), Max: 37.1 (2022 03:22)  T(F): 98.1 (2022 12:10), Max: 98.8 (2022 03:22)  HR: 92 (2022 12:10) (82 - 94)  BP: 115/74 (2022 12:10) (106/57 - 132/76)  BP(mean): --  RR: 18 (2022 12:10) (18 - 18)  SpO2: 96% (2022 12:10) (96% - 100%)  Daily     Daily     LABS:                        11.2   16.32 )-----------( 219      ( 2022 07:11 )             34.5     Mean Cell Volume: 94.0 fl (- @ 07:11)        135  |  104  |  75<H>  ----------------------------<  147<H>  5.9<H>   |  18<L>  |  3.16<H>    Ca    9.1      2022 12:19    TPro  6.5  /  Alb  2.6<L>  /  TBili  1.8<H>  /  DBili  x   /  AST  114<H>  /  ALT  93<H>  /  AlkPhos  597<H>      LIVER FUNCTIONS - ( 2022 07:11 )  Alb: 2.6 g/dL / Pro: 6.5 g/dL / ALK PHOS: 597 U/L / ALT: 93 U/L / AST: 114 U/L / GGT: x           PT/INR - ( 2022 07:11 )   PT: 29.4 sec;   INR: 2.51 ratio         PTT - ( 2022 16:51 )  PTT:37.3 sec  Urinalysis Basic - ( 2022 22:03 )    Color: Yellow / Appearance: Slightly Turbid / S.022 / pH: x  Gluc: x / Ketone: Negative  / Bili: Negative / Urobili: 2 mg/dL   Blood: x / Protein: 30 mg/dL / Nitrite: Negative   Leuk Esterase: Negative / RBC: 2 /hpf / WBC 1 /HPF   Sq Epi: x / Non Sq Epi: 1 /hpf / Bacteria: Negative                              11.2   16.32 )-----------( 219      ( 2022 07:11 )             34.5                         11.6   16.29 )-----------( 273      ( 2022 16:51 )             35.9       Imaging:

## 2022-06-15 NOTE — PROGRESS NOTE ADULT - ASSESSMENT
77yo M with PMH of Afib (on Eliquis), HTN, HLD who presents with worsening dyspnea. Hepatology consulted for cirrhosis and elevated liver tests.    # Elevated liver tests - Noted infiltrative disease on MRI (w/o contrast) - ddx includes HCC vs. metastatic disease  # ROBERT - on CKD, unclear baseline  # Cirrhosis - MELD 31. Unclear etiology, suspect ARTHUR cirrhosis vs. cardiac vs. other.  Ascites - minimal  Varices - unclear  HCC - unknown  PSE - non on PE  # Afib - on eliquis    Recommendations:  - MRI abdomen with contrast to evaluate for HCC  - Please do not give normal saline in a cirrhotic patient as will likely make the ascites worse  - start albumin 25% 100cc q8h  - Blood cultures x 2, diagnostic paracentesis  - Agree with MRI abdomen/MRCP  - please check NIYAH, ASMA, AMA, hepatitis panel (HBV, HCV, HAV IgM), EBV PCR, CMV PCR, HSV PCR  - Urine lytes  - Get outpatient records re: baseline labs  - Give vitamin K IV x 3d  - hold statin  - Not a transplant candidate given age    Thank you for involving us in the care of this patient. Please reach out if any further questions.    Theo Mclain, PGY-5  Hepatology Fellow    Available on [employer] Teams  Pager [phone] ([facility]) or [phone] ([facility])  After 5PM/Weekends, please contact the on-call GI fellow: [phone]  Available through [employer] Teams     75yo M with PMH of Afib (on Eliquis), HTN, HLD who presents with worsening dyspnea. Hepatology consulted for cirrhosis and elevated liver tests.    # Elevated liver tests - Noted infiltrative disease on MRI (w/o contrast) - ddx includes HCC vs. metastatic disease  # ROBERT - on CKD, unclear baseline  # Cirrhosis - MELD 31. Unclear etiology, suspect ARTHUR cirrhosis vs. cardiac vs. other.  Ascites - minimal  Varices - unclear  HCC - unknown  PSE - non on PE  # Afib - on eliquis    Recommendations:  - Check AFP, CEA, CA 19-9  - MRI abdomen with contrast to evaluate for HCC  - Please do not give normal saline in a cirrhotic patient as will likely make the ascites worse  - start albumin 25% 100cc q8h  - Blood cultures x 2, diagnostic paracentesis  - Agree with MRI abdomen/MRCP  - please check NIYAH, ASMA, AMA, hepatitis panel (HBV, HCV, HAV IgM), EBV PCR, CMV PCR, HSV PCR  - Urine lytes  - Get outpatient records re: baseline labs  - Give vitamin K IV x 3d  - hold statin  - Not a transplant candidate given age    Thank you for involving us in the care of this patient. Please reach out if any further questions.    Theo Mclain, PGY-5  Hepatology Fellow    Available on [employer] Teams  Pager [phone] ([facility]) or [phone] ([facility])  After 5PM/Weekends, please contact the on-call GI fellow: [phone]  Available through [employer] Teams

## 2022-06-15 NOTE — PROGRESS NOTE ADULT - SUBJECTIVE AND OBJECTIVE BOX
Patient seen and examined  no complaints  says he feels better    No Known Allergies    Hospital Medications:   MEDICATIONS  (STANDING):  amLODIPine   Tablet 10 milliGRAM(s) Oral daily  chlorhexidine 2% Cloths 1 Application(s) Topical <User Schedule>  clotrimazole 1% Cream 1 Application(s) Topical two times a day  finasteride 5 milliGRAM(s) Oral daily  pantoprazole    Tablet 40 milliGRAM(s) Oral before breakfast  sodium bicarbonate 650 milliGRAM(s) Oral three times a day  sodium chloride 0.9%. 1000 milliLiter(s) (75 mL/Hr) IV Continuous <Continuous>      VITALS:  T(F): 98.2 (06-15-22 @ 04:14), Max: 98.2 (22 @ 21:15)  HR: 92 (06-15-22 @ 04:14)  BP: 117/75 (06-15-22 @ 04:14)  RR: 18 (06-15-22 @ 04:14)  SpO2: 96% (06-15-22 @ 04:14)  Wt(kg): --     @ 07:01  -  06-15 @ 07:00  --------------------------------------------------------  IN: 0 mL / OUT: 750 mL / NET: -750 mL    06-15 @ 07:01  -  06-15 @ 13:58  --------------------------------------------------------  IN: 240 mL / OUT: 0 mL / NET: 240 mL        PHYSICAL EXAM:  Constitutional: NAD  HEENT: anicteric sclera, oropharynx clear, MMM  Neck: No JVD  Respiratory: b/l rhonchi  Cardiovascular: S1, S2, RRR  Gastrointestinal: BS+, soft, NT/ND  Extremities: +peripheral edema  Neurological: A/O x 3, no focal deficits  Psychiatric: Normal mood, normal affect  : No CVA tenderness. No fisher.   Skin: No rashes  LABS:      136  |  103  |  71<H>  ----------------------------<  125<H>  4.8   |  17<L>  |  2.68<H>    Ca    8.9      2022 19:44    TPro  6.5  /  Alb  2.6<L>  /  TBili  1.8<H>  /  DBili      /  AST  114<H>  /  ALT  93<H>  /  AlkPhos  597<H>      Creatinine Trend: 2.68 <--, 3.16 <--, 3.12 <--, 3.37 <--, 3.62 <--                        11.2   16.32 )-----------( 219      ( 2022 07:11 )             34.5     Urine Studies:  Urinalysis Basic - ( 15 Ernesto 2022 07:35 )    Color: Yellow / Appearance: Slightly Turbid / S.017 / pH:   Gluc:  / Ketone: Negative  / Bili: Negative / Urobili: 2 mg/dL   Blood:  / Protein: Trace / Nitrite: Negative   Leuk Esterase: Negative / RBC: 5 /hpf / WBC 0 /HPF   Sq Epi:  / Non Sq Epi: 0 /hpf / Bacteria: Negative      Potassium, Random Urine: 21 mmol/L (06-15 @ 07:35)  Osmolality, Random Urine: 569 mos/kg (06-15 @ 07:35)  Sodium, Random Urine: 72 mmol/L (06-15 @ 07:35)  Chloride, Random Urine: 66 mmol/L (06-15 @ 07:35)  Creatinine, Random Urine: 81 mg/dL (06-15 @ 07:35)  Protein/Creatinine Ratio Calculation: 0.2 Ratio (06-15 @ 07:35)  Creatinine, Random Urine: 91 mg/dL ( @ 08:58)  Protein/Creatinine Ratio Calculation: 0.2 Ratio ( @ 08:58)  Creatinine, Random Urine: 91 mg/dL ( @ 08:58)    RADIOLOGY & ADDITIONAL STUDIES:

## 2022-06-15 NOTE — CONSULT NOTE ADULT - SUBJECTIVE AND OBJECTIVE BOX
HPI:  Patient is a 76 year old male w/pmh significant for Afib on Eliquis , HTN ,  HLD , presenting for worsening shortness of breath over the past several weeks.   Patient reports previous unlimited exercise tolerance , however, over the past several weeks  he became increasingly dyspneic with exertion , and now  short of breath with minimal activity. Patient reports no chest pain , no palpitations ,  he has some lower extremity edema , no orthopnea and no PND. He reports no cough or fever. Patient was evaluated by his PMD and bloodwork  showed elevated liver tests and decreased renal function. Patient also reports being evaluated by cardiology and echocardiogram reportedly showed no major abnormalities.  Repeat lab  testing showed worsening of renal function and patient was referred to the hospital for further evaluation. Patient reports no dysuria. He reports no alcohol use.      (14 Jun 2022 03:05)    PAST MEDICAL & SURGICAL HISTORY:  Hyperlipidemia      Atrial fibrillation      Hypertension      S/P hernia repair          No Known Allergies      FAMILY HISTORY:  FH: emphysema (Mother)      Social History:   , lives with wife   former smoker 2ppd x many years , quit about 25 years ago   no alcohol use (14 Jun 2022 03:05)    Medications:  acetaminophen     Tablet .. 650 milliGRAM(s) Oral every 8 hours PRN Temp greater or equal to 38C (100.4F), Mild Pain (1 - 3)  amLODIPine   Tablet 10 milliGRAM(s) Oral daily  clotrimazole 1% Cream 1 Application(s) Topical two times a day  finasteride 5 milliGRAM(s) Oral daily  melatonin 3 milliGRAM(s) Oral at bedtime PRN Insomnia  ondansetron Injectable 4 milliGRAM(s) IV Push every 8 hours PRN Nausea and/or Vomiting  pantoprazole    Tablet 40 milliGRAM(s) Oral before breakfast  sodium bicarbonate 650 milliGRAM(s) Oral three times a day  sodium chloride 0.9%. 1000 milliLiter(s) IV Continuous <Continuous>    Labs:                        11.2   16.32 )-----------( 219      ( 14 Jun 2022 07:11 )             34.5     06-14    136  |  103  |  71<H>  ----------------------------<  125<H>  4.8   |  17<L>  |  2.68<H>    Ca    8.9      14 Jun 2022 19:44    TPro  6.5  /  Alb  2.6<L>  /  TBili  1.8<H>  /  DBili  x   /  AST  114<H>  /  ALT  93<H>  /  AlkPhos  597<H>  06-14    Radiology:     < from: MR MRCP No Cont (06.14.22 @ 17:21) >  IMPRESSION:  Extensive bilobar infiltrative hepatic neoplasm as described above for   which the differential diagnosis includes infiltrative hepatocellular   neoplasm such as hepatocellular carcinoma as well as metastatic disease.   Consider image guided sampling for diagnosis.    < end of copied text >          ROS:  No pain, no fever  No lumps in neck or pain  No Sob or chest pain  No palpitations   No abdominal pain. No change in bowel habit. No blood in stools  No weakness in extremities  No leg swelling  Rest of comprehensive ROS was negative    Vital Signs Last 24 Hrs  T(C): 36.8 (15 Ernesto 2022 04:14), Max: 36.8 (14 Jun 2022 21:15)  T(F): 98.2 (15 Ernesto 2022 04:14), Max: 98.2 (14 Jun 2022 21:15)  HR: 92 (15 Ernesto 2022 04:14) (92 - 99)  BP: 117/75 (15 Ernesto 2022 04:14) (101/67 - 118/78)  BP(mean): --  RR: 18 (15 Ernesto 2022 04:14) (18 - 18)  SpO2: 96% (15 Ernesto 2022 04:14) (96% - 99%)    Physical Exam:  Patient comfortable  AXOX3  Neck supple, no LN's  Chest: bilateral breath sounds, no wheeze or rales  CVS: regular heart rate without murmur  Abdomen: soft, BS+, no massess or organomegaly  CNS: no gross deficit  Musculoskeletal: Normal range of motion  Skin: no rash       HPI:  Patient is a 76 year old male w/pmh significant for Afib on Eliquis , HTN ,  HLD , admitted for worsening shortness of breath over the several weeks PTA on 6/14/22  Patient reported previous unlimited exercise tolerance , however, over the past several weeks  he became increasingly dyspneic with exertion , and now  short of breath with minimal activity. Patient reported no chest pain , no palpitations ,  he had some lower extremity edema , no orthopnea and no PND. He reported no cough or fever. Patient was evaluated by his PMD and bloodwork  showed elevated liver tests and decreased renal function. Patient also reported being evaluated by cardiology and echocardiogram reportedly showed no major abnormalities.  Repeat lab  testing showed worsening of renal function and patient was referred to the hospital for further evaluation. Patient reports no dysuria. He reports no alcohol use.   Seen for MRCP findings suggestive of infiltrative neoplasm  Seen by renal and hepatology      PAST MEDICAL & SURGICAL HISTORY:  Hyperlipidemia      Atrial fibrillation      Hypertension      S/P hernia repair          No Known Allergies      FAMILY HISTORY:  FH: emphysema (Mother)      Social History:   , lives with wife   former smoker 2ppd x many years , quit about 25 years ago   no alcohol use (14 Jun 2022 03:05), now but used to drink bofore    Medications:  acetaminophen     Tablet .. 650 milliGRAM(s) Oral every 8 hours PRN Temp greater or equal to 38C (100.4F), Mild Pain (1 - 3)  amLODIPine   Tablet 10 milliGRAM(s) Oral daily  clotrimazole 1% Cream 1 Application(s) Topical two times a day  finasteride 5 milliGRAM(s) Oral daily  melatonin 3 milliGRAM(s) Oral at bedtime PRN Insomnia  ondansetron Injectable 4 milliGRAM(s) IV Push every 8 hours PRN Nausea and/or Vomiting  pantoprazole    Tablet 40 milliGRAM(s) Oral before breakfast  sodium bicarbonate 650 milliGRAM(s) Oral three times a day  sodium chloride 0.9%. 1000 milliLiter(s) IV Continuous <Continuous>    Labs:                        11.2   16.32 )-----------( 219      ( 14 Jun 2022 07:11 )             34.5     06-14    136  |  103  |  71<H>  ----------------------------<  125<H>  4.8   |  17<L>  |  2.68<H>    Ca    8.9      14 Jun 2022 19:44    TPro  6.5  /  Alb  2.6<L>  /  TBili  1.8<H>  /  DBili  x   /  AST  114<H>  /  ALT  93<H>  /  AlkPhos  597<H>  06-14  Prothrombin Time, Plasma: 29.4: Effective February 23,2022, the reference range has changed. sec   INR: 2.51: Recommended targets/ranges for therapeutic INR:   Activated Partial Thromboplastin Time: 37.3: The recommended therapeutic heparin range (full dose) is 58-99 seconds.     Radiology:     < from: MR MRCP No Cont (06.14.22 @ 17:21) >  IMPRESSION:  Extensive bilobar infiltrative hepatic neoplasm as described above for   which the differential diagnosis includes infiltrative hepatocellular   neoplasm such as hepatocellular carcinoma as well as metastatic disease.   Consider image guided sampling for diagnosis.    < end of copied text >          ROS:  No pain, no fever  No lumps in neck or pain  No Sob or chest pain now decreased effort tolerance  No palpitations   No abdominal pain. No change in bowel habit. No blood in stools  No weakness in extremities  No leg swelling  Rest of comprehensive ROS was negative    Vital Signs Last 24 Hrs  T(C): 36.8 (15 Ernesto 2022 04:14), Max: 36.8 (14 Jun 2022 21:15)  T(F): 98.2 (15 Ernesto 2022 04:14), Max: 98.2 (14 Jun 2022 21:15)  HR: 92 (15 Ernesto 2022 04:14) (92 - 99)  BP: 117/75 (15 Ernesto 2022 04:14) (101/67 - 118/78)  BP(mean): --  RR: 18 (15 Ernesto 2022 04:14) (18 - 18)  SpO2: 96% (15 Ernesto 2022 04:14) (96% - 99%)    Physical Exam:  Patient comfortable  AXOX3  Neck supple, no LN's  Chest: bilateral breath sounds, no wheeze or rales  CVS: S1S2  Abdomen: soft, BS+, no massess or organomegaly  CNS: no gross deficit  Musculoskeletal: Normal range of motion  Skin: no rash

## 2022-06-15 NOTE — ADVANCED PRACTICE NURSE CONSULT - ASSESSMENT
The pt was encountered on 4DSU- MR Coulter was able to turn himself to his side independently. Upon assessment, he presents with skin changes to the b/l buttocks. there is a deep red tone to the skin on the inner aspects of both buttocks. the skin is intact, the area measures 10cmx 8cm x0cm. A linear healed scar was noted just above the buttocks and extends up to the sacrum: pt reports a hx of pilonidal cyst surgery in the past   Pt denies pain, itching or burning in the buttocks area- he does report decreased activity at home and "sitting a lot" Suggest that these skin changes may be MASD versus Deep tissue injury- will recommend to continue to monitor and to apply Shukri with pericare. Pt states he is constipated at present but has had stool incontinence in the past.  Dr Clarke was at the bedside and saw the pts skin.  The pt is in a ow air-loss surface, will recommend boots for off-loading.  As the pt was a/w a pressur ienjury, a nutrition consult is pending for further evaluation.

## 2022-06-15 NOTE — ADVANCED PRACTICE NURSE CONSULT - REASON FOR CONSULT
Requested by staff to assess skin status of pt a/w a pressure injury. PMH is noted:  Patient is a 76 year old male w/pmh significant for Afib on Eliquis , HTN ,  HLD , presenting for worsening shortness of breath over the past several weeks.   Patient reports previous unlimited exercise tolerance , however, over the past several weeks  he became increasingly dyspneic with exertion , and now  short of breath with minimal activity. Patient reports no chest pain , no palpitations ,  he has some lower extremity edema , no orthopnea and no PND. He reports no cough or fever. Patient was evaluated by his PMD and bloodwork  showed elevated liver tests and decreased renal function. Patient also reports being evaluated by cardiology and echocardiogram reportedly showed no major abnormalities.  Repeat lab  testing showed worsening of renal function and patient was referred to the hospital for further evaluation. Patient reports no dysuria. He reports no alcohol use.

## 2022-06-15 NOTE — CONSULT NOTE ADULT - ASSESSMENT
Hepatology saw patient, MRI planned bx IR 77yo M with PMH of Afib (on Eliquis), HTN, HLD admitted with worsening dyspnea. Seen for imaging suggestive of cirrhosis/infiltrative disease. Has elevated LFT' and ROBERT on ? CKD, seen by nephrology  Cirrhosis could be ETOH, ARTHUR, Cardiac etc, benitez being done  Follow hepatitis profile AFP, CEA, CA 19-9, NIYAH, ASMA, AMA,  MRI abdomen with contrast to evaluate for HCC  Diagnostic paracentesis as per hepatology  Will need bx liver by IR depending on results of pending tests.  His PTT and PT may also be increased from Eliquis in addition to liver disease, follow trend  If still high on follow up will order further eval including mixing tests and appropriate factor levels as it will need to be corrected before bx

## 2022-06-15 NOTE — PROGRESS NOTE ADULT - SUBJECTIVE AND OBJECTIVE BOX
Interval Events:   - MRI done, read pending  - No events overnight  - Feeling well    Allergies:  No Known Allergies      Hospital Medications:  acetaminophen     Tablet .. 650 milliGRAM(s) Oral every 8 hours PRN  amLODIPine   Tablet 10 milliGRAM(s) Oral daily  clotrimazole 1% Cream 1 Application(s) Topical two times a day  finasteride 5 milliGRAM(s) Oral daily  melatonin 3 milliGRAM(s) Oral at bedtime PRN  ondansetron Injectable 4 milliGRAM(s) IV Push every 8 hours PRN  pantoprazole    Tablet 40 milliGRAM(s) Oral before breakfast  sodium bicarbonate 650 milliGRAM(s) Oral three times a day  sodium chloride 0.9%. 1000 milliLiter(s) IV Continuous <Continuous>      PMHX/PSHX:  Hyperlipidemia    Atrial fibrillation    Hypertension    S/P hernia repair        Family history:  FH: emphysema (Mother)        ROS: As per HPI, otherwise 14-point ROS reviewed and negative.      PHYSICAL EXAM:   Vital Signs:  Vital Signs Last 24 Hrs  T(C): 36.8 (15 Ernesto 2022 04:14), Max: 36.8 (2022 21:15)  T(F): 98.2 (15 Ernesto 2022 04:14), Max: 98.2 (2022 21:15)  HR: 92 (15 Ernesto 2022 04:14) (92 - 99)  BP: 117/75 (15 Ernesto 2022 04:14) (101/67 - 118/78)  BP(mean): --  RR: 18 (15 Ernesto 2022 04:14) (18 - 18)  SpO2: 96% (15 Ernesto 2022 04:14) (96% - 99%)  Daily     Daily       22 @ 07:01  -  06-15-22 @ 07:00  --------------------------------------------------------  IN: 0 mL / OUT: 750 mL / NET: -750 mL      GENERAL:  No acute distress  HEENT:  Normocephalic/atraumatic, no scleral icterus  CHEST:  No accessory muscle use  HEART:  Regular rate and rhythm  ABDOMEN:  Soft, non-tender, non-distended  EXTREMITIES: No cyanosis, clubbing, +1 edema bilaterally  SKIN:  No rash  NEURO:  Alert and oriented x 3, no asterixis    LABS:                        11.2   16.32 )-----------( 219      ( 2022 07:11 )             34.5       06-14    136  |  103  |  71<H>  ----------------------------<  125<H>  4.8   |  17<L>  |  2.68<H>    Ca    8.9      2022 19:44    TPro  6.5  /  Alb  2.6<L>  /  TBili  1.8<H>  /  DBili  x   /  AST  114<H>  /  ALT  93<H>  /  AlkPhos  597<H>      LIVER FUNCTIONS - ( 2022 07:11 )  Alb: 2.6 g/dL / Pro: 6.5 g/dL / ALK PHOS: 597 U/L / ALT: 93 U/L / AST: 114 U/L / GGT: x           PT/INR - ( 2022 07:11 )   PT: 29.4 sec;   INR: 2.51 ratio         PTT - ( 2022 16:51 )  PTT:37.3 sec  Urinalysis Basic - ( 15 Ernesto 2022 07:35 )    Color: Yellow / Appearance: Slightly Turbid / S.017 / pH: x  Gluc: x / Ketone: Negative  / Bili: Negative / Urobili: 2 mg/dL   Blood: x / Protein: Trace / Nitrite: Negative   Leuk Esterase: Negative / RBC: 5 /hpf / WBC 0 /HPF   Sq Epi: x / Non Sq Epi: 0 /hpf / Bacteria: Negative                              11.2   16.32 )-----------( 219      ( 2022 07:11 )             34.5                         11.6   16.29 )-----------( 273      ( 2022 16:51 )             35.9       Imaging:

## 2022-06-15 NOTE — ADVANCED PRACTICE NURSE CONSULT - RECOMMEDATIONS
Will recommend the followin. B/l buttocks: continue to monitor, routine pericare with Shukri  2. continue to encourage mobility, T&P  3, seat cushion when OOB to chair  4. Boots for off-loading the heels when in bed  5. nutrition support as pt condition allows  Tx plan discussed with RN

## 2022-06-15 NOTE — PROGRESS NOTE ADULT - SUBJECTIVE AND OBJECTIVE BOX
Patient is a 76y old  Male who presents with a chief complaint of ROBERT and elevated liver tests (15 Ernesto 2022 13:58)      SUBJECTIVE / OVERNIGHT EVENTS:    Events noted.  CONSTITUTIONAL: No fever,  or fatigue  RESPIRATORY: No cough, wheezing,  No shortness of breath  CARDIOVASCULAR: No chest pain, palpitations, dizziness, or leg swelling  GASTROINTESTINAL: No abdominal or epigastric pain.      MEDICATIONS  (STANDING):  albumin human 25% IVPB 100 milliLiter(s) IV Intermittent every 8 hours  amLODIPine   Tablet 10 milliGRAM(s) Oral daily  chlorhexidine 2% Cloths 1 Application(s) Topical <User Schedule>  clotrimazole 1% Cream 1 Application(s) Topical two times a day  finasteride 5 milliGRAM(s) Oral daily  pantoprazole    Tablet 40 milliGRAM(s) Oral before breakfast  phytonadione  IVPB 5 milliGRAM(s) IV Intermittent daily  sodium bicarbonate 650 milliGRAM(s) Oral three times a day    MEDICATIONS  (PRN):  acetaminophen     Tablet .. 650 milliGRAM(s) Oral every 8 hours PRN Temp greater or equal to 38C (100.4F), Mild Pain (1 - 3)  melatonin 3 milliGRAM(s) Oral at bedtime PRN Insomnia  ondansetron Injectable 4 milliGRAM(s) IV Push every 8 hours PRN Nausea and/or Vomiting        CAPILLARY BLOOD GLUCOSE        I&O's Summary    2022 07:  -  15 Ernesto 2022 07:00  --------------------------------------------------------  IN: 0 mL / OUT: 750 mL / NET: -750 mL    15 Ernesto 2022 07:01  -  2022 01:20  --------------------------------------------------------  IN: 1570 mL / OUT: 650 mL / NET: 920 mL        T(C): 36.4 (06-15-22 @ 21:41), Max: 36.8 (06-15-22 @ 04:14)  HR: 87 (06-15-22 @ 21:41) (87 - 96)  BP: 126/69 (06-15-22 @ 21:41) (117/75 - 129/81)  RR: 16 (06-15-22 @ 21:41) (16 - 18)  SpO2: 97% (06-15-22 @ 21:41) (96% - 97%)    PHYSICAL EXAM:    NECK: Supple, No JVD  CHEST/LUNG: Clear to auscultation bilaterally; No wheezing.  HEART: Regular rate and rhythm; No murmurs, rubs, or gallops  ABDOMEN: Soft, Nontender, Nondistended; Bowel sounds present  EXTREMITIES:   No edema  NEUROLOGY: AAO X 3      LABS:                        11.2   16.32 )-----------( 219      ( 2022 07:11 )             34.5         136  |  103  |  71<H>  ----------------------------<  125<H>  4.8   |  17<L>  |  2.68<H>    Ca    8.9      2022 19:44    TPro  6.5  /  Alb  2.6<L>  /  TBili  1.8<H>  /  DBili  x   /  AST  114<H>  /  ALT  93<H>  /  AlkPhos  597<H>      PT/INR - ( 2022 07:11 )   PT: 29.4 sec;   INR: 2.51 ratio               Urinalysis Basic - ( 15 Ernesto 2022 07:35 )    Color: Yellow / Appearance: Slightly Turbid / S.017 / pH: x  Gluc: x / Ketone: Negative  / Bili: Negative / Urobili: 2 mg/dL   Blood: x / Protein: Trace / Nitrite: Negative   Leuk Esterase: Negative / RBC: 5 /hpf / WBC 0 /HPF   Sq Epi: x / Non Sq Epi: 0 /hpf / Bacteria: Negative      CAPILLARY BLOOD GLUCOSE            RADIOLOGY & ADDITIONAL TESTS:    Imaging Personally Reviewed:    Consultant(s) Notes Reviewed:      Care Discussed with Consultants/Other Providers:    Matheus Clarke MD, CMD, FACP    [address] [address]  Office Tel: [phone]  Cell: [phone]

## 2022-06-16 NOTE — DIETITIAN INITIAL EVALUATION ADULT - ADD RECOMMEND
1. Will continue to monitor PO intake, weight, labs, skin, GI status, and diet as able. 2. Encourage PO intake and honor food preferences. 3. Recommend Nephro-Chay if medically feasible to optimize nutrient intake in setting of cirrhosis and pressure ulcer healing. 4. Provided recommendations to optimize PO and protein intake - made aware RD remains available.

## 2022-06-16 NOTE — DIETITIAN INITIAL EVALUATION ADULT - ORAL INTAKE PTA/DIET HISTORY
Pt reports decreased appetite with fair PO intake x ~1 week PTA due to depression. Confirms NKFA. Pt reports not following any type of diet or restriction at home. Reports taking Multivitamin and B Complex PTA; denies drinking any nutritional supplement.

## 2022-06-16 NOTE — PROGRESS NOTE ADULT - SUBJECTIVE AND OBJECTIVE BOX
Patient is a 76y old  Male who presents with a chief complaint of ROBERT and elevated liver tests (15 Ernesto 2022 13:58)      SUBJECTIVE / OVERNIGHT EVENTS:    Events noted.  CONSTITUTIONAL: No fever,  or fatigue  RESPIRATORY: No cough, wheezing,  No shortness of breath  CARDIOVASCULAR: No chest pain, palpitations, dizziness, or leg swelling  GASTROINTESTINAL: No abdominal or epigastric pain.      MEDICATIONS  (STANDING):  albumin human 25% IVPB 100 milliLiter(s) IV Intermittent every 8 hours  amLODIPine   Tablet 10 milliGRAM(s) Oral daily  chlorhexidine 2% Cloths 1 Application(s) Topical <User Schedule>  clotrimazole 1% Cream 1 Application(s) Topical two times a day  finasteride 5 milliGRAM(s) Oral daily  pantoprazole    Tablet 40 milliGRAM(s) Oral before breakfast  phytonadione  IVPB 5 milliGRAM(s) IV Intermittent daily  sodium bicarbonate 650 milliGRAM(s) Oral three times a day    MEDICATIONS  (PRN):  acetaminophen     Tablet .. 650 milliGRAM(s) Oral every 8 hours PRN Temp greater or equal to 38C (100.4F), Mild Pain (1 - 3)  melatonin 3 milliGRAM(s) Oral at bedtime PRN Insomnia  ondansetron Injectable 4 milliGRAM(s) IV Push every 8 hours PRN Nausea and/or Vomiting        CAPILLARY BLOOD GLUCOSE        I&O's Summary    2022 07:  -  15 Ernesto 2022 07:00  --------------------------------------------------------  IN: 0 mL / OUT: 750 mL / NET: -750 mL    15 Ernesto 2022 07:01  -  2022 01:20  --------------------------------------------------------  IN: 1570 mL / OUT: 650 mL / NET: 920 mL        T(C): 36.4 (06-15-22 @ 21:41), Max: 36.8 (06-15-22 @ 04:14)  HR: 87 (06-15-22 @ 21:41) (87 - 96)  BP: 126/69 (06-15-22 @ 21:41) (117/75 - 129/81)  RR: 16 (06-15-22 @ 21:41) (16 - 18)  SpO2: 97% (06-15-22 @ 21:41) (96% - 97%)    PHYSICAL EXAM:    NECK: Supple, No JVD  CHEST/LUNG: Clear to auscultation bilaterally; No wheezing.  HEART: Regular rate and rhythm; No murmurs, rubs, or gallops  ABDOMEN: Soft, Nontender, Nondistended; Bowel sounds present  EXTREMITIES:   No edema  NEUROLOGY: AAO X 3      LABS:                        11.2   16.32 )-----------( 219      ( 2022 07:11 )             34.5         136  |  103  |  71<H>  ----------------------------<  125<H>  4.8   |  17<L>  |  2.68<H>    Ca    8.9      2022 19:44    TPro  6.5  /  Alb  2.6<L>  /  TBili  1.8<H>  /  DBili  x   /  AST  114<H>  /  ALT  93<H>  /  AlkPhos  597<H>      PT/INR - ( 2022 07:11 )   PT: 29.4 sec;   INR: 2.51 ratio               Urinalysis Basic - ( 15 Ernesto 2022 07:35 )    Color: Yellow / Appearance: Slightly Turbid / S.017 / pH: x  Gluc: x / Ketone: Negative  / Bili: Negative / Urobili: 2 mg/dL   Blood: x / Protein: Trace / Nitrite: Negative   Leuk Esterase: Negative / RBC: 5 /hpf / WBC 0 /HPF   Sq Epi: x / Non Sq Epi: 0 /hpf / Bacteria: Negative      CAPILLARY BLOOD GLUCOSE            RADIOLOGY & ADDITIONAL TESTS:    Imaging Personally Reviewed:    Consultant(s) Notes Reviewed:      Care Discussed with Consultants/Other Providers:    Matheus Clarke MD, CMD, FACP    [address] [address]  Office Tel: [phone]  Cell: [phone]

## 2022-06-16 NOTE — DIETITIAN INITIAL EVALUATION ADULT - PHYSCIAL ASSESSMENT
pounds. Performed nutrition focused physical exam with pt's consent and noted moderate muscle loss in temporales likely due to older age./well nourished

## 2022-06-16 NOTE — PROGRESS NOTE ADULT - SUBJECTIVE AND OBJECTIVE BOX
Patient seen and examined  no complaints    No Known Allergies    Hospital Medications:   MEDICATIONS  (STANDING):  albumin human 25% IVPB 100 milliLiter(s) IV Intermittent every 8 hours  amLODIPine   Tablet 10 milliGRAM(s) Oral daily  chlorhexidine 2% Cloths 1 Application(s) Topical <User Schedule>  clotrimazole 1% Cream 1 Application(s) Topical two times a day  finasteride 5 milliGRAM(s) Oral daily  pantoprazole    Tablet 40 milliGRAM(s) Oral before breakfast  phytonadione  IVPB 5 milliGRAM(s) IV Intermittent daily  sodium bicarbonate 650 milliGRAM(s) Oral three times a day        VITALS:  T(F): 97.8 (22 @ 13:27), Max: 97.8 (22 @ 05:35)  HR: 95 (22 @ 13:27)  BP: 132/76 (22 @ 13:27)  RR: 18 (22 @ 13:27)  SpO2: 96% (22 @ 13:27)  Wt(kg): --    06-15 @ 07:01  -   @ 07:00  --------------------------------------------------------  IN: 1570 mL / OUT: 1050 mL / NET: 520 mL        PHYSICAL EXAM:  Constitutional: NAD  HEENT: anicteric sclera, oropharynx clear, MMM  Neck: No JVD  Respiratory: b/l rhonchi  Cardiovascular: S1, S2, RRR  Gastrointestinal: BS+, soft, NT/ND  Extremities: +peripheral edema  Neurological: A/O x 3, no focal deficits  Psychiatric: Normal mood, normal affect  : No CVA tenderness. No fisher.   Skin: No rashes    LABS:      137  |  105  |  58<H>  ----------------------------<  111<H>  4.3   |  20<L>  |  1.97<H>    Ca    9.0      2022 07:43    TPro  6.6  /  Alb  3.2<L>  /  TBili  2.2<H>  /  DBili      /  AST  91<H>  /  ALT  74<H>  /  AlkPhos  657<H>  16    Creatinine Trend: 1.97 <--, 2.68 <--, 3.16 <--, 3.12 <--, 3.37 <--, 3.62 <--                        10.9   12.26 )-----------( 239      ( 2022 07:42 )             33.3     Urine Studies:  Urinalysis Basic - ( 15 Ernesto 2022 07:35 )    Color: Yellow / Appearance: Slightly Turbid / S.017 / pH:   Gluc:  / Ketone: Negative  / Bili: Negative / Urobili: 2 mg/dL   Blood:  / Protein: Trace / Nitrite: Negative   Leuk Esterase: Negative / RBC: 5 /hpf / WBC 0 /HPF   Sq Epi:  / Non Sq Epi: 0 /hpf / Bacteria: Negative      Potassium, Random Urine: 21 mmol/L (06-15 @ 07:35)  Osmolality, Random Urine: 569 mos/kg (06-15 @ 07:35)  Sodium, Random Urine: 72 mmol/L (06-15 @ 07:35)  Chloride, Random Urine: 66 mmol/L (06-15 @ 07:35)  Creatinine, Random Urine: 81 mg/dL (06-15 @ 07:35)  Protein/Creatinine Ratio Calculation: 0.2 Ratio (06-15 @ 07:35)  Creatinine, Random Urine: 91 mg/dL ( @ 08:58)  Protein/Creatinine Ratio Calculation: 0.2 Ratio ( @ 08:58)  Creatinine, Random Urine: 91 mg/dL ( @ 08:58)    RADIOLOGY & ADDITIONAL STUDIES:

## 2022-06-16 NOTE — DIETITIAN INITIAL EVALUATION ADULT - PERTINENT MEDS FT
MEDICATIONS  (STANDING):  albumin human 25% IVPB 100 milliLiter(s) IV Intermittent every 8 hours  amLODIPine   Tablet 10 milliGRAM(s) Oral daily  chlorhexidine 2% Cloths 1 Application(s) Topical <User Schedule>  clotrimazole 1% Cream 1 Application(s) Topical two times a day  finasteride 5 milliGRAM(s) Oral daily  pantoprazole    Tablet 40 milliGRAM(s) Oral before breakfast  phytonadione  IVPB 5 milliGRAM(s) IV Intermittent daily  sodium bicarbonate 650 milliGRAM(s) Oral three times a day    MEDICATIONS  (PRN):  acetaminophen     Tablet .. 650 milliGRAM(s) Oral every 8 hours PRN Temp greater or equal to 38C (100.4F), Mild Pain (1 - 3)  melatonin 3 milliGRAM(s) Oral at bedtime PRN Insomnia  ondansetron Injectable 4 milliGRAM(s) IV Push every 8 hours PRN Nausea and/or Vomiting

## 2022-06-16 NOTE — CONSULT NOTE ADULT - ASSESSMENT
Interventional Radiology    Evaluate for Procedure: diagnostic paracentesis    HPI: 76y Male with cirrhosis of unknown etiology. Hepatology is requesting diagnostic paracentesis.     Allergies:   Medications (Abx/Cardiac/Anticoagulation/Blood Products)  amLODIPine   Tablet: 10 milliGRAM(s) Oral (06-16 @ 05:07)    Data:    T(C): 36.6  HR: 77  BP: 154/83  RR: 18  SpO2: 96%    -WBC 16.32 / HgB 11.2 / Hct 34.5 / Plt 219  -Na 136 / Cl 103 / BUN 71 / Glucose 125  -K 4.8 / CO2 17 / Cr 2.68  -ALT -- / Alk Phos -- / T.Bili --  -INR 2.51 / PTT --      Radiology: Ultrasound abdomen reviewed    Assessment/Plan:   76y Male with cirrhosis of unknown etiology. Hepatology is requesting diagnostic paracentesis.   -- IR will plan to perform   -- NPO at midnight on   -- hold a.m. anticoagulation on  -- please place IR procedure request order under DrMyrna     --  Marcelino Alejandro DO/JACKIE  Interventional Radiology Resident (PGY-3)   IR Pager [phone] Interventional Radiology    Evaluate for Procedure: diagnostic paracentesis    HPI: 76y Male with cirrhosis of unknown etiology. Hepatology is requesting diagnostic paracentesis.     Allergies:   Medications (Abx/Cardiac/Anticoagulation/Blood Products)  amLODIPine   Tablet: 10 milliGRAM(s) Oral (06-16 @ 05:07)    Data:    T(C): 36.6  HR: 77  BP: 154/83  RR: 18  SpO2: 96%    -WBC 16.32 / HgB 11.2 / Hct 34.5 / Plt 219  -Na 136 / Cl 103 / BUN 71 / Glucose 125  -K 4.8 / CO2 17 / Cr 2.68  -ALT -- / Alk Phos -- / T.Bili --  -INR 2.51 / PTT --      Radiology: Ultrasound abdomen reviewed    Assessment/Plan:   76y Male with cirrhosis of unknown etiology. Hepatology is requesting diagnostic paracentesis.   -- IR will plan to perform diagnostic paracentesis tomorrow 6/17  -- please place IR procedure request order under Claude NP     --  Marcelino Alejandro DO/JACKIE  Interventional Radiology Resident (PGY-3)   IR Pager [phone]

## 2022-06-16 NOTE — DIETITIAN INITIAL EVALUATION ADULT - REASON INDICATOR FOR ASSESSMENT
Nutrition consult received for pressure ulcer >2.  Information obtained from: medical record, pt, and wife at bedside.

## 2022-06-16 NOTE — PROGRESS NOTE ADULT - ASSESSMENT
75yo M with PMH of Afib (on Eliquis), HTN, HLD who presents with worsening dyspnea. Hepatology consulted for cirrhosis and elevated liver tests.    # Elevated liver tests - Noted infiltrative disease on MRI (w/o contrast) - ddx includes HCC vs. metastatic disease  # ROBERT - on CKD, unclear baseline, urine lytes not consistent with HRS/pre-renal  # Cirrhosis - MELD 31. Unclear etiology, suspect ARTHUR cirrhosis vs. cardiac vs. other.  Ascites - minimal  Varices - unclear  HCC - unknown  PSE - non on PE  # Afib - on eliquis    Recommendations:  - Check AFP, CEA, CA 19-9  - MRI abdomen with contrast to evaluate for HCC  - c/w albumin 25% 100cc q8h  - f/u blood cultures x 2  - f/u NIYAH, ASMA, AMA, hepatitis panel (HBV, HCV, HAV IgM), EBV PCR, CMV PCR, HSV PCR  - Get outpatient records re: baseline labs  - Give vitamin K IV x 3d  - hold statin  - Not a transplant candidate given age    Thank you for involving us in the care of this patient. Please reach out if any further questions.    Theo Mclain, PGY-5  Hepatology Fellow    Available on [employer] Teams  Pager [phone] ([facility]) or [phone] ([facility])  After 5PM/Weekends, please contact the on-call GI fellow: [phone]  Available through [employer] Teams   75yo M with PMH of Afib (on Eliquis), HTN, HLD who presents with worsening dyspnea. Hepatology consulted for cirrhosis and elevated liver tests.    # Elevated liver tests - Noted infiltrative disease on MRI (w/o contrast) - ddx includes HCC vs. metastatic disease  # ROBERT - on CKD, unclear baseline, urine lytes not consistent with HRS/pre-renal  # Cirrhosis - MELD 31. Unclear etiology, suspect ARTHUR cirrhosis vs. cardiac vs. other.  Ascites - minimal  Varices - unclear  HCC - unknown  PSE - non on PE  # Afib - on eliquis    Recommendations:  - Please consult IR for liver biopsy (suspect cholangiocarcinoma)  - MRI abdomen with contrast  - c/w albumin 25% 100cc q8h  - f/u blood cultures x 2  - f/u NIYAH, ASMA, AMA, hepatitis panel (HBV, HCV, HAV IgM), EBV PCR, CMV PCR, HSV PCR  - Get outpatient records re: baseline labs  - Give vitamin K IV x 3d  - hold statin  - Not a transplant candidate given age    Thank you for involving us in the care of this patient. Please reach out if any further questions.    Theo Mclain, PGY-5  Hepatology Fellow    Available on [employer] Teams  Pager [phone] ([facility]) or [phone] ([facility])  After 5PM/Weekends, please contact the on-call GI fellow: [phone]  Available through [employer] Teams

## 2022-06-16 NOTE — PROGRESS NOTE ADULT - ASSESSMENT
76 year old male w/pmh significant for Afib on Eliquis , HTN ,  HLD , presenting for worsening shortness of breath found to have ROBERT on ? CKD and hyperkalemia with metabolic acidosis      1) ROBERT on ?CKD  ddx includes pre renal 2/2 dec po intake and hctz vs ATN from arb/ hemodynamic changes vs ckd progression  pt not aware of underlying kidney disease--> may have component of Hypertensive Nephropathy  ua and urine lytes reviewed  Renal US --> right kidney 9.8cm and left kidney 9.9cm--> no hydro  keep off ARB and HCTZ  s/p iV nacl @ 75cc  now on Albumin q8h  avoid nephrotoxins  trend bmp q12h       2) Hyperkalemia  in setting of robert/ckd/hctz/arb  s/p insulin/albuterol and nacl bolus  s/p lokelma  improved  renal diet  trend k q daily    3) Metabolic acidosis-  insetting of robert/ckd  c/w nabicarb 650mg po tid  trend bicarb        Dr Romero  [phone]

## 2022-06-16 NOTE — DIETITIAN INITIAL EVALUATION ADULT - SIGNS/SYMPTOMS
Pt reports decreased appetite with fair PO intake x 1 week PTA and at lunch yesterday Pt with pressure ulcer as above and with cirrhosis

## 2022-06-16 NOTE — DIETITIAN INITIAL EVALUATION ADULT - REASON FOR ADMISSION
Pt 75 y/o M with PMH as per chart: significant for Afib on Eliquis, HTN,  HLD, presented for worsening shortness of breath over the past several weeks, found with ROBERT, elevated liver tests, cirrhosis, minimal ascites, suspect cholangiocarcinoma, plan for diagnostic paracentesis tomorrow (06/17).

## 2022-06-16 NOTE — DIETITIAN INITIAL EVALUATION ADULT - PERTINENT LABORATORY DATA
(06/16) BUN 58, Cr 1.97,     TPro  6.6  /  Alb  3.2<L>  /  TBili  2.2<H>  /  DBili  x   /  AST  91<H>  /  ALT  74<H>  /  AlkPhos  657<H>  06-16

## 2022-06-16 NOTE — PROGRESS NOTE ADULT - SUBJECTIVE AND OBJECTIVE BOX
Interval Events:   - Awaiting MRI with contrast and paracentesis  - No acute events overnight    Allergies:  No Known Allergies      Hospital Medications:  acetaminophen     Tablet .. 650 milliGRAM(s) Oral every 8 hours PRN  albumin human 25% IVPB 100 milliLiter(s) IV Intermittent every 8 hours  amLODIPine   Tablet 10 milliGRAM(s) Oral daily  chlorhexidine 2% Cloths 1 Application(s) Topical <User Schedule>  clotrimazole 1% Cream 1 Application(s) Topical two times a day  finasteride 5 milliGRAM(s) Oral daily  melatonin 3 milliGRAM(s) Oral at bedtime PRN  ondansetron Injectable 4 milliGRAM(s) IV Push every 8 hours PRN  pantoprazole    Tablet 40 milliGRAM(s) Oral before breakfast  phytonadione  IVPB 5 milliGRAM(s) IV Intermittent daily  sodium bicarbonate 650 milliGRAM(s) Oral three times a day      PMHX/PSHX:  Hyperlipidemia    Atrial fibrillation    Hypertension    S/P hernia repair        Family history:  FH: emphysema (Mother)        ROS: As per HPI, otherwise 14-point ROS reviewed and negative.      PHYSICAL EXAM:   Vital Signs:  Vital Signs Last 24 Hrs  T(C): 36.6 (2022 05:35), Max: 36.6 (2022 05:35)  T(F): 97.8 (2022 05:35), Max: 97.8 (2022 05:35)  HR: 77 (2022 05:35) (77 - 96)  BP: 154/83 (2022 05:35) (126/69 - 154/83)  BP(mean): --  RR: 18 (2022 05:35) (16 - 18)  SpO2: 96% (2022 05:35) (96% - 97%)  Daily     Daily       06-15-22 @ 07:01  -  22 @ 07:00  --------------------------------------------------------  IN: 1570 mL / OUT: 1050 mL / NET: 520 mL      GENERAL:  No acute distress  HEENT:  Normocephalic/atraumatic, no scleral icterus  CHEST:  No accessory muscle use  HEART:  Regular rate and rhythm  ABDOMEN:  Soft, non-tender, non-distended  EXTREMITIES: No cyanosis, clubbing, +1 edema bilaterally  SKIN:  No rash  NEURO:  Alert and oriented x 3, no asterixis      LABS:                        10.9   12.26 )-----------( 239      ( 2022 07:42 )             33.3     Mean Cell Volume: 92.2 fl (- @ 07:42)        137  |  105  |  58<H>  ----------------------------<  111<H>  4.3   |  20<L>  |  1.97<H>    Ca    9.0      2022 07:43    TPro  6.6  /  Alb  3.2<L>  /  TBili  2.2<H>  /  DBili  x   /  AST  91<H>  /  ALT  74<H>  /  AlkPhos  657<H>      LIVER FUNCTIONS - ( 2022 07:43 )  Alb: 3.2 g/dL / Pro: 6.6 g/dL / ALK PHOS: 657 U/L / ALT: 74 U/L / AST: 91 U/L / GGT: x           PT/INR - ( 2022 07:43 )   PT: 19.3 sec;   INR: 1.66 ratio           Urinalysis Basic - ( 15 Ernesto 2022 07:35 )    Color: Yellow / Appearance: Slightly Turbid / S.017 / pH: x  Gluc: x / Ketone: Negative  / Bili: Negative / Urobili: 2 mg/dL   Blood: x / Protein: Trace / Nitrite: Negative   Leuk Esterase: Negative / RBC: 5 /hpf / WBC 0 /HPF   Sq Epi: x / Non Sq Epi: 0 /hpf / Bacteria: Negative                              10.9   12.26 )-----------( 239      ( 2022 07:42 )             33.3                         11.2   16.32 )-----------( 219      ( 2022 07:11 )             34.5                         11.6   16.29 )-----------( 273      ( 2022 16:51 )             35.9       Imaging:

## 2022-06-16 NOTE — DIETITIAN INITIAL EVALUATION ADULT - ENERGY INTAKE
Fair (50-75%) Pt reports in general fair appetite and PO intake in house, but states not eating at lunch yesterday due to receiving "bad news of possible cancer". Wife states after, pt consumed ~75% of dinner. Pt agreed to try Ensure Enlive. Denies difficulty chewing/swallowing. Pt denies nausea, vomiting, diarrhea, or constipation, last BM yesterday (06/16).

## 2022-06-16 NOTE — DIETITIAN INITIAL EVALUATION ADULT - OTHER INFO
Pt reports hx of intentional, gradual, and healthy weight loss of 85 pounds x 2 years from 283 to 198 pounds; states weighing 198 pounds for approximately 6 months. Weight as per flow sheets (06/13) 199 pounds.     Provided recommendations to optimize PO and protein intake, recommended small frequent meals by ordering nutrient-dense snacks and leaving non-perishable food away from tray for later consumption during the day or between meals, to start with protein, and sips of supplement throughout the day; reviewed foods with protein and menu order procedures in hospital. Pt and wife deny having further questions/concerns about diet and nutrition - made aware RD remains available.

## 2022-06-17 NOTE — PROGRESS NOTE ADULT - SUBJECTIVE AND OBJECTIVE BOX
Patient seen and examined  no complaints  No Known Allergies    Hospital Medications:   MEDICATIONS  (STANDING):  albumin human 25% IVPB 100 milliLiter(s) IV Intermittent every 8 hours  amLODIPine   Tablet 10 milliGRAM(s) Oral daily  chlorhexidine 2% Cloths 1 Application(s) Topical <User Schedule>  clotrimazole 1% Cream 1 Application(s) Topical two times a day  finasteride 5 milliGRAM(s) Oral daily  pantoprazole    Tablet 40 milliGRAM(s) Oral before breakfast  phytonadione  IVPB 5 milliGRAM(s) IV Intermittent daily  sodium bicarbonate 650 milliGRAM(s) Oral three times a day      VITALS:  T(F): 97.9 (22 @ 05:27), Max: 97.9 (22 @ 21:07)  HR: 96 (22 @ 09:30)  BP: 138/84 (22 @ 09:30)  RR: 17 (22 @ 05:27)  SpO2: 96% (22 @ 09:30)  Wt(kg): --     @ 07:01  -   @ 07:00  --------------------------------------------------------  IN: 720 mL / OUT: 1225 mL / NET: -505 mL          PHYSICAL EXAM:  Constitutional: NAD  HEENT: anicteric sclera, oropharynx clear, MMM  Neck: No JVD  Respiratory: b/l rhonchi  Cardiovascular: S1, S2, RRR  Gastrointestinal: BS+, soft, NT/ND  Extremities: +peripheral edema  Neurological: A/O x 3, no focal deficits  Psychiatric: Normal mood, normal affect  : No CVA tenderness. No fisher.   Skin: No rashes    LABS:      139  |  105  |  52<H>  ----------------------------<  130<H>  4.8   |  20<L>  |  1.75<H>    Ca    9.4      2022 06:18    TPro  6.9  /  Alb  4.0  /  TBili  2.8<H>  /  DBili      /  AST  119<H>  /  ALT  79<H>  /  AlkPhos  646<H>      Creatinine Trend: 1.75 <--, 1.97 <--, 2.68 <--, 3.16 <--, 3.12 <--, 3.37 <--, 3.62 <--                        11.2   13.24 )-----------( 224      ( 2022 06:18 )             33.8     Urine Studies:  Urinalysis Basic - ( 15 Ernesto 2022 07:35 )    Color: Yellow / Appearance: Slightly Turbid / S.017 / pH:   Gluc:  / Ketone: Negative  / Bili: Negative / Urobili: 2 mg/dL   Blood:  / Protein: Trace / Nitrite: Negative   Leuk Esterase: Negative / RBC: 5 /hpf / WBC 0 /HPF   Sq Epi:  / Non Sq Epi: 0 /hpf / Bacteria: Negative      Potassium, Random Urine: 21 mmol/L (06-15 @ 07:35)  Osmolality, Random Urine: 569 mos/kg (06-15 @ 07:35)  Sodium, Random Urine: 72 mmol/L (06-15 @ 07:35)  Chloride, Random Urine: 66 mmol/L (06-15 @ 07:35)  Creatinine, Random Urine: 81 mg/dL (06-15 @ 07:35)  Protein/Creatinine Ratio Calculation: 0.2 Ratio (06-15 @ 07:35)  Creatinine, Random Urine: 91 mg/dL ( @ 08:58)  Protein/Creatinine Ratio Calculation: 0.2 Ratio ( @ 08:58)  Creatinine, Random Urine: 91 mg/dL ( @ 08:58)    RADIOLOGY & ADDITIONAL STUDIES:

## 2022-06-17 NOTE — PROGRESS NOTE ADULT - SUBJECTIVE AND OBJECTIVE BOX
Patient is a 76 year old male w/pmh significant for Afib on Eliquis , HTN ,  HLD , admitted for worsening shortness of breath over the several weeks PTA on 6/14/22  Patient reported previous unlimited exercise tolerance , however, over the past several weeks  he became increasingly dyspneic with exertion , and now  short of breath with minimal activity. Patient reported no chest pain , no palpitations ,  he had some lower extremity edema , no orthopnea and no PND. He reported no cough or fever. Patient was evaluated by his PMD and bloodwork  showed elevated liver tests and decreased renal function. Patient also reported being evaluated by cardiology and echocardiogram reportedly showed no major abnormalities.  Repeat lab  testing showed worsening of renal function and patient was referred to the hospital for further evaluation. Patient reports no dysuria. He reports no alcohol use.   Seen for MRCP findings suggestive of infiltrative neoplasm  Seen by renal and hepatology    PAST MEDICAL & SURGICAL HISTORY:  Hyperlipidemia      Atrial fibrillation      Hypertension      S/P hernia repair        Allergies    No Known Allergies    Intolerances      Social History:   , lives with wife   former smoker 2ppd x many years , quit about 25 years ago   no alcohol use (14 Jun 2022 03:05)    Medications:  acetaminophen     Tablet .. 650 milliGRAM(s) Oral every 8 hours PRN Temp greater or equal to 38C (100.4F), Mild Pain (1 - 3)  amLODIPine   Tablet 10 milliGRAM(s) Oral daily  chlorhexidine 2% Cloths 1 Application(s) Topical <User Schedule>  clotrimazole 1% Cream 1 Application(s) Topical two times a day  finasteride 5 milliGRAM(s) Oral daily  melatonin 3 milliGRAM(s) Oral at bedtime PRN Insomnia  ondansetron Injectable 4 milliGRAM(s) IV Push every 8 hours PRN Nausea and/or Vomiting  pantoprazole    Tablet 40 milliGRAM(s) Oral before breakfast  phytonadione  IVPB 5 milliGRAM(s) IV Intermittent daily  sodium bicarbonate 650 milliGRAM(s) Oral three times a day    Labs:  CBC Full  -  ( 17 Jun 2022 06:18 )  WBC Count : 13.24 K/uL  RBC Count : 3.70 M/uL  Hemoglobin : 11.2 g/dL  Hematocrit : 33.8 %  Platelet Count - Automated : 224 K/uL  Mean Cell Volume : 91.4 fl  Mean Cell Hemoglobin : 30.3 pg  Mean Cell Hemoglobin Concentration : 33.1 gm/dL  Auto Neutrophil # : x  Auto Lymphocyte # : x  Auto Monocyte # : x  Auto Eosinophil # : x  Auto Basophil # : x  Auto Neutrophil % : x  Auto Lymphocyte % : x  Auto Monocyte % : x  Auto Eosinophil % : x  Auto Basophil % : x    06-17    139  |  105  |  52<H>  ----------------------------<  130<H>  4.8   |  20<L>  |  1.75<H>    Ca    9.4      17 Jun 2022 06:18    TPro  6.9  /  Alb  4.0  /  TBili  2.8<H>  /  DBili  x   /  AST  119<H>  /  ALT  79<H>  /  AlkPhos  646<H>  06-17  Prothrombin Time and INR, Plasma in AM (06.16.22 @ 07:43) Prothrombin Time, Plasma: 19.3: Effective February 23,2022, the reference range has changed. sec   INR: 1.67:   Prothrombin Time and INR, Plasma (06.14.22 @ 07:11)   Prothrombin Time, Plasma: 29.4: Effective February 23,2022, the reference range has changed. sec   INR: 2.51::   Cancer Antigen, GI Ca 19-9: 1602: Test Repeated   Carcinoembryonic Antigen: 4.8  Alpha Fetoprotein - Tumor Marker: 6.2:     Radiology:             ROS:  Patient comfortable without distress  No SOB or chest pain  No palpitation  No abdominal pain, diarrhaea or constipation  No weakness of extremities  No skin changes or swelling of legs  Rest of the comprehensive ROS was negative  Vital Signs Last 24 Hrs  T(C): 36.6 (17 Jun 2022 14:11), Max: 36.6 (16 Jun 2022 21:07)  T(F): 97.8 (17 Jun 2022 14:11), Max: 97.9 (16 Jun 2022 21:07)  HR: 104 (17 Jun 2022 14:11) (88 - 104)  BP: 138/76 (17 Jun 2022 14:11) (131/75 - 138/84)  BP(mean): --  RR: 18 (17 Jun 2022 14:11) (17 - 18)  SpO2: 96% (17 Jun 2022 14:11) (95% - 96%)    Physical exam:  Patient alert and oriented  No distress  CVS: S1, S2 regular or murmur  Chest: bilateral breath sound without rales  Abdomen: soft, not tender, no organomegaly or masses  CNS: No focal neuro deficit  Musculoskeletal:  Normal range of motion  Skin: No rash    Assessment and Plan: Patient is a 76 year old male w/pmh significant for Afib on Eliquis , HTN ,  HLD , admitted for worsening shortness of breath over the several weeks PTA on 6/14/22  Patient reported previous unlimited exercise tolerance , however, over the past several weeks  he became increasingly dyspneic with exertion , and now  short of breath with minimal activity. Patient reported no chest pain , no palpitations ,  he had some lower extremity edema , no orthopnea and no PND. He reported no cough or fever. Patient was evaluated by his PMD and bloodwork  showed elevated liver tests and decreased renal function. Patient also reported being evaluated by cardiology and echocardiogram reportedly showed no major abnormalities.  Repeat lab  testing showed worsening of renal function and patient was referred to the hospital for further evaluation. Patient reports no dysuria. He reports no alcohol use.   Seen for MRCP findings suggestive of infiltrative neoplasm  Seen by renal and hepatology    PAST MEDICAL & SURGICAL HISTORY:  Hyperlipidemia      Atrial fibrillation      Hypertension      S/P hernia repair        Allergies    No Known Allergies    Intolerances      Social History:   , lives with wife   former smoker 2ppd x many years , quit about 25 years ago   no alcohol use (14 Jun 2022 03:05)    Medications:  acetaminophen     Tablet .. 650 milliGRAM(s) Oral every 8 hours PRN Temp greater or equal to 38C (100.4F), Mild Pain (1 - 3)  amLODIPine   Tablet 10 milliGRAM(s) Oral daily  chlorhexidine 2% Cloths 1 Application(s) Topical <User Schedule>  clotrimazole 1% Cream 1 Application(s) Topical two times a day  finasteride 5 milliGRAM(s) Oral daily  melatonin 3 milliGRAM(s) Oral at bedtime PRN Insomnia  ondansetron Injectable 4 milliGRAM(s) IV Push every 8 hours PRN Nausea and/or Vomiting  pantoprazole    Tablet 40 milliGRAM(s) Oral before breakfast  phytonadione  IVPB 5 milliGRAM(s) IV Intermittent daily  sodium bicarbonate 650 milliGRAM(s) Oral three times a day    Labs:  CBC Full  -  ( 17 Jun 2022 06:18 )  WBC Count : 13.24 K/uL  RBC Count : 3.70 M/uL  Hemoglobin : 11.2 g/dL  Hematocrit : 33.8 %  Platelet Count - Automated : 224 K/uL  Mean Cell Volume : 91.4 fl  Mean Cell Hemoglobin : 30.3 pg  Mean Cell Hemoglobin Concentration : 33.1 gm/dL  Auto Neutrophil # : x  Auto Lymphocyte # : x  Auto Monocyte # : x  Auto Eosinophil # : x  Auto Basophil # : x  Auto Neutrophil % : x  Auto Lymphocyte % : x  Auto Monocyte % : x  Auto Eosinophil % : x  Auto Basophil % : x    06-17    139  |  105  |  52<H>  ----------------------------<  130<H>  4.8   |  20<L>  |  1.75<H>    Ca    9.4      17 Jun 2022 06:18    TPro  6.9  /  Alb  4.0  /  TBili  2.8<H>  /  DBili  x   /  AST  119<H>  /  ALT  79<H>  /  AlkPhos  646<H>  06-17  Prothrombin Time and INR, Plasma in AM (06.16.22 @ 07:43) Prothrombin Time, Plasma: 19.3: Effective February 23,2022, the reference range has changed. sec   INR: 1.67:   Prothrombin Time and INR, Plasma (06.14.22 @ 07:11)   Prothrombin Time, Plasma: 29.4: Effective February 23,2022, the reference range has changed. sec   INR: 2.51::   Cancer Antigen, GI Ca 19-9: 1602: Test Repeated   Carcinoembryonic Antigen: 4.8  Alpha Fetoprotein - Tumor Marker: 6.2:     Radiology:             ROS:  Patient comfortable without distress  No SOB or chest pain  No palpitation  No abdominal pain, diarrhaea or constipation  No weakness of extremities  No skin changes or swelling of legs  Rest of the comprehensive ROS was negative  Vital Signs Last 24 Hrs  T(C): 36.6 (17 Jun 2022 14:11), Max: 36.6 (16 Jun 2022 21:07)  T(F): 97.8 (17 Jun 2022 14:11), Max: 97.9 (16 Jun 2022 21:07)  HR: 104 (17 Jun 2022 14:11) (88 - 104)  BP: 138/76 (17 Jun 2022 14:11) (131/75 - 138/84)  BP(mean): --  RR: 18 (17 Jun 2022 14:11) (17 - 18)  SpO2: 96% (17 Jun 2022 14:11) (95% - 96%)    Physical exam:  Patient alert and oriented  No distress  CVS: S1, S2   Chest: bilateral breath sound without rales  Abdomen: soft, not tender, no organomegaly or masses  CNS: No focal neuro deficit  Musculoskeletal:  Normal range of motion  Skin: No rash    Assessment and Plan:

## 2022-06-17 NOTE — PHYSICAL THERAPY INITIAL EVALUATION ADULT - GAIT TRAINING, PT EVAL
GOAL: Pt will ambulate 200 feet w/independently, w/use of appropriate assistive device in 2 weeks. GOAL: Pt will ambulate 200 feet w/independently, w/use of appropriate assistive device in 4 weeks.

## 2022-06-17 NOTE — PRE PROCEDURE NOTE - PRE PROCEDURE EVALUATION
Interventional Radiology    HPI:76y Male with cirrhosis of unknown etiology. Hepatology is requesting diagnostic paracentesis.   NPO:    Allergies:   Medications (Abx/Cardiac/Anticoagulation/Blood Products)  amLODIPine   Tablet: 10 milliGRAM(s) Oral (06-17 @ 05:30)    Data:    T(C): 36.6  HR: 96  BP: 138/84  RR: 17  SpO2: 96%    Shortness of breath    FH: emphysema (Mother)    Handoff    MEWS Score    Hyperlipidemia    Atrial fibrillation    Hypertension    Shortness of breath    ROBERT (acute kidney injury)    Elevated liver function tests    Paroxysmal atrial fibrillation    HTN (hypertension)    Exertional dyspnea    S/P hernia repair    ABN BLOOD RESULTS    Acute renal failure    [email or web address]        Exam  General: No acute distress  Chest: Non labored breathing    -WBC 13.24 / HgB 11.2 / Hct 33.8 / Plt 224  -Na 139 / Cl 105 / BUN 52 / Glucose 130  -K 4.8 / CO2 20 / Cr 1.75  -ALT 79 / Alk Phos 646 / T.Bili 2.8  -INR1.67    Imaging:     Plan: 76y Male presents for Paracentesis.  -Risks/Benefits/alternatives explained with the patient and witnessed informed consent obtained.

## 2022-06-17 NOTE — PHYSICAL THERAPY INITIAL EVALUATION ADULT - PLANNED THERAPY INTERVENTIONS, PT EVAL
Goal Established:  Time Frame: 2 weeks   Goal: Patient will negotiate up and down 18 steps with rail independently./bed mobility training/gait training/transfer training

## 2022-06-17 NOTE — PHYSICAL THERAPY INITIAL EVALUATION ADULT - PERTINENT HX OF CURRENT PROBLEM, REHAB EVAL
76 M w/pmh significant for Afib on Eliquis , HTN ,  HLD , p/w HERNANDEZ x few weeks , found to have ROBERT on ? CKD and hyperkalemia with metabolic acidosis. Hepatology consulted for cirrhosis and elevated liver tests. Pending diagnostic paracentesis and liver biopsy. 76 M w/pmhx significant for Afib on Eliquis , HTN ,  HLD , p/w HERNANDEZ x few weeks , found to have ROBERT on ? CKD and hyperkalemia with metabolic acidosis. Hepatology consulted for cirrhosis and elevated liver tests. Pending diagnostic paracentesis and liver biopsy.

## 2022-06-17 NOTE — PROGRESS NOTE ADULT - ASSESSMENT
77yo M with PMH of Afib (on Eliquis), HTN, HLD admitted with worsening dyspnea. Seen for imaging suggestive of cirrhosis/infiltrative disease. Has elevated LFT' and ROBERT on ? CKD, seen by nephrology  Cirrhosis could be ETOH, ARTHUR, Cardiac etc, benitez being done  Hep profile negative, tumor markers except for Ca 19-9 normal (? colangioscarcinoma)  Diagnostic paracentesis done, follow results  MRI with contrast pending  Will need bx liver by IR depending on results of paracentesis.  Management plan after establishing the dx  PT had downward trend on follow up after dc Eliquis 75yo M with PMH of Afib (on Eliquis), HTN, HLD admitted with worsening dyspnea. Seen for imaging suggestive of cirrhosis/infiltrative disease. Has elevated LFT' and ROBERT on ? CKD, seen by nephrology  Cirrhosis could be ETOH, ARTHUR, Cardiac etc, benitez being done  Hep profile negative, tumor markers except for Ca 19-9 normal   Diagnostic paracentesis done, follow results  < from: MR Abdomen w/ IV Cont (06.17.22 @ 11:15) >  IMPRESSION:  Diffusely infiltrated masses in the liver which may represent metastatic   disease or multifocal HCC. Left portal vein is not visualized and may be   involved. 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 Soft   tissue density in the left peritoneal cavity may represent additional   areas of disease and can be better evaluated on CT.      < end of copied text >    Will need bx liver by IR depending on results of paracentesis.  Management plan after establishing the dx  PT had downward trend on follow up after dc Eliquis 75yo M with PMH of Afib (on Eliquis), HTN, HLD admitted with worsening dyspnea. Seen for imaging suggestive of cirrhosis/infiltrative disease. Has elevated LFT' and ROBERT on ? CKD, seen by nephrology  Cirrhosis could be ETOH, ARTHUR, Cardiac etc, benitez being done  Hep profile negative, tumor markers except for Ca 19-9 normal   Diagnostic paracentesis done, follow results  < from: MR Abdomen w/ IV Cont (06.17.22 @ 11:15) >  IMPRESSION:  Diffusely infiltrated masses in the liver which may represent metastatic   disease or multifocal HCC. Left portal vein is not visualized and may be   involved. 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 Soft   tissue density in the left peritoneal cavity may represent additional   areas of disease and can be better evaluated on CT.      < end of copied text >    May/likely need bx liver by IR depending on results of paracentesis.  Management plan after establishing the dx  PT had downward trend on follow up after dc Eliquis

## 2022-06-17 NOTE — PROGRESS NOTE ADULT - SUBJECTIVE AND OBJECTIVE BOX
Patient is a 76y old  Male who presents with a chief complaint of ROBERT and elevated liver tests (17 Jun 2022 16:19)      INTERVAL HPI/OVERNIGHT EVENTS:  T(C): 36.6 (06-17-22 @ 20:26), Max: 36.6 (06-17-22 @ 05:27)  HR: 100 (06-17-22 @ 20:26) (92 - 104)  BP: 128/79 (06-17-22 @ 20:26) (128/79 - 138/84)  RR: 19 (06-17-22 @ 20:26) (17 - 19)  SpO2: 95% (06-17-22 @ 20:26) (95% - 96%)  Wt(kg): --  I&O's Summary    16 Jun 2022 07:01  -  17 Jun 2022 07:00  --------------------------------------------------------  IN: 720 mL / OUT: 1225 mL / NET: -505 mL    17 Jun 2022 07:01  -  17 Jun 2022 23:32  --------------------------------------------------------  IN: 0 mL / OUT: 250 mL / NET: -250 mL        PAST MEDICAL & SURGICAL HISTORY:  Hyperlipidemia      Atrial fibrillation      Hypertension      S/P hernia repair          SOCIAL HISTORY  Alcohol:  Tobacco:  Illicit substance use:    FAMILY HISTORY:    REVIEW OF SYSTEMS:  CONSTITUTIONAL: No fever, weight loss, or fatigue  EYES: No eye pain, visual disturbances, or discharge  ENMT:  No difficulty hearing, tinnitus, vertigo; No sinus or throat pain  NECK: No pain or stiffness  RESPIRATORY: No cough, wheezing, chills or hemoptysis; No shortness of breath  CARDIOVASCULAR: No chest pain, palpitations, dizziness, or leg swelling  GASTROINTESTINAL: No abdominal or epigastric pain. No nausea, vomiting, or hematemesis; No diarrhea or constipation. No melena or hematochezia.  GENITOURINARY: No dysuria, frequency, hematuria, or incontinence  NEUROLOGICAL: No headaches, memory loss, loss of strength, numbness, or tremors  SKIN: No itching, burning, rashes, or lesions   LYMPH NODES: No enlarged glands  ENDOCRINE: No heat or cold intolerance; No hair loss  MUSCULOSKELETAL: No joint pain or swelling; No muscle, back, or extremity pain  PSYCHIATRIC: No depression, anxiety, mood swings, or difficulty sleeping  HEME/LYMPH: No easy bruising, or bleeding gums  ALLERY AND IMMUNOLOGIC: No hives or eczema    RADIOLOGY & ADDITIONAL TESTS:    Imaging Personally Reviewed:  [ ] YES  [ ] NO    Consultant(s) Notes Reviewed:  [ ] YES  [ ] NO    PHYSICAL EXAM:  GENERAL: NAD, well-groomed, well-developed  HEAD:  Atraumatic, Normocephalic  EYES: EOMI, PERRLA, conjunctiva and sclera clear  ENMT: No tonsillar erythema, exudates, or enlargement; Moist mucous membranes, Good dentition, No lesions  NECK: Supple, No JVD, Normal thyroid  NERVOUS SYSTEM:  Alert & Oriented X3, Good concentration; Motor Strength 5/5 B/L upper and lower extremities; DTRs 2+ intact and symmetric  CHEST/LUNG: Clear to percussion bilaterally; No rales, rhonchi, wheezing, or rubs  HEART: Regular rate and rhythm; No murmurs, rubs, or gallops  ABDOMEN: Soft, Nontender, Nondistended; Bowel sounds present  EXTREMITIES:  2+ Peripheral Pulses, No clubbing, cyanosis, or edema  LYMPH: No lymphadenopathy noted  SKIN: No rashes or lesions    LABS:                        11.2   13.24 )-----------( 224      ( 17 Jun 2022 06:18 )             33.8     06-17    139  |  105  |  52<H>  ----------------------------<  130<H>  4.8   |  20<L>  |  1.75<H>    Ca    9.4      17 Jun 2022 06:18    TPro  6.9  /  Alb  4.0  /  TBili  2.8<H>  /  DBili  x   /  AST  119<H>  /  ALT  79<H>  /  AlkPhos  646<H>  06-17    PT/INR - ( 17 Jun 2022 06:18 )   PT: 19.3 sec;   INR: 1.67 ratio             CAPILLARY BLOOD GLUCOSE                MEDICATIONS  (STANDING):  amLODIPine   Tablet 10 milliGRAM(s) Oral daily  chlorhexidine 2% Cloths 1 Application(s) Topical <User Schedule>  clotrimazole 1% Cream 1 Application(s) Topical two times a day  finasteride 5 milliGRAM(s) Oral daily  pantoprazole    Tablet 40 milliGRAM(s) Oral before breakfast  sodium bicarbonate 650 milliGRAM(s) Oral three times a day    MEDICATIONS  (PRN):  acetaminophen     Tablet .. 650 milliGRAM(s) Oral every 8 hours PRN Temp greater or equal to 38C (100.4F), Mild Pain (1 - 3)  melatonin 3 milliGRAM(s) Oral at bedtime PRN Insomnia  ondansetron Injectable 4 milliGRAM(s) IV Push every 8 hours PRN Nausea and/or Vomiting      Care Discussed with Consultants/Other Providers [ ] YES  [ ] NO Patient is a 76y old  Male who presents with a chief complaint of ROBERT and elevated liver tests (17 Jun 2022 16:19)      INTERVAL HPI/OVERNIGHT EVENTS: seen and examined   T(C): 36.6 (06-17-22 @ 20:26), Max: 36.6 (06-17-22 @ 05:27)  HR: 100 (06-17-22 @ 20:26) (92 - 104)  BP: 128/79 (06-17-22 @ 20:26) (128/79 - 138/84)  RR: 19 (06-17-22 @ 20:26) (17 - 19)  SpO2: 95% (06-17-22 @ 20:26) (95% - 96%)  Wt(kg): --  I&O's Summary    16 Jun 2022 07:01  -  17 Jun 2022 07:00  --------------------------------------------------------  IN: 720 mL / OUT: 1225 mL / NET: -505 mL    17 Jun 2022 07:01  -  17 Jun 2022 23:32  --------------------------------------------------------  IN: 0 mL / OUT: 250 mL / NET: -250 mL        PAST MEDICAL & SURGICAL HISTORY:  Hyperlipidemia      Atrial fibrillation      Hypertension      S/P hernia repair          SOCIAL HISTORY  Alcohol:  Tobacco:  Illicit substance use:    FAMILY HISTORY:    REVIEW OF SYSTEMS:  CONSTITUTIONAL: No fever, weight loss, or fatigue  EYES: No eye pain, visual disturbances, or discharge  ENMT:  No difficulty hearing, tinnitus, vertigo; No sinus or throat pain  NECK: No pain or stiffness  RESPIRATORY: No cough, wheezing, chills or hemoptysis; No shortness of breath  CARDIOVASCULAR: No chest pain, palpitations, dizziness, or leg swelling  GASTROINTESTINAL: No abdominal or epigastric pain. No nausea, vomiting, or hematemesis; No diarrhea or constipation. No melena or hematochezia.  GENITOURINARY: No dysuria, frequency, hematuria, or incontinence  NEUROLOGICAL: No headaches, memory loss, loss of strength, numbness, or tremors  SKIN: No itching, burning, rashes, or lesions   LYMPH NODES: No enlarged glands  ENDOCRINE: No heat or cold intolerance; No hair loss  MUSCULOSKELETAL: No joint pain or swelling; No muscle, back, or extremity pain  PSYCHIATRIC: No depression, anxiety, mood swings, or difficulty sleeping  HEME/LYMPH: No easy bruising, or bleeding gums  ALLERY AND IMMUNOLOGIC: No hives or eczema    RADIOLOGY & ADDITIONAL TESTS:    Imaging Personally Reviewed:  [ ] YES  [ ] NO    Consultant(s) Notes Reviewed:  [ ] YES  [ ] NO    PHYSICAL EXAM:  GENERAL: NAD, well-groomed, well-developed  HEAD:  Atraumatic, Normocephalic  EYES: EOMI, PERRLA, conjunctiva and sclera clear  ENMT: No tonsillar erythema, exudates, or enlargement; Moist mucous membranes, Good dentition, No lesions  NECK: Supple, No JVD, Normal thyroid  NERVOUS SYSTEM:  Alert & Oriented X3, Good concentration; Motor Strength 5/5 B/L upper and lower extremities; DTRs 2+ intact and symmetric  CHEST/LUNG: Clear to percussion bilaterally; No rales, rhonchi, wheezing, or rubs  HEART: Regular rate and rhythm; No murmurs, rubs, or gallops  ABDOMEN: Soft, Nontender, Nondistended; Bowel sounds present  EXTREMITIES:  2+ Peripheral Pulses, No clubbing, cyanosis, or edema  LYMPH: No lymphadenopathy noted  SKIN: No rashes or lesions    LABS:                        11.2   13.24 )-----------( 224      ( 17 Jun 2022 06:18 )             33.8     06-17    139  |  105  |  52<H>  ----------------------------<  130<H>  4.8   |  20<L>  |  1.75<H>    Ca    9.4      17 Jun 2022 06:18    TPro  6.9  /  Alb  4.0  /  TBili  2.8<H>  /  DBili  x   /  AST  119<H>  /  ALT  79<H>  /  AlkPhos  646<H>  06-17    PT/INR - ( 17 Jun 2022 06:18 )   PT: 19.3 sec;   INR: 1.67 ratio             CAPILLARY BLOOD GLUCOSE                MEDICATIONS  (STANDING):  amLODIPine   Tablet 10 milliGRAM(s) Oral daily  chlorhexidine 2% Cloths 1 Application(s) Topical <User Schedule>  clotrimazole 1% Cream 1 Application(s) Topical two times a day  finasteride 5 milliGRAM(s) Oral daily  pantoprazole    Tablet 40 milliGRAM(s) Oral before breakfast  sodium bicarbonate 650 milliGRAM(s) Oral three times a day    MEDICATIONS  (PRN):  acetaminophen     Tablet .. 650 milliGRAM(s) Oral every 8 hours PRN Temp greater or equal to 38C (100.4F), Mild Pain (1 - 3)  melatonin 3 milliGRAM(s) Oral at bedtime PRN Insomnia  ondansetron Injectable 4 milliGRAM(s) IV Push every 8 hours PRN Nausea and/or Vomiting      Care Discussed with Consultants/Other Providers [ ] YES  [ ] NO

## 2022-06-17 NOTE — PHYSICAL THERAPY INITIAL EVALUATION ADULT - ACTIVE RANGE OF MOTION EXAMINATION, REHAB EVAL
queenie. upper extremity Active ROM was WNL (within normal limits)/bilateral lower extremity Active ROM was WNL (within normal limits)

## 2022-06-17 NOTE — PHYSICAL THERAPY INITIAL EVALUATION ADULT - DIAGNOSIS, PT EVAL
Pt p/w generalized weakness and impaired endurance, cardiopulmonary deconditioning resulting in mobility deficits.

## 2022-06-17 NOTE — PHYSICAL THERAPY INITIAL EVALUATION ADULT - BED MOBILITY TRAINING, PT EVAL
GOAL: Pt will perform bed mobility independently, in 2 weeks. GOAL: Pt will perform bed mobility independently, in 4 weeks.

## 2022-06-17 NOTE — PHYSICAL THERAPY INITIAL EVALUATION ADULT - PRECAUTIONS/LIMITATIONS, REHAB EVAL
6/15/22 US KIDNEY AND BLADDER: Bilateral increased renal cortical echogenicity suggestive of underlying renal parenchymal disease. 6/14/22 MRCP: Extensive bilobar infiltrative hepatic neoplasm as described above for which the differential diagnosis includes infiltrative hepatocellular neoplasm such as hepatocellular carcinoma as well as metastatic disease./fall precautions

## 2022-06-17 NOTE — PHYSICAL THERAPY INITIAL EVALUATION ADULT - DISCHARGE DISPOSITION, PT EVAL
to be determined following completion of functional eval for transfers and ambulation home w/ home PT

## 2022-06-17 NOTE — PHYSICAL THERAPY INITIAL EVALUATION ADULT - LIVES WITH, PROFILE
Pt lives with spouse in apartment with 32 steps +HR to access. Prior to admission pt independent with all functional mobility including ambulation with cane./spouse

## 2022-06-17 NOTE — PROGRESS NOTE ADULT - ASSESSMENT
76 year old male w/pmh significant for Afib on Eliquis , HTN ,  HLD , presenting for worsening shortness of breath found to have ROBERT on ? CKD and hyperkalemia with metabolic acidosis      1) ROBERT on ?CKD  ddx includes pre renal 2/2 dec po intake and hctz vs ATN from arb/ hemodynamic changes vs ckd progression  pt not aware of underlying kidney disease--> may have component of Hypertensive Nephropathy  ua and urine lytes reviewed  Renal US --> right kidney 9.8cm and left kidney 9.9cm--> no hydro  keep off ARB and HCTZ  s/p iV nacl @ 75cc  now on Albumin q8h---> will d/c  avoid nephrotoxins  trend bmp q12h       2) Hyperkalemia  in setting of robert/ckd/hctz/arb  s/p insulin/albuterol and nacl bolus  s/p lokelma  improved  renal diet  trend k q daily    3) Metabolic acidosis-  insetting of robert/ckd  c/w nabicarb 650mg po tid  trend bicarb        Dr Romero  [phone]

## 2022-06-17 NOTE — PHYSICAL THERAPY INITIAL EVALUATION ADULT - TRANSFER TRAINING, PT EVAL
GOAL: Pt will perform ALL transfers independently, w/use of appropriate assistive device as needed, in 2 weeks. GOAL: Pt will perform ALL transfers independently, w/use of appropriate assistive device as needed, in 4 weeks.

## 2022-06-17 NOTE — CONSULT NOTE ADULT - ASSESSMENT
Interventional Radiology    Evaluate for Procedure: liver biopsy.    HPI: 77yo M with PMH of Afib (on Eliquis), HTN, HLD admitted with worsening dyspnea found to have cirrhosis, ascites s/p Paracentesis with 260ML removal  and MRI with  extensive bilobar infiltrative hepatic neoplasm. IR consulted for liver biopsy.     Allergies:   Medications (Abx/Cardiac/Anticoagulation/Blood Products)  amLODIPine   Tablet: 10 milliGRAM(s) Oral (06-17 @ 05:30)    Data:    T(C): 36.6  HR: 104  BP: 138/76  RR: 18  SpO2: 96%    -WBC 13.24 / HgB 11.2 / Hct 33.8 / Plt 224  -Na 139 / Cl 105 / BUN 52 / Glucose 130  -K 4.8 / CO2 20 / Cr 1.75  -ALT 79 / Alk Phos 646 / T.Bili 2.8  -INR 1.67 / PTT --    Radiology:     Assessment/Plan:  77yo M with PMH of Afib (on Eliquis), HTN, HLD admitted with worsening dyspnea found to have cirrhosis, ascites s/p Paracentesis with 260ML removal  and MRI with  extensive bilobar infiltrative hepatic neoplasm. IR consulted for liver biopsy.      - Case and imaging reviewed with Dr. Miranda, ascites is a relative contraindication for liver biopsy.  Recommend awaiting results from cytology prior to liver biopsy.  - Will have to perform liver biopsy with paracentesis if needed.  - D/w GI fellow Dr. Mclain. Interventional Radiology    Evaluate for Procedure: liver biopsy.    HPI: 77yo M with PMH of Afib (on Eliquis), HTN, HLD admitted with worsening dyspnea found to have cirrhosis, ascites s/p Paracentesis with 260ML removal  and MRI with  extensive bilobar infiltrative hepatic neoplasm. IR consulted for liver biopsy.     Allergies:   Medications (Abx/Cardiac/Anticoagulation/Blood Products)  amLODIPine   Tablet: 10 milliGRAM(s) Oral (06-17 @ 05:30)    Data:    T(C): 36.6  HR: 104  BP: 138/76  RR: 18  SpO2: 96%    -WBC 13.24 / HgB 11.2 / Hct 33.8 / Plt 224  -Na 139 / Cl 105 / BUN 52 / Glucose 130  -K 4.8 / CO2 20 / Cr 1.75  -ALT 79 / Alk Phos 646 / T.Bili 2.8  -INR 1.67 / PTT --         Assessment/Plan:  77yo M with PMH of Afib (on Eliquis), HTN, HLD admitted with worsening dyspnea found to have cirrhosis, ascites s/p Paracentesis with 260ML removal  and MRI with  extensive bilobar infiltrative hepatic neoplasm. IR consulted for liver biopsy.      - Case and imaging reviewed with Dr. Miranda, ascites is a relative contraindication for liver biopsy.  S/p Paracentesis, Recommend awaiting results from cytology prior to planning for liver biopsy.  - Will have to perform liver biopsy with paracentesis if needed.  - Please reconsult when cytology results and liver biopsy is needed.  - D/w GI fellow Dr. Mclain.

## 2022-06-18 NOTE — PROGRESS NOTE ADULT - SUBJECTIVE AND OBJECTIVE BOX
Patient is a 76y old  Male who presents with a chief complaint of ROBERT and elevated liver tests (18 Jun 2022 13:18)      INTERVAL HPI/OVERNIGHT EVENTS:  T(C): 36.5 (06-18-22 @ 21:02), Max: 36.6 (06-18-22 @ 05:12)  HR: 97 (06-18-22 @ 21:02) (89 - 107)  BP: 123/76 (06-18-22 @ 21:02) (93/66 - 123/76)  RR: 18 (06-18-22 @ 21:02) (18 - 19)  SpO2: 95% (06-18-22 @ 21:02) (95% - 96%)  Wt(kg): --  I&O's Summary    17 Jun 2022 07:01  -  18 Jun 2022 07:00  --------------------------------------------------------  IN: 325 mL / OUT: 850 mL / NET: -525 mL    18 Jun 2022 07:01  -  18 Jun 2022 23:09  --------------------------------------------------------  IN: 900 mL / OUT: 400 mL / NET: 500 mL        PAST MEDICAL & SURGICAL HISTORY:  Hyperlipidemia      Atrial fibrillation      Hypertension      S/P hernia repair          SOCIAL HISTORY  Alcohol:  Tobacco:  Illicit substance use:    FAMILY HISTORY:    REVIEW OF SYSTEMS:  CONSTITUTIONAL: No fever, weight loss, or fatigue  EYES: No eye pain, visual disturbances, or discharge  ENMT:  No difficulty hearing, tinnitus, vertigo; No sinus or throat pain  NECK: No pain or stiffness  RESPIRATORY: No cough, wheezing, chills or hemoptysis; No shortness of breath  CARDIOVASCULAR: No chest pain, palpitations, dizziness, or leg swelling  GASTROINTESTINAL: No abdominal or epigastric pain. No nausea, vomiting, or hematemesis; No diarrhea or constipation. No melena or hematochezia.  GENITOURINARY: No dysuria, frequency, hematuria, or incontinence  NEUROLOGICAL: No headaches, memory loss, loss of strength, numbness, or tremors  SKIN: No itching, burning, rashes, or lesions   LYMPH NODES: No enlarged glands  ENDOCRINE: No heat or cold intolerance; No hair loss  MUSCULOSKELETAL: No joint pain or swelling; No muscle, back, or extremity pain  PSYCHIATRIC: No depression, anxiety, mood swings, or difficulty sleeping  HEME/LYMPH: No easy bruising, or bleeding gums  ALLERY AND IMMUNOLOGIC: No hives or eczema    RADIOLOGY & ADDITIONAL TESTS:    Imaging Personally Reviewed:  [ ] YES  [ ] NO    Consultant(s) Notes Reviewed:  [ ] YES  [ ] NO    PHYSICAL EXAM:  GENERAL: NAD, well-groomed, well-developed  HEAD:  Atraumatic, Normocephalic  EYES: EOMI, PERRLA, conjunctiva and sclera clear  ENMT: No tonsillar erythema, exudates, or enlargement; Moist mucous membranes, Good dentition, No lesions  NECK: Supple, No JVD, Normal thyroid  NERVOUS SYSTEM:  Alert & Oriented X3, Good concentration; Motor Strength 5/5 B/L upper and lower extremities; DTRs 2+ intact and symmetric  CHEST/LUNG: Clear to percussion bilaterally; No rales, rhonchi, wheezing, or rubs  HEART: Regular rate and rhythm; No murmurs, rubs, or gallops  ABDOMEN: Soft, Nontender, Nondistended; Bowel sounds present  EXTREMITIES:  2+ Peripheral Pulses, No clubbing, cyanosis, or edema  LYMPH: No lymphadenopathy noted  SKIN: No rashes or lesions    LABS:                        11.8   14.89 )-----------( 231      ( 18 Jun 2022 07:24 )             36.3     06-18    138  |  104  |  51<H>  ----------------------------<  118<H>  4.7   |  21<L>  |  1.77<H>    Ca    9.2      18 Jun 2022 07:21  Phos  2.5     06-18  Mg     2.1     06-18    TPro  6.6  /  Alb  3.7  /  TBili  2.8<H>  /  DBili  1.6<H>  /  AST  113<H>  /  ALT  80<H>  /  AlkPhos  644<H>  06-18    PT/INR - ( 18 Jun 2022 07:27 )   PT: 16.7 sec;   INR: 1.43 ratio             CAPILLARY BLOOD GLUCOSE                MEDICATIONS  (STANDING):  amLODIPine   Tablet 10 milliGRAM(s) Oral daily  chlorhexidine 2% Cloths 1 Application(s) Topical <User Schedule>  clotrimazole 1% Cream 1 Application(s) Topical two times a day  finasteride 5 milliGRAM(s) Oral daily  pantoprazole    Tablet 40 milliGRAM(s) Oral before breakfast  sodium bicarbonate 650 milliGRAM(s) Oral three times a day    MEDICATIONS  (PRN):  acetaminophen     Tablet .. 650 milliGRAM(s) Oral every 8 hours PRN Temp greater or equal to 38C (100.4F), Mild Pain (1 - 3)  melatonin 3 milliGRAM(s) Oral at bedtime PRN Insomnia  ondansetron Injectable 4 milliGRAM(s) IV Push every 8 hours PRN Nausea and/or Vomiting      Care Discussed with Consultants/Other Providers [ ] YES  [ ] NO Patient is a 76y old  Male who presents with a chief complaint of ROBERT and elevated liver tests (18 Jun 2022 13:18)      INTERVAL HPI/OVERNIGHT EVENTS: seen and examined   T(C): 36.5 (06-18-22 @ 21:02), Max: 36.6 (06-18-22 @ 05:12)  HR: 97 (06-18-22 @ 21:02) (89 - 107)  BP: 123/76 (06-18-22 @ 21:02) (93/66 - 123/76)  RR: 18 (06-18-22 @ 21:02) (18 - 19)  SpO2: 95% (06-18-22 @ 21:02) (95% - 96%)  Wt(kg): --  I&O's Summary    17 Jun 2022 07:01  -  18 Jun 2022 07:00  --------------------------------------------------------  IN: 325 mL / OUT: 850 mL / NET: -525 mL    18 Jun 2022 07:01  -  18 Jun 2022 23:09  --------------------------------------------------------  IN: 900 mL / OUT: 400 mL / NET: 500 mL        PAST MEDICAL & SURGICAL HISTORY:  Hyperlipidemia      Atrial fibrillation      Hypertension      S/P hernia repair          SOCIAL HISTORY  Alcohol:  Tobacco:  Illicit substance use:    FAMILY HISTORY:    REVIEW OF SYSTEMS:  CONSTITUTIONAL: No fever, weight loss, or fatigue  EYES: No eye pain, visual disturbances, or discharge  ENMT:  No difficulty hearing, tinnitus, vertigo; No sinus or throat pain  NECK: No pain or stiffness  RESPIRATORY: No cough, wheezing, chills or hemoptysis; No shortness of breath  CARDIOVASCULAR: No chest pain, palpitations, dizziness, or leg swelling  GASTROINTESTINAL: No abdominal or epigastric pain. No nausea, vomiting, or hematemesis; No diarrhea or constipation. No melena or hematochezia.  GENITOURINARY: No dysuria, frequency, hematuria, or incontinence  NEUROLOGICAL: No headaches, memory loss, loss of strength, numbness, or tremors  SKIN: No itching, burning, rashes, or lesions   LYMPH NODES: No enlarged glands  ENDOCRINE: No heat or cold intolerance; No hair loss  MUSCULOSKELETAL: No joint pain or swelling; No muscle, back, or extremity pain  PSYCHIATRIC: No depression, anxiety, mood swings, or difficulty sleeping  HEME/LYMPH: No easy bruising, or bleeding gums  ALLERY AND IMMUNOLOGIC: No hives or eczema    RADIOLOGY & ADDITIONAL TESTS:    Imaging Personally Reviewed:  [ ] YES  [ ] NO    Consultant(s) Notes Reviewed:  [ ] YES  [ ] NO    PHYSICAL EXAM:  GENERAL: NAD, well-groomed, well-developed  HEAD:  Atraumatic, Normocephalic  EYES: EOMI, PERRLA, conjunctiva and sclera clear  ENMT: No tonsillar erythema, exudates, or enlargement; Moist mucous membranes, Good dentition, No lesions  NECK: Supple, No JVD, Normal thyroid  NERVOUS SYSTEM:  Alert & Oriented X3, Good concentration; Motor Strength 5/5 B/L upper and lower extremities; DTRs 2+ intact and symmetric  CHEST/LUNG: Clear to percussion bilaterally; No rales, rhonchi, wheezing, or rubs  HEART: Regular rate and rhythm; No murmurs, rubs, or gallops  ABDOMEN: Soft, Nontender, Nondistended; Bowel sounds present  EXTREMITIES:  2+ Peripheral Pulses, No clubbing, cyanosis, or edema  LYMPH: No lymphadenopathy noted  SKIN: No rashes or lesions    LABS:                        11.8   14.89 )-----------( 231      ( 18 Jun 2022 07:24 )             36.3     06-18    138  |  104  |  51<H>  ----------------------------<  118<H>  4.7   |  21<L>  |  1.77<H>    Ca    9.2      18 Jun 2022 07:21  Phos  2.5     06-18  Mg     2.1     06-18    TPro  6.6  /  Alb  3.7  /  TBili  2.8<H>  /  DBili  1.6<H>  /  AST  113<H>  /  ALT  80<H>  /  AlkPhos  644<H>  06-18    PT/INR - ( 18 Jun 2022 07:27 )   PT: 16.7 sec;   INR: 1.43 ratio             CAPILLARY BLOOD GLUCOSE                MEDICATIONS  (STANDING):  amLODIPine   Tablet 10 milliGRAM(s) Oral daily  chlorhexidine 2% Cloths 1 Application(s) Topical <User Schedule>  clotrimazole 1% Cream 1 Application(s) Topical two times a day  finasteride 5 milliGRAM(s) Oral daily  pantoprazole    Tablet 40 milliGRAM(s) Oral before breakfast  sodium bicarbonate 650 milliGRAM(s) Oral three times a day    MEDICATIONS  (PRN):  acetaminophen     Tablet .. 650 milliGRAM(s) Oral every 8 hours PRN Temp greater or equal to 38C (100.4F), Mild Pain (1 - 3)  melatonin 3 milliGRAM(s) Oral at bedtime PRN Insomnia  ondansetron Injectable 4 milliGRAM(s) IV Push every 8 hours PRN Nausea and/or Vomiting      Care Discussed with Consultants/Other Providers [ ] YES  [ ] NO

## 2022-06-18 NOTE — PROGRESS NOTE ADULT - ASSESSMENT
76 year old male w/pmh significant for Afib on Eliquis , HTN ,  HLD , presenting for worsening shortness of breath found to have ROBERT on ? CKD and hyperkalemia with metabolic acidosis      1) ROBERT on ?CKD  ddx includes pre renal 2/2 dec po intake and hctz vs ATN from arb/ hemodynamic changes vs ckd progression  pt not aware of underlying kidney disease--> may have component of Hypertensive Nephropathy  ua and urine lytes reviewed  Renal US --> right kidney 9.8cm and left kidney 9.9cm--> no hydro  keep off ARB and HCTZ  s/p iV nacl @ 75cc  off albumin as well  avoid nephrotoxins  trend bmp q12h       2) Hyperkalemia  in setting of robert/ckd/hctz/arb  s/p insulin/albuterol and nacl bolus  s/p lokelma  improved  renal diet  trend k q daily    3) Metabolic acidosis-  insetting of robert/ckd  c/w nabicarb 650mg po tid  trend bicarb        Dr Romero  [phone]

## 2022-06-18 NOTE — PROGRESS NOTE ADULT - SUBJECTIVE AND OBJECTIVE BOX
Patient seen and examined  no complaints      No Known Allergies    Hospital Medications:   MEDICATIONS  (STANDING):  amLODIPine   Tablet 10 milliGRAM(s) Oral daily  chlorhexidine 2% Cloths 1 Application(s) Topical <User Schedule>  clotrimazole 1% Cream 1 Application(s) Topical two times a day  finasteride 5 milliGRAM(s) Oral daily  pantoprazole    Tablet 40 milliGRAM(s) Oral before breakfast  sodium bicarbonate 650 milliGRAM(s) Oral three times a day      VITALS:  T(F): 97.7 (22 @ 12:14), Max: 97.9 (22 @ 05:12)  HR: 107 (22 @ 12:14)  BP: 93/66 (22 @ 12:14)  RR: 18 (22 @ 12:14)  SpO2: 96% (22 @ 12:14)  Wt(kg): --     @ 07:01  -   @ 07:00  --------------------------------------------------------  IN: 325 mL / OUT: 850 mL / NET: -525 mL     @ 07:01  -   @ 13:19  --------------------------------------------------------  IN: 360 mL / OUT: 0 mL / NET: 360 mL      PHYSICAL EXAM:  Constitutional: NAD  HEENT: anicteric sclera, oropharynx clear, MMM  Neck: No JVD  Respiratory: b/l rhonchi  Cardiovascular: S1, S2, RRR  Gastrointestinal: BS+, soft, NT/ND  Extremities: +peripheral edema  Neurological: A/O x 3, no focal deficits  Psychiatric: Normal mood, normal affect  : No CVA tenderness. No fisher.   Skin: No rashes    LABS:      138  |  104  |  51<H>  ----------------------------<  118<H>  4.7   |  21<L>  |  1.77<H>    Ca    9.2      2022 07:21  Phos  2.5       Mg     2.1         TPro  6.6  /  Alb  3.7  /  TBili  2.8<H>  /  DBili  1.6<H>  /  AST  113<H>  /  ALT  80<H>  /  AlkPhos  644<H>      Creatinine Trend: 1.77 <--, 1.75 <--, 1.97 <--, 2.68 <--, 3.16 <--, 3.12 <--, 3.37 <--, 3.62 <--                        11.8   14.89 )-----------( 231      ( 2022 07:24 )             36.3     Urine Studies:  Urinalysis Basic - ( 15 Ernesto 2022 07:35 )    Color: Yellow / Appearance: Slightly Turbid / S.017 / pH:   Gluc:  / Ketone: Negative  / Bili: Negative / Urobili: 2 mg/dL   Blood:  / Protein: Trace / Nitrite: Negative   Leuk Esterase: Negative / RBC: 5 /hpf / WBC 0 /HPF   Sq Epi:  / Non Sq Epi: 0 /hpf / Bacteria: Negative      Potassium, Random Urine: 21 mmol/L (06-15 @ 07:35)  Osmolality, Random Urine: 569 mos/kg (06-15 @ 07:35)  Sodium, Random Urine: 72 mmol/L (06-15 @ 07:35)  Chloride, Random Urine: 66 mmol/L (06-15 @ 07:35)  Creatinine, Random Urine: 81 mg/dL (06-15 @ 07:35)  Protein/Creatinine Ratio Calculation: 0.2 Ratio (06-15 @ 07:35)  Creatinine, Random Urine: 91 mg/dL ( @ 08:58)  Protein/Creatinine Ratio Calculation: 0.2 Ratio ( @ 08:58)  Creatinine, Random Urine: 91 mg/dL ( @ 08:58)    RADIOLOGY & ADDITIONAL STUDIES:

## 2022-06-19 NOTE — PROGRESS NOTE ADULT - SUBJECTIVE AND OBJECTIVE BOX
Interval Events:   No acute events overnight.  Patient without acute symptoms at this time.    ROS:   12 point review of systems performed and negative except otherwise noted in HPI.    Hospital Medications:  acetaminophen     Tablet .. 650 milliGRAM(s) Oral every 8 hours PRN  amLODIPine   Tablet 10 milliGRAM(s) Oral daily  chlorhexidine 2% Cloths 1 Application(s) Topical <User Schedule>  clotrimazole 1% Cream 1 Application(s) Topical two times a day  finasteride 5 milliGRAM(s) Oral daily  melatonin 3 milliGRAM(s) Oral at bedtime PRN  ondansetron Injectable 4 milliGRAM(s) IV Push every 8 hours PRN  pantoprazole    Tablet 40 milliGRAM(s) Oral before breakfast  sodium bicarbonate 650 milliGRAM(s) Oral three times a day  sodium chloride 0.9%. 1000 milliLiter(s) IV Continuous <Continuous>      PHYSICAL EXAM:   Vital Signs:  Vital Signs Last 24 Hrs  T(C): 36.6 (19 Jun 2022 04:01), Max: 36.6 (19 Jun 2022 04:01)  T(F): 97.9 (19 Jun 2022 04:01), Max: 97.9 (19 Jun 2022 04:01)  HR: 94 (19 Jun 2022 05:12) (94 - 107)  BP: 117/74 (19 Jun 2022 05:12) (93/66 - 123/76)  BP(mean): --  RR: 18 (19 Jun 2022 05:12) (18 - 18)  SpO2: 91% (19 Jun 2022 04:01) (91% - 96%)  Daily     Daily     GENERAL: no acute distress  NEURO: alert  HEENT: NCAT, no conjunctival pallor appreciated  CHEST: no respiratory distress, no accessory muscle use  CARDIAC: regular rate, +S1/S2  ABDOMEN: soft, nontender, no rebound or guarding  EXTREMITIES: warm, well perfused  SKIN: no lesions noted    LABS: reviewed                        11.5   14.10 )-----------( 213      ( 19 Jun 2022 07:17 )             35.0     06-19    131<L>  |  98  |  60<H>  ----------------------------<  96  4.8   |  19<L>  |  2.17<H>    Ca    9.0      19 Jun 2022 07:11  Phos  2.5     06-18  Mg     2.1     06-18    TPro  6.4  /  Alb  3.2<L>  /  TBili  2.4<H>  /  DBili  x   /  AST  171<H>  /  ALT  97<H>  /  AlkPhos  655<H>  06-19    LIVER FUNCTIONS - ( 19 Jun 2022 07:11 )  Alb: 3.2 g/dL / Pro: 6.4 g/dL / ALK PHOS: 655 U/L / ALT: 97 U/L / AST: 171 U/L / GGT: x             Interval Diagnostic Studies: see sunrise for full report

## 2022-06-19 NOTE — PROGRESS NOTE ADULT - ASSESSMENT
76 year old male w/pmh significant for Afib on Eliquis , HTN ,  HLD , presenting for worsening shortness of breath found to have ROBERT on ? CKD and hyperkalemia with metabolic acidosis      1) ROBERT on ?CKD  ddx includes pre renal 2/2 dec po intake and hctz vs ATN from arb/ hemodynamic changes vs ckd progression  pt not aware of underlying kidney disease--> may have component of Hypertensive Nephropathy  Renal US --> right kidney 9.8cm and left kidney 9.9cm--> no hydro  keep off ARB and HCTZ  cr slightly higher--> fluctuations expected-->pt tolerating po   iV nacl @ 75cc x 6 hours  avoid nephrotoxins  trend bmp q12h       2) Hyperkalemia  in setting of robert/ckd/hctz/arb  s/p insulin/albuterol and nacl bolus  s/p lokelma  improved  renal diet  trend k q daily    3) Metabolic acidosis-  insetting of robert/ckd  c/w nabicarb 650mg po tid  trend bicarb        Dr Romero  [phone]

## 2022-06-19 NOTE — PROGRESS NOTE ADULT - ASSESSMENT
75yo M with PMH of Afib (on Eliquis), HTN, HLD who presents with worsening dyspnea. Hepatology consulted for cirrhosis and elevated liver tests.    # Elevated liver tests - Noted infiltrative disease on MRI (w/o contrast) - ddx includes HCC vs. metastatic disease  # ROBERT - on CKD, unclear baseline, urine lytes not consistent with HRS/pre-renal  # Cirrhosis - MELD 31. Unclear etiology, suspect ARTHUR cirrhosis vs. cardiac vs. other.  Ascites - minimal  Varices - unclear  HCC - unknown  PSE - non on PE  # Afib - on eliquis    Recommendations:  - recommend liver biopsy per IR tomorrow  - MRI abdomen with contrast reveals Diffusely infiltrated masses in the liver which may represent metastatic disease or multifocal HCC  - f/u NIYAH, ASMA, AMA, hepatitis panel (HBV, HCV, HAV IgM), EBV PCR, CMV PCR, HSV PCR  - hold statin  - Not a transplant candidate given age    Recommendations incomplete until finalized by attending signature/attestation to note.    Gibson Adamson, PGY-4  GI/Hepatology Fellow    MONDAY-FRIDAY 8AM-5PM:  Pager# [phone] ([facility]) or [phone] ([facility])    NON-URGENT CONSULTS:  Please email [email or web address]  AT NIGHT AND ON WEEKENDS:  Contact on-call GI fellow via answering service ([phone]) from 5pm-8am and on weekends/holidays

## 2022-06-19 NOTE — PROGRESS NOTE ADULT - SUBJECTIVE AND OBJECTIVE BOX
Patient is a 76y old  Male who presents with a chief complaint of ROBERT and elevated liver tests (19 Jun 2022 14:32)      INTERVAL HPI/OVERNIGHT EVENTS:  T(C): 36.5 (06-19-22 @ 20:59), Max: 36.6 (06-19-22 @ 04:01)  HR: 94 (06-19-22 @ 20:59) (94 - 99)  BP: 105/67 (06-19-22 @ 20:59) (99/61 - 117/74)  RR: 18 (06-19-22 @ 20:59) (18 - 18)  SpO2: 92% (06-19-22 @ 20:59) (91% - 93%)  Wt(kg): --  I&O's Summary    18 Jun 2022 07:01  -  19 Jun 2022 07:00  --------------------------------------------------------  IN: 960 mL / OUT: 725 mL / NET: 235 mL    19 Jun 2022 07:01  -  19 Jun 2022 22:50  --------------------------------------------------------  IN: 360 mL / OUT: 250 mL / NET: 110 mL        PAST MEDICAL & SURGICAL HISTORY:  Hyperlipidemia      Atrial fibrillation      Hypertension      S/P hernia repair          SOCIAL HISTORY  Alcohol:  Tobacco:  Illicit substance use:    FAMILY HISTORY:    REVIEW OF SYSTEMS:  CONSTITUTIONAL: No fever, weight loss, or fatigue  EYES: No eye pain, visual disturbances, or discharge  ENMT:  No difficulty hearing, tinnitus, vertigo; No sinus or throat pain  NECK: No pain or stiffness  RESPIRATORY: No cough, wheezing, chills or hemoptysis; No shortness of breath  CARDIOVASCULAR: No chest pain, palpitations, dizziness, or leg swelling  GASTROINTESTINAL: No abdominal or epigastric pain. No nausea, vomiting, or hematemesis; No diarrhea or constipation. No melena or hematochezia.  GENITOURINARY: No dysuria, frequency, hematuria, or incontinence  NEUROLOGICAL: No headaches, memory loss, loss of strength, numbness, or tremors  SKIN: No itching, burning, rashes, or lesions   LYMPH NODES: No enlarged glands  ENDOCRINE: No heat or cold intolerance; No hair loss  MUSCULOSKELETAL: No joint pain or swelling; No muscle, back, or extremity pain  PSYCHIATRIC: No depression, anxiety, mood swings, or difficulty sleeping  HEME/LYMPH: No easy bruising, or bleeding gums  ALLERY AND IMMUNOLOGIC: No hives or eczema    RADIOLOGY & ADDITIONAL TESTS:    Imaging Personally Reviewed:  [ ] YES  [ ] NO    Consultant(s) Notes Reviewed:  [ ] YES  [ ] NO    PHYSICAL EXAM:  GENERAL: NAD, well-groomed, well-developed  HEAD:  Atraumatic, Normocephalic  EYES: EOMI, PERRLA, conjunctiva and sclera clear  ENMT: No tonsillar erythema, exudates, or enlargement; Moist mucous membranes, Good dentition, No lesions  NECK: Supple, No JVD, Normal thyroid  NERVOUS SYSTEM:  Alert & Oriented X3, Good concentration; Motor Strength 5/5 B/L upper and lower extremities; DTRs 2+ intact and symmetric  CHEST/LUNG: Clear to percussion bilaterally; No rales, rhonchi, wheezing, or rubs  HEART: Regular rate and rhythm; No murmurs, rubs, or gallops  ABDOMEN: Soft, Nontender, Nondistended; Bowel sounds present  EXTREMITIES:  2+ Peripheral Pulses, No clubbing, cyanosis, or edema  LYMPH: No lymphadenopathy noted  SKIN: No rashes or lesions    LABS:                        11.5   14.10 )-----------( 213      ( 19 Jun 2022 07:17 )             35.0     06-19    131<L>  |  98  |  60<H>  ----------------------------<  96  4.8   |  19<L>  |  2.17<H>    Ca    9.0      19 Jun 2022 07:11  Phos  2.5     06-18  Mg     2.1     06-18    TPro  6.4  /  Alb  3.2<L>  /  TBili  2.4<H>  /  DBili  x   /  AST  171<H>  /  ALT  97<H>  /  AlkPhos  655<H>  06-19    PT/INR - ( 19 Jun 2022 07:31 )   PT: 16.5 sec;   INR: 1.42 ratio         PTT - ( 19 Jun 2022 07:31 )  PTT:29.6 sec    CAPILLARY BLOOD GLUCOSE                MEDICATIONS  (STANDING):  amLODIPine   Tablet 10 milliGRAM(s) Oral daily  chlorhexidine 2% Cloths 1 Application(s) Topical <User Schedule>  clotrimazole 1% Cream 1 Application(s) Topical two times a day  finasteride 5 milliGRAM(s) Oral daily  pantoprazole    Tablet 40 milliGRAM(s) Oral before breakfast  sodium bicarbonate 650 milliGRAM(s) Oral three times a day  sodium chloride 0.9%. 1000 milliLiter(s) (75 mL/Hr) IV Continuous <Continuous>    MEDICATIONS  (PRN):  acetaminophen     Tablet .. 650 milliGRAM(s) Oral every 8 hours PRN Temp greater or equal to 38C (100.4F), Mild Pain (1 - 3)  melatonin 3 milliGRAM(s) Oral at bedtime PRN Insomnia  ondansetron Injectable 4 milliGRAM(s) IV Push every 8 hours PRN Nausea and/or Vomiting      Care Discussed with Consultants/Other Providers [ ] YES  [ ] NO Patient is a 76y old  Male who presents with a chief complaint of ROBERT and elevated liver tests (19 Jun 2022 14:32)      INTERVAL HPI/OVERNIGHT EVENTS: seen and examined   T(C): 36.5 (06-19-22 @ 20:59), Max: 36.6 (06-19-22 @ 04:01)  HR: 94 (06-19-22 @ 20:59) (94 - 99)  BP: 105/67 (06-19-22 @ 20:59) (99/61 - 117/74)  RR: 18 (06-19-22 @ 20:59) (18 - 18)  SpO2: 92% (06-19-22 @ 20:59) (91% - 93%)  Wt(kg): --  I&O's Summary    18 Jun 2022 07:01  -  19 Jun 2022 07:00  --------------------------------------------------------  IN: 960 mL / OUT: 725 mL / NET: 235 mL    19 Jun 2022 07:01  -  19 Jun 2022 22:50  --------------------------------------------------------  IN: 360 mL / OUT: 250 mL / NET: 110 mL        PAST MEDICAL & SURGICAL HISTORY:  Hyperlipidemia      Atrial fibrillation      Hypertension      S/P hernia repair          SOCIAL HISTORY  Alcohol:  Tobacco:  Illicit substance use:    FAMILY HISTORY:    REVIEW OF SYSTEMS:  CONSTITUTIONAL: No fever, weight loss, or fatigue  EYES: No eye pain, visual disturbances, or discharge  ENMT:  No difficulty hearing, tinnitus, vertigo; No sinus or throat pain  NECK: No pain or stiffness  RESPIRATORY: No cough, wheezing, chills or hemoptysis; No shortness of breath  CARDIOVASCULAR: No chest pain, palpitations, dizziness, or leg swelling  GASTROINTESTINAL: No abdominal or epigastric pain. No nausea, vomiting, or hematemesis; No diarrhea or constipation. No melena or hematochezia.  GENITOURINARY: No dysuria, frequency, hematuria, or incontinence  NEUROLOGICAL: No headaches, memory loss, loss of strength, numbness, or tremors  SKIN: No itching, burning, rashes, or lesions   LYMPH NODES: No enlarged glands  ENDOCRINE: No heat or cold intolerance; No hair loss  MUSCULOSKELETAL: No joint pain or swelling; No muscle, back, or extremity pain  PSYCHIATRIC: No depression, anxiety, mood swings, or difficulty sleeping  HEME/LYMPH: No easy bruising, or bleeding gums  ALLERY AND IMMUNOLOGIC: No hives or eczema    RADIOLOGY & ADDITIONAL TESTS:    Imaging Personally Reviewed:  [ ] YES  [ ] NO    Consultant(s) Notes Reviewed:  [ ] YES  [ ] NO    PHYSICAL EXAM:  GENERAL: NAD, well-groomed, well-developed  HEAD:  Atraumatic, Normocephalic  EYES: EOMI, PERRLA, conjunctiva and sclera clear  ENMT: No tonsillar erythema, exudates, or enlargement; Moist mucous membranes, Good dentition, No lesions  NECK: Supple, No JVD, Normal thyroid  NERVOUS SYSTEM:  Alert & Oriented X3, Good concentration; Motor Strength 5/5 B/L upper and lower extremities; DTRs 2+ intact and symmetric  CHEST/LUNG: Clear to percussion bilaterally; No rales, rhonchi, wheezing, or rubs  HEART: Regular rate and rhythm; No murmurs, rubs, or gallops  ABDOMEN: Soft, Nontender, Nondistended; Bowel sounds present  EXTREMITIES:  2+ Peripheral Pulses, No clubbing, cyanosis, or edema  LYMPH: No lymphadenopathy noted  SKIN: No rashes or lesions    LABS:                        11.5   14.10 )-----------( 213      ( 19 Jun 2022 07:17 )             35.0     06-19    131<L>  |  98  |  60<H>  ----------------------------<  96  4.8   |  19<L>  |  2.17<H>    Ca    9.0      19 Jun 2022 07:11  Phos  2.5     06-18  Mg     2.1     06-18    TPro  6.4  /  Alb  3.2<L>  /  TBili  2.4<H>  /  DBili  x   /  AST  171<H>  /  ALT  97<H>  /  AlkPhos  655<H>  06-19    PT/INR - ( 19 Jun 2022 07:31 )   PT: 16.5 sec;   INR: 1.42 ratio         PTT - ( 19 Jun 2022 07:31 )  PTT:29.6 sec    CAPILLARY BLOOD GLUCOSE                MEDICATIONS  (STANDING):  amLODIPine   Tablet 10 milliGRAM(s) Oral daily  chlorhexidine 2% Cloths 1 Application(s) Topical <User Schedule>  clotrimazole 1% Cream 1 Application(s) Topical two times a day  finasteride 5 milliGRAM(s) Oral daily  pantoprazole    Tablet 40 milliGRAM(s) Oral before breakfast  sodium bicarbonate 650 milliGRAM(s) Oral three times a day  sodium chloride 0.9%. 1000 milliLiter(s) (75 mL/Hr) IV Continuous <Continuous>    MEDICATIONS  (PRN):  acetaminophen     Tablet .. 650 milliGRAM(s) Oral every 8 hours PRN Temp greater or equal to 38C (100.4F), Mild Pain (1 - 3)  melatonin 3 milliGRAM(s) Oral at bedtime PRN Insomnia  ondansetron Injectable 4 milliGRAM(s) IV Push every 8 hours PRN Nausea and/or Vomiting      Care Discussed with Consultants/Other Providers [ ] YES  [ ] NO

## 2022-06-19 NOTE — CONSULT NOTE ADULT - SUBJECTIVE AND OBJECTIVE BOX
[facility] Brief Consult Note    Evaluate for Procedure: Liver biopsy and paracentesis    HPI: 77yo M with PMH of Afib (on Eliquis), HTN, HLD admitted with worsening dyspnea found to have cirrhosis, ascites s/p Paracentesis with 260ML removal  and MRI with  extensive bilobar infiltrative hepatic neoplasm. s/p para without informative cytology. IR reconsulted for liver biopsy.    Allergies:   Medications (Abx/Cardiac/Anticoagulation/Blood Products)  amLODIPine   Tablet: 10 milliGRAM(s) Oral (06-19 @ 05:15)    Data:    T(C): 36.4  HR: 97  BP: 99/61  RR: 18  SpO2: 93%    -WBC 14.10 / HgB 11.5 / Hct 35.0 / Plt 213  -Na 131 / Cl 98 / BUN 60 / Glucose 96  -K 4.8 / CO2 19 / Cr 2.17  -ALT 97 / Alk Phos 655 / T.Bili 2.4  -INR1.42    Imaging: Reviewed    Plan  - Will plan for paracentesis and liver biopsy on Tuesday 6/21 provided no contraindications arise.  - Please place order for IR Procedure, approving attending Dr. Miranda  - NPO past midnight Monday into Tuesday  - hold therapeutic and prophylactic anticoagulants  - maintain active type and screen x 2  - maintain active covid screening  - Please draw AM labs - CBC/INR/BMP

## 2022-06-19 NOTE — PROGRESS NOTE ADULT - SUBJECTIVE AND OBJECTIVE BOX
Patient seen and examined  no complaints    No Known Allergies    Hospital Medications:   MEDICATIONS  (STANDING):  amLODIPine   Tablet 10 milliGRAM(s) Oral daily  chlorhexidine 2% Cloths 1 Application(s) Topical <User Schedule>  clotrimazole 1% Cream 1 Application(s) Topical two times a day  finasteride 5 milliGRAM(s) Oral daily  pantoprazole    Tablet 40 milliGRAM(s) Oral before breakfast  sodium bicarbonate 650 milliGRAM(s) Oral three times a day      VITALS:  T(F): 97.9 (22 @ 04:01), Max: 97.9 (22 @ 04:01)  HR: 94 (22 @ 05:12)  BP: 117/74 (22 @ 05:12)  RR: 18 (22 @ 05:12)  SpO2: 91% (22 @ 04:01)  Wt(kg): --     @ 07:01  -   @ 07:00  --------------------------------------------------------  IN: 960 mL / OUT: 725 mL / NET: 235 mL    PHYSICAL EXAM:  Constitutional: NAD  HEENT: anicteric sclera, oropharynx clear, MMM  Neck: No JVD  Respiratory: b/l rhonchi  Cardiovascular: S1, S2, RRR  Gastrointestinal: BS+, soft, NT/ND  Extremities: +peripheral edema  Neurological: A/O x 3, no focal deficits  Psychiatric: Normal mood, normal affect  : No CVA tender    LABS:      131<L>  |  98  |  60<H>  ----------------------------<  96  4.8   |  19<L>  |  2.17<H>    Ca    9.0      2022 07:11  Phos  2.5     -18  Mg     2.1     -18    TPro  6.4  /  Alb  3.2<L>  /  TBili  2.4<H>  /  DBili      /  AST  171<H>  /  ALT  97<H>  /  AlkPhos  655<H>      Creatinine Trend: 2.17 <--, 1.77 <--, 1.75 <--, 1.97 <--, 2.68 <--, 3.16 <--, 3.12 <--, 3.37 <--, 3.62 <--                        11.5   14.10 )-----------( 213      ( 2022 07:17 )             35.0     Urine Studies:  Urinalysis Basic - ( 15 Ernesto 2022 07:35 )    Color: Yellow / Appearance: Slightly Turbid / S.017 / pH:   Gluc:  / Ketone: Negative  / Bili: Negative / Urobili: 2 mg/dL   Blood:  / Protein: Trace / Nitrite: Negative   Leuk Esterase: Negative / RBC: 5 /hpf / WBC 0 /HPF   Sq Epi:  / Non Sq Epi: 0 /hpf / Bacteria: Negative      Potassium, Random Urine: 21 mmol/L (06-15 @ 07:35)  Osmolality, Random Urine: 569 mos/kg (06-15 @ 07:35)  Sodium, Random Urine: 72 mmol/L (06-15 @ 07:35)  Chloride, Random Urine: 66 mmol/L (06-15 @ 07:35)  Creatinine, Random Urine: 81 mg/dL (06-15 @ 07:35)  Protein/Creatinine Ratio Calculation: 0.2 Ratio (06-15 @ 07:35)  Creatinine, Random Urine: 91 mg/dL ( @ 08:58)  Protein/Creatinine Ratio Calculation: 0.2 Ratio ( @ 08:58)  Creatinine, Random Urine: 91 mg/dL ( @ 08:58)    RADIOLOGY & ADDITIONAL STUDIES:

## 2022-06-20 NOTE — PROGRESS NOTE ADULT - SUBJECTIVE AND OBJECTIVE BOX
Patient is a 76 year old male w/pmh significant for Afib on Eliquis , HTN ,  HLD , admitted for worsening shortness of breath over the several weeks PTA on 6/14/22  Patient reported previous unlimited exercise tolerance , however, over the past several weeks  he became increasingly dyspneic with exertion , and now  short of breath with minimal activity. Patient reported no chest pain , no palpitations ,  he had some lower extremity edema , no orthopnea and no PND. He reported no cough or fever. Patient was evaluated by his PMD and bloodwork  showed elevated liver tests and decreased renal function. Patient also reported being evaluated by cardiology and echocardiogram reportedly showed no major abnormalities.  Repeat lab  testing showed worsening of renal function and patient was referred to the hospital for further evaluation. Patient reports no dysuria. He reports no alcohol use.   Seen for MRCP findings suggestive of infiltrative neoplasm  Seen by renal and hepatology  PAST MEDICAL & SURGICAL HISTORY:  Hyperlipidemia      Atrial fibrillation      Hypertension      S/P hernia repair        Allergies    No Known Allergies    Intolerances      Social History:   , lives with wife   former smoker 2ppd x many years , quit about 25 years ago   no alcohol use (14 Jun 2022 03:05)    Medications:  acetaminophen     Tablet .. 650 milliGRAM(s) Oral every 8 hours PRN Temp greater or equal to 38C (100.4F), Mild Pain (1 - 3)  amLODIPine   Tablet 10 milliGRAM(s) Oral daily  chlorhexidine 2% Cloths 1 Application(s) Topical <User Schedule>  clotrimazole 1% Cream 1 Application(s) Topical two times a day  finasteride 5 milliGRAM(s) Oral daily  melatonin 3 milliGRAM(s) Oral at bedtime PRN Insomnia  ondansetron Injectable 4 milliGRAM(s) IV Push every 8 hours PRN Nausea and/or Vomiting  pantoprazole    Tablet 40 milliGRAM(s) Oral before breakfast  sodium bicarbonate 650 milliGRAM(s) Oral three times a day  sodium chloride 0.9%. 1000 milliLiter(s) IV Continuous <Continuous>    Labs:  CBC Full  -  ( 20 Jun 2022 07:19 )  WBC Count : 15.78 K/uL  RBC Count : 3.97 M/uL  Hemoglobin : 12.1 g/dL  Hematocrit : 37.2 %  Platelet Count - Automated : 224 K/uL  Mean Cell Volume : 93.7 fl  Mean Cell Hemoglobin : 30.5 pg  Mean Cell Hemoglobin Concentration : 32.5 gm/dL  Auto Neutrophil # : x  Auto Lymphocyte # : x  Auto Monocyte # : x  Auto Eosinophil # : x  Auto Basophil # : x  Auto Neutrophil % : x  Auto Lymphocyte % : x  Auto Monocyte % : x  Auto Eosinophil % : x  Auto Basophil % : x    06-20    132<L>  |  98  |  71<H>  ----------------------------<  94  5.4<H>   |  18<L>  |  2.84<H>    Ca    9.0      20 Jun 2022 07:22    TPro  6.7  /  Alb  3.3  /  TBili  2.6<H>  /  DBili  x   /  AST  143<H>  /  ALT  96<H>  /  AlkPhos  690<H>  06-20      Radiology:             ROS:  Patient comfortable without distress  No SOB or chest pain  No palpitation  No abdominal pain, diarrhaea or constipation  No weakness of extremities  No skin changes or swelling of legs  Rest of the comprehensive ROS was negative  Vital Signs Last 24 Hrs  T(C): 36.5 (20 Jun 2022 04:12), Max: 36.5 (19 Jun 2022 20:59)  T(F): 97.7 (20 Jun 2022 04:12), Max: 97.7 (19 Jun 2022 20:59)  HR: 93 (20 Jun 2022 08:23) (93 - 99)  BP: 111/67 (20 Jun 2022 08:23) (99/61 - 111/67)  BP(mean): --  RR: 18 (20 Jun 2022 04:12) (18 - 18)  SpO2: 95% (20 Jun 2022 04:12) (92% - 95%)    Physical exam:  Patient alert and oriented  No distress  CVS: S1, S2 regular or murmur  Chest: bilateral breath sound without rales  Abdomen: soft, not tender, no organomegaly or masses  CNS: No focal neuro deficit  Musculoskeletal:  Normal range of motion  Skin: No rash    Assessment and Plan: Patient is a 76 year old male w/pmh significant for Afib on Eliquis , HTN ,  HLD , admitted for worsening shortness of breath over the several weeks PTA on 6/14/22  Patient reported previous unlimited exercise tolerance , however, over the past several weeks  he became increasingly dyspneic with exertion , and now  short of breath with minimal activity. Patient reported no chest pain , no palpitations ,  he had some lower extremity edema , no orthopnea and no PND. He reported no cough or fever. Patient was evaluated by his PMD and bloodwork  showed elevated liver tests and decreased renal function. Patient also reported being evaluated by cardiology and echocardiogram reportedly showed no major abnormalities.  Repeat lab  testing showed worsening of renal function and patient was referred to the hospital for further evaluation. Patient reports no dysuria. He reports no alcohol use.   Seen for MRCP findings suggestive of infiltrative neoplasm  Seen by renal and hepatology  PAST MEDICAL & SURGICAL HISTORY:  Hyperlipidemia      Atrial fibrillation      Hypertension      S/P hernia repair        Allergies    No Known Allergies    Intolerances      Social History:   , lives with wife   former smoker 2ppd x many years , quit about 25 years ago   no alcohol use (14 Jun 2022 03:05)    Medications:  acetaminophen     Tablet .. 650 milliGRAM(s) Oral every 8 hours PRN Temp greater or equal to 38C (100.4F), Mild Pain (1 - 3)  amLODIPine   Tablet 10 milliGRAM(s) Oral daily  chlorhexidine 2% Cloths 1 Application(s) Topical <User Schedule>  clotrimazole 1% Cream 1 Application(s) Topical two times a day  finasteride 5 milliGRAM(s) Oral daily  melatonin 3 milliGRAM(s) Oral at bedtime PRN Insomnia  ondansetron Injectable 4 milliGRAM(s) IV Push every 8 hours PRN Nausea and/or Vomiting  pantoprazole    Tablet 40 milliGRAM(s) Oral before breakfast  sodium bicarbonate 650 milliGRAM(s) Oral three times a day  sodium chloride 0.9%. 1000 milliLiter(s) IV Continuous <Continuous>    Labs:  CBC Full  -  ( 20 Jun 2022 07:19 )  WBC Count : 15.78 K/uL  RBC Count : 3.97 M/uL  Hemoglobin : 12.1 g/dL  Hematocrit : 37.2 %  Platelet Count - Automated : 224 K/uL  Mean Cell Volume : 93.7 fl  Mean Cell Hemoglobin : 30.5 pg  Mean Cell Hemoglobin Concentration : 32.5 gm/dL  Auto Neutrophil # : x  Auto Lymphocyte # : x  Auto Monocyte # : x  Auto Eosinophil # : x  Auto Basophil # : x  Auto Neutrophil % : x  Auto Lymphocyte % : x  Auto Monocyte % : x  Auto Eosinophil % : x  Auto Basophil % : x    06-20    132<L>  |  98  |  71<H>  ----------------------------<  94  5.4<H>   |  18<L>  |  2.84<H>    Ca    9.0      20 Jun 2022 07:22    TPro  6.7  /  Alb  3.3  /  TBili  2.6<H>  /  DBili  x   /  AST  143<H>  /  ALT  96<H>  /  AlkPhos  690<H>  06-20  Prothrombin Time and INR, Plasma in AM (06.20.22 @ 07:19)   Prothrombin Time, Plasma: 16.3: Activated Partial Thromboplastin Time: 37.3: The recommended therapeutic heparin range (full dose) is 58-99 seconds.   Argatroban range is 1.5 to 3.0 times of the baseline APTT value, not to   exceed 100 seconds.   Routine coagulation results should be interpreted with caution when   taking Factor Xa inhibitors or direct thrombin inhibitors; blood sampling   prior to drug intake is recommended. sec (06.13.22 @ 16:51) Activated Partial Thromboplastin Time: 29.6: The recommended therapeutic heparin range (full dose) is 58-99 seconds.   Argatroban range is 1.5 to 3.0 times of the baseline APTT value, not to   exceed 100 seconds.   Routine coagulation results should be interpreted with caution when   taking Factor Xa inhibitors or direct thrombin inhibitors; blood sampling   prior to drug intake is recommended. sec (06.19.22 @ 07:31)   Prothrombin Time and INR, Plasma in AM (06.17.22 @ 06:18)   Prothrombin Time, Plasma: 19.3: Effective February 23,2022,     Radiology:             ROS:  Patient comfortable without distress  No SOB or chest pain  No palpitation  No abdominal pain, diarrhaea or constipation  No weakness of extremities  No skin changes or swelling of legs  Rest of the comprehensive ROS was negative  Vital Signs Last 24 Hrs  T(C): 36.5 (20 Jun 2022 04:12), Max: 36.5 (19 Jun 2022 20:59)  T(F): 97.7 (20 Jun 2022 04:12), Max: 97.7 (19 Jun 2022 20:59)  HR: 93 (20 Jun 2022 08:23) (93 - 99)  BP: 111/67 (20 Jun 2022 08:23) (99/61 - 111/67)  BP(mean): --  RR: 18 (20 Jun 2022 04:12) (18 - 18)  SpO2: 95% (20 Jun 2022 04:12) (92% - 95%)    Physical exam:  Patient alert and oriented  No distress  CVS: S1, S2 regular or murmur  Chest: bilateral breath sound without rales  Abdomen: soft, not tender, no organomegaly or masses  CNS: No focal neuro deficit  Musculoskeletal:  Normal range of motion  Skin: No rash    Assessment and Plan:

## 2022-06-20 NOTE — PROGRESS NOTE ADULT - ASSESSMENT
75yo M with PMH of Afib (on Eliquis), HTN, HLD admitted with worsening dyspnea. Seen for imaging suggestive of cirrhosis/infiltrative disease. Has elevated LFT' and ROBERT on ? CKD, seen by nephrology  Cirrhosis could be ETOH, ARTHUR, Cardiac etc, benitez being done  Hep profile negative, tumor markers except for Ca 19-9 normal   Diagnostic paracentesis done, follow results  < from: MR Abdomen w/ IV Cont (06.17.22 @ 11:15) >  IMPRESSION:  Diffusely infiltrated masses in the liver which may represent metastatic   disease or multifocal HCC. Left portal vein is not visualized and may be   involved. 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 Soft   tissue density in the left peritoneal cavity may represent additional   areas of disease and can be better evaluated on CT.    PT had downward trend on follow up after dc Eliquis      Liver bx with paracentesis planned for 6/21/22.   77yo M with PMH of Afib (on Eliquis), HTN, HLD admitted with worsening dyspnea. Seen for imaging suggestive of cirrhosis/infiltrative disease. Has elevated LFT' and ROBETR on ? CKD, seen by nephrology  Cirrhosis could be ETOH, ARTHUR, Cardiac etc, benitez being done  Hep profile negative, tumor markers except for Ca 19-9 normal   Diagnostic paracentesis done, follow results  < from: MR Abdomen w/ IV Cont (06.17.22 @ 11:15) >  IMPRESSION:  Diffusely infiltrated masses in the liver which may represent metastatic   disease or multifocal HCC. Left portal vein is not visualized and may be   involved. 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 Soft   tissue density in the left peritoneal cavity may represent additional   areas of disease and can be better evaluated on CT.    P, PTT had downward trend on follow up after dc Eliquis  Liver bx with paracentesis planned for 6/21/22.  Management planning will depend on the result of bx

## 2022-06-20 NOTE — PROGRESS NOTE ADULT - SUBJECTIVE AND OBJECTIVE BOX
Patient is a 76y old  Male who presents with a chief complaint of ROBERT and elevated liver tests (2022 13:36)      SUBJECTIVE / OVERNIGHT EVENTS:    Events noted.  CONSTITUTIONAL: No fever,  or fatigue  RESPIRATORY: No cough, wheezing,  No shortness of breath  CARDIOVASCULAR: No chest pain, palpitations, dizziness, or leg swelling  GASTROINTESTINAL: Distended      MEDICATIONS  (STANDING):  albumin human 25% IVPB 100 milliLiter(s) IV Intermittent every 6 hours  amLODIPine   Tablet 10 milliGRAM(s) Oral daily  chlorhexidine 2% Cloths 1 Application(s) Topical <User Schedule>  clotrimazole 1% Cream 1 Application(s) Topical two times a day  finasteride 5 milliGRAM(s) Oral daily  pantoprazole    Tablet 40 milliGRAM(s) Oral before breakfast  sodium bicarbonate 650 milliGRAM(s) Oral three times a day    MEDICATIONS  (PRN):  acetaminophen     Tablet .. 650 milliGRAM(s) Oral every 8 hours PRN Temp greater or equal to 38C (100.4F), Mild Pain (1 - 3)  melatonin 3 milliGRAM(s) Oral at bedtime PRN Insomnia  ondansetron Injectable 4 milliGRAM(s) IV Push every 8 hours PRN Nausea and/or Vomiting        CAPILLARY BLOOD GLUCOSE        I&O's Summary    2022 07:  -  2022 07:00  --------------------------------------------------------  IN: 360 mL / OUT: 250 mL / NET: 110 mL    2022 07:01  -  2022 22:04  --------------------------------------------------------  IN: 240 mL / OUT: 330 mL / NET: -90 mL        T(C): 36.4 (22 @ 20:42), Max: 36.6 (22 @ 14:33)  HR: 95 (22 @ 20:42) (93 - 118)  BP: 118/75 (22 @ 20:42) (99/65 - 118/75)  RR: 18 (22 @ 20:42) (18 - 18)  SpO2: 94% (22 @ 20:42) (94% - 97%)    PHYSICAL EXAM:    NECK: Supple, No JVD  CHEST/LUNG: Clear to auscultation bilaterally; No wheezing.  HEART: Regular rate and rhythm; No murmurs, rubs, or gallops  ABDOMEN: Soft, Distended  EXTREMITIES:   No edema  NEUROLOGY: AAO X 3      LABS:                        12.1   15.78 )-----------( 224      ( 2022 07:19 )             37.2         132<L>  |  98  |  71<H>  ----------------------------<  94  5.4<H>   |  18<L>  |  2.84<H>    Ca    9.0      2022 07:22    TPro  6.7  /  Alb  3.3  /  TBili  2.6<H>  /  DBili  x   /  AST  143<H>  /  ALT  96<H>  /  AlkPhos  690<H>  06-20    PT/INR - ( 2022 07:19 )   PT: 16.3 sec;   INR: 1.40 ratio         PTT - ( 2022 07:31 )  PTT:29.6 sec      Urinalysis Basic - ( 2022 14:03 )    Color: Yellow / Appearance: Slightly Turbid / S.021 / pH: x  Gluc: x / Ketone: Negative  / Bili: Small / Urobili: 3 mg/dL   Blood: x / Protein: 30 mg/dL / Nitrite: Negative   Leuk Esterase: Negative / RBC: 5 /hpf / WBC 3 /HPF   Sq Epi: x / Non Sq Epi: 1 /hpf / Bacteria: Negative      CAPILLARY BLOOD GLUCOSE            RADIOLOGY & ADDITIONAL TESTS:    Imaging Personally Reviewed:    Consultant(s) Notes Reviewed:      Care Discussed with Consultants/Other Providers:    Matheus Clarke MD, CMD, FACP    [id number] [id number]  Office Tel: [phone]  Cell: [phone]

## 2022-06-20 NOTE — PROGRESS NOTE ADULT - ASSESSMENT
77yo M with PMH of Afib (on Eliquis), HTN, HLD who presents with worsening dyspnea. Hepatology consulted for cirrhosis and elevated liver tests.    # Elevated liver tests - Noted infiltrative disease on MRI (w/o contrast) - ddx includes HCC vs. metastatic disease  # ROBERT - on CKD, unclear baseline, urine lytes not consistent with HRS/pre-renal  # Cirrhosis - MELD 31. Unclear etiology, suspect ARTHUR cirrhosis vs. cardiac vs. other.  Ascites - minimal  Varices - unclear  HCC - unknown  PSE - non on PE  # Afib - on eliquis    Recommendations:  - Albumin 25% 100cc q8h for 48h  - recommend liver biopsy per IR   - MRI abdomen with contrast reveals Diffusely infiltrated masses in the liver which may represent metastatic disease or multifocal HCC  - hold statin  - Not a transplant candidate given age    Thank you for involving us in the care of this patient. Please reach out if any further questions.    Theo Mclain, PGY-5  Hepatology Fellow    Available on [employer] Teams  Pager [phone] ([facility]) or [phone] ([facility])  After 5PM/Weekends, please contact the on-call GI fellow: [phone]  Available through [employer] Teams

## 2022-06-20 NOTE — PROVIDER CONTACT NOTE (OTHER) - BACKGROUND
76 M w/pmh significant for Afib on Eliquis , HTN ,  HLD , p/w HERNANDEZ x few weeks , labs notable for ROBERT and elevated liver tests.

## 2022-06-20 NOTE — PROGRESS NOTE ADULT - SUBJECTIVE AND OBJECTIVE BOX
Patient seen and examined  no complaints    No Known Allergies    Hospital Medications:   MEDICATIONS  (STANDING):  amLODIPine   Tablet 10 milliGRAM(s) Oral daily  chlorhexidine 2% Cloths 1 Application(s) Topical <User Schedule>  clotrimazole 1% Cream 1 Application(s) Topical two times a day  finasteride 5 milliGRAM(s) Oral daily  pantoprazole    Tablet 40 milliGRAM(s) Oral before breakfast  sodium bicarbonate 650 milliGRAM(s) Oral three times a day  sodium chloride 0.9%. 1000 milliLiter(s) (75 mL/Hr) IV Continuous <Continuous>  sodium zirconium cyclosilicate 10 Gram(s) Oral once      VITALS:  T(F): 97.5 (22 @ 11:56), Max: 97.7 (22 @ 20:59)  HR: 118 (22 @ 12:20)  BP: 99/65 (22 @ 11:56)  RR: 18 (22 @ 11:56)  SpO2: 97% (22 @ 12:20)  Wt(kg): --     @ 07:01  -   @ 07:00  --------------------------------------------------------  IN: 360 mL / OUT: 250 mL / NET: 110 mL     @ 07:01  -   @ 13:37  --------------------------------------------------------  IN: 0 mL / OUT: 100 mL / NET: -100 mL        PHYSICAL EXAM:  Constitutional: NAD  HEENT: anicteric sclera, oropharynx clear, MMM  Neck: No JVD  Respiratory: b/l rhonchi  Cardiovascular: S1, S2, RRR  Gastrointestinal: BS+, soft, NT/ND + ascites  Extremities: +peripheral edema  Neurological: A/O x 3, no focal deficits    LABS:      132<L>  |  98  |  71<H>  ----------------------------<  94  5.4<H>   |  18<L>  |  2.84<H>    Ca    9.0      2022 07:22    TPro  6.7  /  Alb  3.3  /  TBili  2.6<H>  /  DBili      /  AST  143<H>  /  ALT  96<H>  /  AlkPhos  690<H>      Creatinine Trend: 2.84 <--, 2.17 <--, 1.77 <--, 1.75 <--, 1.97 <--, 2.68 <--, 3.16 <--, 3.12 <--, 3.37 <--, 3.62 <--                        12.1   15.78 )-----------( 224      ( 2022 07:19 )             37.2     Urine Studies:  Urinalysis Basic - ( 15 Ernesto 2022 07:35 )    Color: Yellow / Appearance: Slightly Turbid / S.017 / pH:   Gluc:  / Ketone: Negative  / Bili: Negative / Urobili: 2 mg/dL   Blood:  / Protein: Trace / Nitrite: Negative   Leuk Esterase: Negative / RBC: 5 /hpf / WBC 0 /HPF   Sq Epi:  / Non Sq Epi: 0 /hpf / Bacteria: Negative      Potassium, Random Urine: 21 mmol/L (06-15 @ 07:35)  Osmolality, Random Urine: 569 mos/kg (06-15 @ 07:35)  Sodium, Random Urine: 72 mmol/L (06-15 @ 07:35)  Chloride, Random Urine: 66 mmol/L (06-15 @ 07:35)  Creatinine, Random Urine: 81 mg/dL (06-15 @ 07:35)  Protein/Creatinine Ratio Calculation: 0.2 Ratio (06-15 @ 07:35)  Creatinine, Random Urine: 91 mg/dL ( @ 08:58)  Protein/Creatinine Ratio Calculation: 0.2 Ratio ( @ 08:58)  Creatinine, Random Urine: 91 mg/dL ( @ 08:58)    RADIOLOGY & ADDITIONAL STUDIES:

## 2022-06-20 NOTE — PROGRESS NOTE ADULT - ASSESSMENT
76 year old male w/pmh significant for Afib on Eliquis , HTN ,  HLD , presenting for worsening shortness of breath found to have ROBERT on ? CKD and hyperkalemia with metabolic acidosis      1) ROBERT on ?CKD  Cr now rising  again--> ddx includes pre renal vs atn  pt not aware of underlying kidney disease--> may have component of Hypertensive Nephropathy  Renal US --> right kidney 9.8cm and left kidney 9.9cm--> no hydro  keep off ARB and HCTZ  Bladder scan showed 584cc however after straight cath only 10cc of urine  will check  check urine na/cr/cl/k/osm/  ALbumin 25% q6h x 2 days  avoid nephrotoxins  trend bmp q12h       2) Hyperkalemia  high k--> possibly 2/2 dec forward flow  lokelma 10 grams x 1  albumin as above  renal diet  trend k q daily    3) Metabolic acidosis-  insetting of robert/ckd  c/w nabicarb 650mg po tid  trend bicarb        Dr Romero  [phone]

## 2022-06-20 NOTE — PROGRESS NOTE ADULT - SUBJECTIVE AND OBJECTIVE BOX
Interval Events:   - No acute events    Allergies:  No Known Allergies        Hospital Medications:  acetaminophen     Tablet .. 650 milliGRAM(s) Oral every 8 hours PRN  amLODIPine   Tablet 10 milliGRAM(s) Oral daily  chlorhexidine 2% Cloths 1 Application(s) Topical <User Schedule>  clotrimazole 1% Cream 1 Application(s) Topical two times a day  finasteride 5 milliGRAM(s) Oral daily  melatonin 3 milliGRAM(s) Oral at bedtime PRN  ondansetron Injectable 4 milliGRAM(s) IV Push every 8 hours PRN  pantoprazole    Tablet 40 milliGRAM(s) Oral before breakfast  sodium bicarbonate 650 milliGRAM(s) Oral three times a day  sodium chloride 0.9%. 1000 milliLiter(s) IV Continuous <Continuous>  sodium zirconium cyclosilicate 10 Gram(s) Oral once      PMHX/PSHX:  Hyperlipidemia    Atrial fibrillation    Hypertension    S/P hernia repair        Family history:  FH: emphysema (Mother)        ROS: As per HPI, otherwise 14-point ROS reviewed and negative.      PHYSICAL EXAM:   Vital Signs:  Vital Signs Last 24 Hrs  T(C): 36.4 (20 Jun 2022 11:56), Max: 36.5 (19 Jun 2022 20:59)  T(F): 97.5 (20 Jun 2022 11:56), Max: 97.7 (19 Jun 2022 20:59)  HR: 118 (20 Jun 2022 12:20) (93 - 118)  BP: 99/65 (20 Jun 2022 11:56) (99/65 - 111/67)  BP(mean): --  RR: 18 (20 Jun 2022 11:56) (18 - 18)  SpO2: 97% (20 Jun 2022 12:20) (92% - 97%)  Daily     Daily       06-19-22 @ 07:01  -  06-20-22 @ 07:00  --------------------------------------------------------  IN: 360 mL / OUT: 250 mL / NET: 110 mL    06-20-22 @ 07:01  -  06-20-22 @ 13:29  --------------------------------------------------------  IN: 0 mL / OUT: 100 mL / NET: -100 mL        GENERAL:  No acute distress  HEENT:  Normocephalic/atraumatic, no scleral icterus  CHEST:  No accessory muscle use  HEART:  Regular rate and rhythm  ABDOMEN:  Soft, non-tender, non-distended  EXTREMITIES: No cyanosis, clubbing, +1 edema bilaterally  SKIN:  No rash  NEURO:  Alert and oriented x 3, no asterixis      LABS:                        12.1   15.78 )-----------( 224      ( 20 Jun 2022 07:19 )             37.2     Mean Cell Volume: 93.7 fl (06-20-22 @ 07:19)    06-20    132<L>  |  98  |  71<H>  ----------------------------<  94  5.4<H>   |  18<L>  |  2.84<H>    Ca    9.0      20 Jun 2022 07:22    TPro  6.7  /  Alb  3.3  /  TBili  2.6<H>  /  DBili  x   /  AST  143<H>  /  ALT  96<H>  /  AlkPhos  690<H>  06-20    LIVER FUNCTIONS - ( 20 Jun 2022 07:22 )  Alb: 3.3 g/dL / Pro: 6.7 g/dL / ALK PHOS: 690 U/L / ALT: 96 U/L / AST: 143 U/L / GGT: x           PT/INR - ( 20 Jun 2022 07:19 )   PT: 16.3 sec;   INR: 1.40 ratio         PTT - ( 19 Jun 2022 07:31 )  PTT:29.6 sec                            12.1   15.78 )-----------( 224      ( 20 Jun 2022 07:19 )             37.2                         11.5   14.10 )-----------( 213      ( 19 Jun 2022 07:17 )             35.0                         11.8   14.89 )-----------( 231      ( 18 Jun 2022 07:24 )             36.3       Imaging:

## 2022-06-21 NOTE — PROGRESS NOTE ADULT - SUBJECTIVE AND OBJECTIVE BOX
Patient seen and examined  no complaints  in IR suite awaiting liver biopsy      No Known Allergies    Hospital Medications:   MEDICATIONS  (STANDING):  albumin human 25% IVPB 100 milliLiter(s) IV Intermittent every 6 hours  amLODIPine   Tablet 10 milliGRAM(s) Oral daily  chlorhexidine 2% Cloths 1 Application(s) Topical <User Schedule>  clotrimazole 1% Cream 1 Application(s) Topical two times a day  finasteride 5 milliGRAM(s) Oral daily  pantoprazole    Tablet 40 milliGRAM(s) Oral before breakfast  sodium bicarbonate 650 milliGRAM(s) Oral three times a day    VITALS:  T(F): 98 (22 @ 11:07), Max: 98 (22 @ 05:01)  HR: 108 (22 @ 11:07)  BP: 126/78 (22 @ 11:07)  RR: 18 (22 @ 11:07)  SpO2: 94% (22 @ 11:07)  Wt(kg): --     @ 07:01  -   @ 07:00  --------------------------------------------------------  IN: 240 mL / OUT: 680 mL / NET: -440 mL     @ 07:01  -   @ 12:49  --------------------------------------------------------  IN: 0 mL / OUT: 200 mL / NET: -200 mL        PHYSICAL EXAM:  Constitutional: NAD  HEENT: anicteric sclera, oropharynx clear, MMM  Neck: No JVD  Respiratory: b/l rhonchi  Cardiovascular: S1, S2, RRR  Gastrointestinal: BS+, soft, NT/ND + ascites  Extremities: +peripheral edema  Neurological: A/O x 3, no focal deficits    LABS:      133<L>  |  97  |  74<H>  ----------------------------<  75  4.6   |  19<L>  |  2.75<H>    Ca    8.9      2022 07:08    TPro  6.3  /  Alb  3.5  /  TBili  3.2<H>  /  DBili      /  AST  107<H>  /  ALT  72<H>  /  AlkPhos  624<H>      Creatinine Trend: 2.75 <--, 2.84 <--, 2.17 <--, 1.77 <--, 1.75 <--, 1.97 <--, 2.68 <--                        11.4   15.94 )-----------( 202      ( 2022 07:08 )             34.2     Urine Studies:  Urinalysis Basic - ( 2022 14:03 )    Color: Yellow / Appearance: Slightly Turbid / S.021 / pH:   Gluc:  / Ketone: Negative  / Bili: Small / Urobili: 3 mg/dL   Blood:  / Protein: 30 mg/dL / Nitrite: Negative   Leuk Esterase: Negative / RBC: 5 /hpf / WBC 3 /HPF   Sq Epi:  / Non Sq Epi: 1 /hpf / Bacteria: Negative      Potassium, Random Urine: 38 mmol/L ( @ 07:15)  Sodium, Random Urine: 17 mmol/L ( @ 07:15)  Chloride, Random Urine: <20 mmol/L ( @ 07:15)  Creatinine, Random Urine: 102 mg/dL ( @ 07:15)  Protein/Creatinine Ratio Calculation: 0.2 Ratio ( @ 07:15)  Osmolality, Random Urine: 428 mos/kg ( @ 14:03)  Potassium, Random Urine: 21 mmol/L (06-15 @ 07:35)  Osmolality, Random Urine: 569 mos/kg (06-15 @ 07:35)  Sodium, Random Urine: 72 mmol/L (06-15 @ 07:35)  Chloride, Random Urine: 66 mmol/L (06-15 @ 07:35)  Creatinine, Random Urine: 81 mg/dL (06-15 @ 07:35)  Protein/Creatinine Ratio Calculation: 0.2 Ratio (06-15 @ 07:35)    RADIOLOGY & ADDITIONAL STUDIES:

## 2022-06-21 NOTE — PROGRESS NOTE ADULT - SUBJECTIVE AND OBJECTIVE BOX
Patient is a 76y old  Male who presents with a chief complaint of ROBERT and elevated liver tests (2022 13:36)      SUBJECTIVE / OVERNIGHT EVENTS:    Events noted.  CONSTITUTIONAL: No fever,  or fatigue  RESPIRATORY: No cough, wheezing,  No shortness of breath  CARDIOVASCULAR: No chest pain, palpitations, dizziness, or leg swelling  GASTROINTESTINAL: Distended      MEDICATIONS  (STANDING):  albumin human 25% IVPB 100 milliLiter(s) IV Intermittent every 6 hours  amLODIPine   Tablet 10 milliGRAM(s) Oral daily  chlorhexidine 2% Cloths 1 Application(s) Topical <User Schedule>  clotrimazole 1% Cream 1 Application(s) Topical two times a day  finasteride 5 milliGRAM(s) Oral daily  pantoprazole    Tablet 40 milliGRAM(s) Oral before breakfast  sodium bicarbonate 650 milliGRAM(s) Oral three times a day    MEDICATIONS  (PRN):  acetaminophen     Tablet .. 650 milliGRAM(s) Oral every 8 hours PRN Temp greater or equal to 38C (100.4F), Mild Pain (1 - 3)  melatonin 3 milliGRAM(s) Oral at bedtime PRN Insomnia  ondansetron Injectable 4 milliGRAM(s) IV Push every 8 hours PRN Nausea and/or Vomiting        CAPILLARY BLOOD GLUCOSE        I&O's Summary    2022 07:  -  2022 07:00  --------------------------------------------------------  IN: 360 mL / OUT: 250 mL / NET: 110 mL    2022 07:01  -  2022 22:04  --------------------------------------------------------  IN: 240 mL / OUT: 330 mL / NET: -90 mL        T(C): 36.4 (22 @ 20:42), Max: 36.6 (22 @ 14:33)  HR: 95 (22 @ 20:42) (93 - 118)  BP: 118/75 (22 @ 20:42) (99/65 - 118/75)  RR: 18 (22 @ 20:42) (18 - 18)  SpO2: 94% (22 @ 20:42) (94% - 97%)    PHYSICAL EXAM:    NECK: Supple, No JVD  CHEST/LUNG: Clear to auscultation bilaterally; No wheezing.  HEART: Regular rate and rhythm; No murmurs, rubs, or gallops  ABDOMEN: Soft, Distended  EXTREMITIES:   No edema  NEUROLOGY: AAO X 3      LABS:                        12.1   15.78 )-----------( 224      ( 2022 07:19 )             37.2         132<L>  |  98  |  71<H>  ----------------------------<  94  5.4<H>   |  18<L>  |  2.84<H>    Ca    9.0      2022 07:22    TPro  6.7  /  Alb  3.3  /  TBili  2.6<H>  /  DBili  x   /  AST  143<H>  /  ALT  96<H>  /  AlkPhos  690<H>  06-20    PT/INR - ( 2022 07:19 )   PT: 16.3 sec;   INR: 1.40 ratio         PTT - ( 2022 07:31 )  PTT:29.6 sec      Urinalysis Basic - ( 2022 14:03 )    Color: Yellow / Appearance: Slightly Turbid / S.021 / pH: x  Gluc: x / Ketone: Negative  / Bili: Small / Urobili: 3 mg/dL   Blood: x / Protein: 30 mg/dL / Nitrite: Negative   Leuk Esterase: Negative / RBC: 5 /hpf / WBC 3 /HPF   Sq Epi: x / Non Sq Epi: 1 /hpf / Bacteria: Negative      CAPILLARY BLOOD GLUCOSE            RADIOLOGY & ADDITIONAL TESTS:    Imaging Personally Reviewed:    Consultant(s) Notes Reviewed:      Care Discussed with Consultants/Other Providers:    Matheus Clarke MD, CMD, FACP    [phone] [address]  Office Tel: [phone]  Cell: [phone]

## 2022-06-21 NOTE — PROGRESS NOTE ADULT - SUBJECTIVE AND OBJECTIVE BOX
Patient is a 76 year old male w/pmh significant for Afib on Eliquis , HTN ,  HLD , admitted for worsening shortness of breath over the several weeks PTA on 6/14/22  Patient reported previous unlimited exercise tolerance , however, over the past several weeks  he became increasingly dyspneic with exertion , and now  short of breath with minimal activity. Patient reported no chest pain , no palpitations ,  he had some lower extremity edema , no orthopnea and no PND. He reported no cough or fever. Patient was evaluated by his PMD and bloodwork  showed elevated liver tests and decreased renal function. Patient also reported being evaluated by cardiology and echocardiogram reportedly showed no major abnormalities.  Repeat lab  testing showed worsening of renal function and patient was referred to the hospital for further evaluation. Patient reports no dysuria. He reports no alcohol use.   Seen for MRCP findings suggestive of infiltrative neoplasm  Seen by renal and hepatology    PAST MEDICAL & SURGICAL HISTORY:  Hyperlipidemia      Atrial fibrillation      Hypertension      S/P hernia repair        Allergies    No Known Allergies    Intolerances      Social History:   , lives with wife   former smoker 2ppd x many years , quit about 25 years ago   no alcohol use (14 Jun 2022 03:05)    Medications:  acetaminophen     Tablet .. 650 milliGRAM(s) Oral every 8 hours PRN Temp greater or equal to 38C (100.4F), Mild Pain (1 - 3)  albumin human 25% IVPB 100 milliLiter(s) IV Intermittent every 6 hours  amLODIPine   Tablet 10 milliGRAM(s) Oral daily  chlorhexidine 2% Cloths 1 Application(s) Topical <User Schedule>  clotrimazole 1% Cream 1 Application(s) Topical two times a day  finasteride 5 milliGRAM(s) Oral daily  melatonin 3 milliGRAM(s) Oral at bedtime PRN Insomnia  ondansetron Injectable 4 milliGRAM(s) IV Push every 8 hours PRN Nausea and/or Vomiting  pantoprazole    Tablet 40 milliGRAM(s) Oral before breakfast  sodium bicarbonate 650 milliGRAM(s) Oral three times a day    Labs:  CBC Full  -  ( 21 Jun 2022 07:08 )  WBC Count : 15.94 K/uL  RBC Count : 3.71 M/uL  Hemoglobin : 11.4 g/dL  Hematocrit : 34.2 %  Platelet Count - Automated : 202 K/uL  Mean Cell Volume : 92.2 fl  Mean Cell Hemoglobin : 30.7 pg  Mean Cell Hemoglobin Concentration : 33.3 gm/dL  Auto Neutrophil # : x  Auto Lymphocyte # : x  Auto Monocyte # : x  Auto Eosinophil # : x  Auto Basophil # : x  Auto Neutrophil % : x  Auto Lymphocyte % : x  Auto Monocyte % : x  Auto Eosinophil % : x  Auto Basophil % : x    06-21    133<L>  |  97  |  74<H>  ----------------------------<  75  4.6   |  19<L>  |  2.75<H>    Ca    8.9      21 Jun 2022 07:08    TPro  6.3  /  Alb  3.5  /  TBili  3.2<H>  /  DBili  x   /  AST  107<H>  /  ALT  72<H>  /  AlkPhos  624<H>  06-21      Radiology:             ROS:  Patient comfortable without distress  No SOB or chest pain  No palpitation  No abdominal pain, diarrhaea or constipation  No weakness of extremities  No skin changes or swelling of legs  Rest of the comprehensive ROS was negative  Vital Signs Last 24 Hrs  T(C): 36.7 (21 Jun 2022 13:36), Max: 36.7 (21 Jun 2022 05:01)  T(F): 98 (21 Jun 2022 11:07), Max: 98 (21 Jun 2022 05:01)  HR: 108 (21 Jun 2022 13:36) (95 - 108)  BP: 126/78 (21 Jun 2022 13:36) (114/72 - 126/78)  BP(mean): --  RR: 18 (21 Jun 2022 13:36) (18 - 18)  SpO2: 94% (21 Jun 2022 13:36) (94% - 95%)    Physical exam:  Patient alert and oriented  No distress  CVS: S1, S2   Chest: bilateral breath sound without rales  Abdomen: soft, not tender, no organomegaly or masses  CNS: No focal neuro deficit  Musculoskeletal:  Normal range of motion  Skin: No rash    Assessment and Plan:Height (cm): 175.3 (06-21 @ 13:36)  Weight (kg): 90.3 (06-21 @ 13:36)  BMI (kg/m2): 29.4 (06-21 @ 13:36)  BSA (m2): 2.06 (06-21 @ 13:36)

## 2022-06-21 NOTE — PRE PROCEDURE NOTE - PRE PROCEDURE EVALUATION
Interventional Radiology    HPI: 76y Male with a-fib on eliquis, HTN/HLD, admitted with worsening dyspnea and found to have cirrhosis. MRI shows extensive bi-lobar infiltrative hepatic neoplasm. IR will perform a paracentesis and liver biopsy.     Allergies:   Medications (Abx/Cardiac/Anticoagulation/Blood Products)  amLODIPine   Tablet: 10 milliGRAM(s) Oral (06-21 @ 06:12)    Data:    T(C): 36.7  HR: 108  BP: 126/78  RR: 18  SpO2: 94%    Exam  General: No acute distress  Chest: Non labored breathing  Abdomen: Non-distended  Extremities: No swelling, warm    -WBC 15.94 / HgB 11.4 / Hct 34.2 / Plt 202  -Na 133 / Cl 97 / BUN 74 / Glucose 75  -K 4.6 / CO2 19 / Cr 2.75  -ALT 72 / Alk Phos 624 / T.Bili 3.2  -INR1.52    Plan:   76y Male with a-fib on eliquis, HTN/HLD, admitted with worsening dyspnea and found to have cirrhosis. MRI shows extensive bi-lobar infiltrative hepatic neoplasm. IR will perform a paracentesis and liver biopsy.   -- Relevant imaging and labs were reviewed.   -- Risks, benefits, and alternatives were explained to the patient and informed consent was obtained.

## 2022-06-21 NOTE — PRE-ANESTHESIA EVALUATION ADULT - NSANTHPMHFT_GEN_ALL_CORE
HPI: 77yo M with PMH of Afib (on Eliquis), HTN, HLD admitted with worsening dyspnea found to have cirrhosis, ascites s/p Paracentesis with 260ML removal  and MRI with  extensive bilobar infiltrative hepatic neoplasm. IR consulted for liver biopsy.

## 2022-06-21 NOTE — PROGRESS NOTE ADULT - SUBJECTIVE AND OBJECTIVE BOX
Interval Events:   - No acute events  - Planned for biopsy today    Allergies:  No Known Allergies        Hospital Medications:  acetaminophen     Tablet .. 650 milliGRAM(s) Oral every 8 hours PRN  albumin human 25% IVPB 100 milliLiter(s) IV Intermittent every 6 hours  amLODIPine   Tablet 10 milliGRAM(s) Oral daily  chlorhexidine 2% Cloths 1 Application(s) Topical <User Schedule>  clotrimazole 1% Cream 1 Application(s) Topical two times a day  finasteride 5 milliGRAM(s) Oral daily  melatonin 3 milliGRAM(s) Oral at bedtime PRN  ondansetron Injectable 4 milliGRAM(s) IV Push every 8 hours PRN  pantoprazole    Tablet 40 milliGRAM(s) Oral before breakfast  sodium bicarbonate 650 milliGRAM(s) Oral three times a day      PMHX/PSHX:  Hyperlipidemia    Atrial fibrillation    Hypertension    S/P hernia repair        Family history:  FH: emphysema (Mother)        ROS: As per HPI, otherwise 14-point ROS reviewed and negative.      PHYSICAL EXAM:   Vital Signs:  Vital Signs Last 24 Hrs  T(C): 36.7 (2022 11:07), Max: 36.7 (2022 05:01)  T(F): 98 (2022 11:07), Max: 98 (2022 05:01)  HR: 108 (2022 11:07) (95 - 118)  BP: 126/78 (2022 11:07) (99/65 - 126/78)  BP(mean): --  RR: 18 (2022 11:07) (18 - 18)  SpO2: 94% (2022 11:07) (94% - 97%)  Daily     Daily       22 @ 07:01  -  22 @ 07:00  --------------------------------------------------------  IN: 240 mL / OUT: 680 mL / NET: -440 mL    22 @ 07:01  -  22 @ 11:39  --------------------------------------------------------  IN: 0 mL / OUT: 200 mL / NET: -200 mL        GENERAL:  No acute distress  HEENT:  Normocephalic/atraumatic, no scleral icterus  CHEST:  No accessory muscle use  HEART:  Regular rate and rhythm  ABDOMEN:  Soft, non-tender, non-distended  EXTREMITIES: No cyanosis, clubbing, +1 edema bilaterally  SKIN:  No rash  NEURO:  Alert and oriented x 3, no asterixis      LABS:                        11.4   15.94 )-----------(       ( 2022 07:08 )             34.2     Mean Cell Volume: 92.2 fl (22 @ 07:08)        133<L>  |  97  |  74<H>  ----------------------------<  75  4.6   |  19<L>  |  2.75<H>    Ca    8.9      2022 07:08    TPro  6.3  /  Alb  3.5  /  TBili  3.2<H>  /  DBili  x   /  AST  107<H>  /  ALT  72<H>  /  AlkPhos  624<H>      LIVER FUNCTIONS - ( 2022 07:08 )  Alb: 3.5 g/dL / Pro: 6.3 g/dL / ALK PHOS: 624 U/L / ALT: 72 U/L / AST: 107 U/L / GGT: x           PT/INR - ( 2022 07:08 )   PT: 17.6 sec;   INR: 1.52 ratio           Urinalysis Basic - ( 2022 14:03 )    Color: Yellow / Appearance: Slightly Turbid / S.021 / pH: x  Gluc: x / Ketone: Negative  / Bili: Small / Urobili: 3 mg/dL   Blood: x / Protein: 30 mg/dL / Nitrite: Negative   Leuk Esterase: Negative / RBC: 5 /hpf / WBC 3 /HPF   Sq Epi: x / Non Sq Epi: 1 /hpf / Bacteria: Negative                              11.4   15.94 )-----------(       ( 2022 07:08 )             34.2                         12.1   15.78 )-----------( 224      ( 2022 07:19 )             37.2                         11.5   14.10 )-----------( 213      ( 2022 07:17 )             35.0       Imaging:

## 2022-06-21 NOTE — PROCEDURE NOTE - PROCEDURE FINDINGS AND DETAILS
paracentesis performed in the left lower quadrant and right upper quadrant ; 3000mL of straw-colored ascites drained and sent to lab for analysis.  right hepatic lobe 20G FNA & 20G core needle biopsies performed.   patient tolerated the procedure well.

## 2022-06-21 NOTE — PROGRESS NOTE ADULT - ASSESSMENT
76 year old male w/pmh significant for Afib on Eliquis , HTN ,  HLD , presenting for worsening shortness of breath found to have ROBERT on ? CKD and hyperkalemia with metabolic acidosis      1) ROBERT on ?CKD  Cr now rising  again--> ddx includes pre renal vs atn vs r/o HRS  pt not aware of underlying kidney disease--> may have component of Hypertensive Nephropathy  Renal US --> right kidney 9.8cm and left kidney 9.9cm--> no hydro  keep off ARB and HCTZ  Bladder scan showed 584cc however after straight cath only 10cc of urine  Urine lytes reviewed- intravascularly depleted  ALbumin 25% q6h x 2 days ( today is day #2)  avoid nephrotoxins  trend bmp q12h       2) Hyperkalemia  high k--> possibly 2/2 dec forward flow  s/p lokelma 10 grams x 1  on albumin as above  k improving  renal diet  trend k q daily    3) Metabolic acidosis-  insetting of robert/ckd  c/w nabicarb 650mg po tid  trend bicarb        Dr Romero  [phone]

## 2022-06-21 NOTE — PROGRESS NOTE ADULT - ASSESSMENT
75yo M with PMH of Afib (on Eliquis), HTN, HLD admitted with worsening dyspnea. Seen for imaging suggestive of cirrhosis/infiltrative disease. Has elevated LFT' and ROBERT on ? CKD, seen by nephrology  Cirrhosis could be ETOH, ARTHUR, Cardiac etc, benitez being done  Hep profile negative, tumor markers except for Ca 19-9 normal   Diagnostic paracentesis done, follow results  < from: MR Abdomen w/ IV Cont (06.17.22 @ 11:15) >  IMPRESSION:  Diffusely infiltrated masses in the liver which may represent metastatic   disease or multifocal HCC. Left portal vein is not visualized and may be   involved. 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 Soft   tissue density in the left peritoneal cavity may represent additional   areas of disease and can be better evaluated on CT.    PT, PTT had downward trend on follow up after dc Eliquis  Liver bx with paracentesis for 6/21/22, in proess  Management planning will depend on the result of bx

## 2022-06-21 NOTE — PROGRESS NOTE ADULT - ASSESSMENT
75yo M with PMH of Afib (on Eliquis), HTN, HLD who presents with worsening dyspnea. Hepatology consulted for cirrhosis and elevated liver tests.    # Elevated liver tests - Noted infiltrative disease on MRI (w/o contrast) - ddx includes HCC vs. metastatic disease  # ROBERT - on CKD, unclear baseline, urine lytes not consistent with HRS/pre-renal  # Cirrhosis - MELD 31. Unclear etiology, suspect ARTHUR cirrhosis vs. cardiac vs. other.  Ascites - minimal  Varices - unclear  HCC - unknown  PSE - non on PE  # Afib - on eliquis    Recommendations:  - Albumin 25% 100cc q8h for 48h  - recommend liver biopsy per IR   - MRI abdomen with contrast reveals Diffusely infiltrated masses in the liver which may represent metastatic disease or multifocal HCC  - hold statin  - Not a transplant candidate given age    Thank you for involving us in the care of this patient. Please reach out if any further questions.    Theo Mclain, PGY-5  Hepatology Fellow    Available on [employer] Teams  Pager [phone] ([facility]) or [phone] ([facility])  After 5PM/Weekends, please contact the on-call GI fellow: [phone]  Available through [employer] Teams   77yo M with PMH of Afib (on Eliquis), HTN, HLD who presents with worsening dyspnea. Hepatology consulted for cirrhosis and elevated liver tests.    # Elevated liver tests - Noted infiltrative disease on MRI (w/o contrast) - ddx includes HCC vs. metastatic disease  # ROBERT - on CKD, unclear baseline, urine lytes not consistent with HRS/pre-renal  # Cirrhosis - MELD 31. Unclear etiology, suspect ARTHUR cirrhosis vs. cardiac vs. other.  Ascites - minimal  Varices - unclear  HCC - unknown  PSE - non on PE  # Afib - on eliquis    Recommendations:  - Albumin 25% 100cc q8h for 48h  - recommend liver biopsy per IR   - MRI abdomen with contrast reveals Diffusely infiltrated masses in the liver which may represent metastatic disease or multifocal HCC  - hold statin    Thank you for involving us in the care of this patient. Please reach out if any further questions.    Theo Mclain, PGY-5  Hepatology Fellow    Available on [employer] Teams  Pager [phone] ([facility]) or [phone] ([facility])  After 5PM/Weekends, please contact the on-call GI fellow: [phone]  Available through [employer] Teams   75yo M with PMH of Afib (on Eliquis), HTN, HLD who presents with worsening dyspnea. Hepatology consulted for cirrhosis and elevated liver tests.    # Elevated liver tests - Noted infiltrative disease on MRI (w/o contrast) - ddx includes HCC vs. metastatic disease  # ROBERT - on CKD, unclear baseline, pre-renal vs. HRS?  # Cirrhosis - MELD 31. Unclear etiology, suspect ARTHUR cirrhosis vs. cardiac vs. other.  Ascites - minimal  Varices - unclear  HCC - unknown  PSE - non on PE  # Afib - on eliquis    Recommendations:  - Albumin 25% 100cc q8h for 48h  - recommend liver biopsy per IR   - MRI abdomen with contrast reveals Diffusely infiltrated masses in the liver which may represent metastatic disease or multifocal HCC  - hold statin    Thank you for involving us in the care of this patient. Please reach out if any further questions.    Theo Mclain, PGY-5  Hepatology Fellow    Available on [employer] Teams  Pager [phone] ([facility]) or [phone] ([facility])  After 5PM/Weekends, please contact the on-call GI fellow: [phone]  Available through [employer] Teams

## 2022-06-22 NOTE — PROGRESS NOTE ADULT - SUBJECTIVE AND OBJECTIVE BOX
Interventional Radiology Follow-Up Note.    Patient seen and examined @ bedside between 7 and 8am.    This is a 76y Male s/p LLQ paracentesis and right hepatic lobe aspiration and biopsy on 6/21/22 in Interventional Radiology with Dr. Kim.     No complaint offered.      Medication:  amLODIPine   Tablet: (06-22)    Vitals:  T(F): 97.8, Max: 97.8 (15:45)  HR: 95  BP: 118/73  RR: 19  SpO2: 93%    Physical Exam:  General: Nontoxic, in NAD.  Abdomen: soft, NTND. Rt biopsy site dressing c/d/i. Soft with no evidence of hematoma.  Previous para site dressing on Rt side c/d/i- removed. Left side para site dressing c/d/i- removed.          LABS:  Na: 137 (06-22 @ 07:10), 133 (06-21 @ 07:08), 132 (06-20 @ 07:22)  K: 5.2 (06-22 @ 07:10), 4.6 (06-21 @ 07:08), 5.4 (06-20 @ 07:22)  Cl: 100 (06-22 @ 07:10), 97 (06-21 @ 07:08), 98 (06-20 @ 07:22)  CO2: 20 (06-22 @ 07:10), 19 (06-21 @ 07:08), 18 (06-20 @ 07:22)  BUN: 72 (06-22 @ 07:10), 74 (06-21 @ 07:08), 71 (06-20 @ 07:22)  Cr: 2.41 (06-22 @ 07:10), 2.75 (06-21 @ 07:08), 2.84 (06-20 @ 07:22)  Glu: 116(06-22 @ 07:10), 75(06-21 @ 07:08), 94(06-20 @ 07:22)    Hgb: 11.4 (06-22 @ 07:26), 11.4 (06-21 @ 07:08), 12.1 (06-20 @ 07:19)  Hct: 34.7 (06-22 @ 07:26), 34.2 (06-21 @ 07:08), 37.2 (06-20 @ 07:19)  WBC: 16.91 (06-22 @ 07:26), 15.94 (06-21 @ 07:08), 15.78 (06-20 @ 07:19)  Plt: 177 (06-22 @ 07:26), 202 (06-21 @ 07:08), 224 (06-20 @ 07:19)    INR: 1.58 06-22-22 @ 07:30, 1.52 06-21-22 @ 07:08, 1.40 06-20-22 @ 07:19  PTT:           LIVER FUNCTIONS - ( 22 Jun 2022 07:10 )  Alb: 3.9 g/dL / Pro: 6.5 g/dL / ALK PHOS: 707 U/L / ALT: 69 U/L / AST: 120 U/L / GGT: x                     Bilirubin Total, Serum: 4.2 mg/dL (06-22-22 @ 07:10)    Aspartate Aminotransferase (AST/SGOT): 120 U/L (06-22-22 @ 07:10)  Alanine Aminotransferase (ALT/SGPT): 69 U/L (06-22-22 @ 07:10)  Aspartate Aminotransferase (AST/SGOT): 107 U/L (06-21-22 @ 07:08)  Alanine Aminotransferase (ALT/SGPT): 72 U/L (06-21-22 @ 07:08)      Bilirubin Total, Serum: 4.2 mg/dL *H* (06-22-22 @ 07:10)  Bilirubin Total, Serum: 3.2 mg/dL *H* (06-21-22 @ 07:08)  Bilirubin Total, Serum: 2.6 mg/dL *H* (06-20-22 @ 07:22)      Culture - Body Fluid with Gram Stain (collected 06-21-22 @ 17:34)  Source: Peritoneal Peritoneal Fluid  Gram Stain (06-22-22 @ 02:50):    polymorphonuclear leukocytes seen    No organisms seen    by cytocentrifuge    Culture - Body Fluid with Gram Stain (collected 06-17-22 @ 15:47)  Source: .Body Fluid Peritoneal Fluid  Gram Stain (06-18-22 @ 03:08):    polymorphonuclear leukocytes seen    No organisms seen    by cytocentrifuge  Preliminary Report (06-18-22 @ 18:22):    No growth          Assessment/Plan:  77yo M with PMH of Afib (on Eliquis), HTN, HLD admitted with worsening dyspnea found to have cirrhosis, ascites s/p Paracentesis with 260ML removal and MRI with extensive bilobar infiltrative hepatic neoplasm.  Pt most recently s/p LLQ paracentesis and right hepatic lobe aspiration and biopsy on 6/21/22 in Interventional Radiology.     - F/u cytopathology results  - OK to remove biopsy dressing tomorrow 6/23  - Trend vs/labs  - Continue global management per primary team  - IR will sign off     Please call IR at [phone] with any questions, concerns, or issues regarding above.    Also available on [employer] TEAMS.

## 2022-06-22 NOTE — PROGRESS NOTE ADULT - ASSESSMENT
76 year old male w/pmh significant for Afib on Eliquis , HTN ,  HLD , presenting for worsening shortness of breath found to have ROBERT on ? CKD and hyperkalemia with metabolic acidosis      1) ROBERT on ?CKD  Cr now rising  again--> ddx includes pre renal vs atn vs r/o HRS  pt not aware of underlying kidney disease--> may have component of Hypertensive Nephropathy  Renal US --> right kidney 9.8cm and left kidney 9.9cm--> no hydro  keep off ARB and HCTZ  Bladder scan showed 584cc however after straight cath only 10cc of urine  Urine lytes reviewed- intravascularly depleted  ALbumin 25% q6h x 2 days--> pt to finish today--->however given paracentesis yesterday ( 3 Liters)--> Pt likely to benefit from 2 more days of albumin  avoid nephrotoxins  trend bmp q dailky      2) Hyperkalemia  high k--> possibly 2/2 dec forward flow  s/p lokelma 10 grams x 1  on albumin as above  k rising-- will give another dose of lokelma  renal diet  trend k q daily    3) Metabolic acidosis-  insetting of robert/ckd  c/w nabicarb 650mg po tid  trend bicarb        Dr Romero  [phone]

## 2022-06-22 NOTE — PROGRESS NOTE ADULT - SUBJECTIVE AND OBJECTIVE BOX
HPI:  Patient is a 76 year old male w/pmh significant for Afib on Eliquis , HTN ,  HLD , admitted for worsening shortness of breath over the several weeks PTA on 6/14/22  Patient reported previous unlimited exercise tolerance , however, over the past several weeks  he became increasingly dyspneic with exertion , and now  short of breath with minimal activity. Patient reported no chest pain , no palpitations ,  he had some lower extremity edema , no orthopnea and no PND. He reported no cough or fever. Patient was evaluated by his PMD and bloodwork  showed elevated liver tests and decreased renal function. Patient also reported being evaluated by cardiology and echocardiogram reportedly showed no major abnormalities.  Repeat lab  testing showed worsening of renal function and patient was referred to the hospital for further evaluation. Patient reports no dysuria. He reports no alcohol use.   Seen for MRCP findings suggestive of infiltrative neoplasm  Seen by renal and hepatology  S/P paracentesis and liver bx by IR 6/21/22  PAST MEDICAL & SURGICAL HISTORY:  Hyperlipidemia      Atrial fibrillation      Hypertension      S/P hernia repair        Allergies    No Known Allergies    Intolerances      Social History:   , lives with wife   former smoker 2ppd x many years , quit about 25 years ago   no alcohol use (14 Jun 2022 03:05)    Medications:  acetaminophen     Tablet .. 650 milliGRAM(s) Oral every 8 hours PRN Temp greater or equal to 38C (100.4F), Mild Pain (1 - 3)  albumin human 25% IVPB 100 milliLiter(s) IV Intermittent every 6 hours  amLODIPine   Tablet 10 milliGRAM(s) Oral daily  chlorhexidine 2% Cloths 1 Application(s) Topical <User Schedule>  clotrimazole 1% Cream 1 Application(s) Topical two times a day  finasteride 5 milliGRAM(s) Oral daily  melatonin 3 milliGRAM(s) Oral at bedtime PRN Insomnia  ondansetron Injectable 4 milliGRAM(s) IV Push every 8 hours PRN Nausea and/or Vomiting  pantoprazole    Tablet 40 milliGRAM(s) Oral before breakfast  sodium bicarbonate 650 milliGRAM(s) Oral three times a day    Labs:  CBC Full  -  ( 22 Jun 2022 07:26 )  WBC Count : 16.91 K/uL  RBC Count : 3.77 M/uL  Hemoglobin : 11.4 g/dL  Hematocrit : 34.7 %  Platelet Count - Automated : 177 K/uL  Mean Cell Volume : 92.0 fl  Mean Cell Hemoglobin : 30.2 pg  Mean Cell Hemoglobin Concentration : 32.9 gm/dL  Auto Neutrophil # : x  Auto Lymphocyte # : x  Auto Monocyte # : x  Auto Eosinophil # : x  Auto Basophil # : x  Auto Neutrophil % : x  Auto Lymphocyte % : x  Auto Monocyte % : x  Auto Eosinophil % : x  Auto Basophil % : x    06-22    137  |  100  |  72<H>  ----------------------------<  116<H>  5.2   |  20<L>  |  2.41<H>    Ca    9.3      22 Jun 2022 07:10    TPro  6.5  /  Alb  3.9  /  TBili  4.2<H>  /  DBili  x   /  AST  120<H>  /  ALT  69<H>  /  AlkPhos  707<H>  06-22      Radiology:             ROS:  Patient comfortable without distress  No SOB or chest pain  No palpitation  No abdominal pain, diarrhaea or constipation  No weakness of extremities  No skin changes or swelling of legs  Rest of the comprehensive ROS was negative  Vital Signs Last 24 Hrs  T(C): 36.6 (22 Jun 2022 05:29), Max: 36.7 (21 Jun 2022 11:07)  T(F): 97.8 (22 Jun 2022 05:29), Max: 98 (21 Jun 2022 11:07)  HR: 95 (22 Jun 2022 05:29) (90 - 108)  BP: 118/73 (22 Jun 2022 05:29) (110/62 - 126/78)  BP(mean): --  RR: 19 (22 Jun 2022 05:29) (16 - 19)  SpO2: 93% (22 Jun 2022 05:29) (93% - 97%)    Physical exam:  Patient alert and oriented  No distress  CVS: S1, S2 regular or murmur  Chest: bilateral breath sound without rales  Abdomen: soft, not tender, no organomegaly or masses  CNS: No focal neuro deficit  Musculoskeletal:  Normal range of motion  Skin: No rash    Assessment and Plan:Height (cm): 175.3 (06-21 @ 13:36)  Weight (kg): 90.3 (06-21 @ 13:36)  BMI (kg/m2): 29.4 (06-21 @ 13:36)  BSA (m2): 2.06 (06-21 @ 13:36) HPI:  Patient is a 76 year old male w/pmh significant for Afib on Eliquis , HTN ,  HLD , admitted for worsening shortness of breath over the several weeks PTA on 6/14/22  Patient reported previous unlimited exercise tolerance , however, over the past several weeks  he became increasingly dyspneic with exertion , and now  short of breath with minimal activity. Patient reported no chest pain , no palpitations ,  he had some lower extremity edema , no orthopnea and no PND. He reported no cough or fever. Patient was evaluated by his PMD and bloodwork  showed elevated liver tests and decreased renal function. Patient also reported being evaluated by cardiology and echocardiogram reportedly showed no major abnormalities.  Repeat lab  testing showed worsening of renal function and patient was referred to the hospital for further evaluation. Patient reports no dysuria. He reports no alcohol use.   Seen for MRCP findings suggestive of infiltrative neoplasm  Seen by renal and hepatology  S/P paracentesis and liver bx by IR 6/21/22  PAST MEDICAL & SURGICAL HISTORY:  Hyperlipidemia      Atrial fibrillation      Hypertension      S/P hernia repair        Allergies    No Known Allergies    Intolerances      Social History:   , lives with wife   former smoker 2ppd x many years , quit about 25 years ago   no alcohol use (14 Jun 2022 03:05)    Medications:  acetaminophen     Tablet .. 650 milliGRAM(s) Oral every 8 hours PRN Temp greater or equal to 38C (100.4F), Mild Pain (1 - 3)  albumin human 25% IVPB 100 milliLiter(s) IV Intermittent every 6 hours  amLODIPine   Tablet 10 milliGRAM(s) Oral daily  chlorhexidine 2% Cloths 1 Application(s) Topical <User Schedule>  clotrimazole 1% Cream 1 Application(s) Topical two times a day  finasteride 5 milliGRAM(s) Oral daily  melatonin 3 milliGRAM(s) Oral at bedtime PRN Insomnia  ondansetron Injectable 4 milliGRAM(s) IV Push every 8 hours PRN Nausea and/or Vomiting  pantoprazole    Tablet 40 milliGRAM(s) Oral before breakfast  sodium bicarbonate 650 milliGRAM(s) Oral three times a day    Labs:  CBC Full  -  ( 22 Jun 2022 07:26 )  WBC Count : 16.91 K/uL  RBC Count : 3.77 M/uL  Hemoglobin : 11.4 g/dL  Hematocrit : 34.7 %  Platelet Count - Automated : 177 K/uL  Mean Cell Volume : 92.0 fl  Mean Cell Hemoglobin : 30.2 pg  Mean Cell Hemoglobin Concentration : 32.9 gm/dL  Auto Neutrophil # : x  Auto Lymphocyte # : x  Auto Monocyte # : x  Auto Eosinophil # : x  Auto Basophil # : x  Auto Neutrophil % : x  Auto Lymphocyte % : x  Auto Monocyte % : x  Auto Eosinophil % : x  Auto Basophil % : x    06-22    137  |  100  |  72<H>  ----------------------------<  116<H>  5.2   |  20<L>  |  2.41<H>    Ca    9.3      22 Jun 2022 07:10    TPro  6.5  /  Alb  3.9  /  TBili  4.2<H>  /  DBili  x   /  AST  120<H>  /  ALT  69<H>  /  AlkPhos  707<H>  06-22      Radiology:             ROS:  Patient comfortable without distress  No SOB or chest pain  No palpitation  No abdominal pain, diarrhaea or constipation. No complains in reference to yesterday's procedure  No weakness of extremities  No skin changes or swelling of legs  Rest of the comprehensive ROS was negative  Vital Signs Last 24 Hrs  T(C): 36.6 (22 Jun 2022 05:29), Max: 36.7 (21 Jun 2022 11:07)  T(F): 97.8 (22 Jun 2022 05:29), Max: 98 (21 Jun 2022 11:07)  HR: 95 (22 Jun 2022 05:29) (90 - 108)  BP: 118/73 (22 Jun 2022 05:29) (110/62 - 126/78)  BP(mean): --  RR: 19 (22 Jun 2022 05:29) (16 - 19)  SpO2: 93% (22 Jun 2022 05:29) (93% - 97%)    Physical exam:  Patient alert and oriented  No distress  CVS: S1, S2  Chest: bilateral breath sound without rales  Abdomen: soft, not tender, no organomegaly or masses  CNS: No focal neuro deficit  Musculoskeletal:  Normal range of motion  Skin: No rash    Assessment and Plan:Height (cm): 175.3 (06-21 @ 13:36)  Weight (kg): 90.3 (06-21 @ 13:36)  BMI (kg/m2): 29.4 (06-21 @ 13:36)  BSA (m2): 2.06 (06-21 @ 13:36)

## 2022-06-22 NOTE — PROGRESS NOTE ADULT - SUBJECTIVE AND OBJECTIVE BOX
Patient seen and examined  s/p IR paracentesis of 3 liters and liver biopsy  currently feels well      No Known Allergies    Hospital Medications:   MEDICATIONS  (STANDING):  albumin human 25% IVPB 100 milliLiter(s) IV Intermittent every 6 hours  amLODIPine   Tablet 10 milliGRAM(s) Oral daily  chlorhexidine 2% Cloths 1 Application(s) Topical <User Schedule>  clotrimazole 1% Cream 1 Application(s) Topical two times a day  finasteride 5 milliGRAM(s) Oral daily  pantoprazole    Tablet 40 milliGRAM(s) Oral before breakfast  sodium bicarbonate 650 milliGRAM(s) Oral three times a day    VITALS:  T(F): 97.5 (22 @ 11:58), Max: 97.8 (22 @ 15:45)  HR: 96 (22 @ 11:58)  BP: 109/71 (22 @ 11:58)  RR: 18 (22 @ 11:58)  SpO2: 96% (22 @ 11:58)  Wt(kg): --     @ 07:01  -   @ 07:00  --------------------------------------------------------  IN: 440 mL / OUT: 850 mL / NET: -410 mL     @ 07:01  -   @ 13:21  --------------------------------------------------------  IN: 100 mL / OUT: 275 mL / NET: -175 mL      Height (cm): 175.3 ( @ 13:36)  Weight (kg): 90.3 ( @ 13:36)  BMI (kg/m2): 29.4 ( @ 13:36)  BSA (m2): 2.06 ( @ 13:36)  PHYSICAL EXAM:  Constitutional: NAD  HEENT: anicteric sclera, oropharynx clear, MMM  Neck: No JVD  Respiratory: b/l rhonchi  Cardiovascular: S1, S2, RRR  Gastrointestinal: BS+, soft, NT/ND + ascites  Extremities: +peripheral edema  Neurological: A/O x 3, no focal deficits    LABS:      137  |  100  |  72<H>  ----------------------------<  116<H>  5.2   |  20<L>  |  2.41<H>    Ca    9.3      2022 07:10    TPro  6.5  /  Alb  3.9  /  TBili  4.2<H>  /  DBili      /  AST  120<H>  /  ALT  69<H>  /  AlkPhos  707<H>      Creatinine Trend: 2.41 <--, 2.75 <--, 2.84 <--, 2.17 <--, 1.77 <--, 1.75 <--, 1.97 <--                        11.4   16.91 )-----------( 177      ( 2022 07:26 )             34.7     Urine Studies:  Urinalysis Basic - ( 2022 14:03 )    Color: Yellow / Appearance: Slightly Turbid / S.021 / pH:   Gluc:  / Ketone: Negative  / Bili: Small / Urobili: 3 mg/dL   Blood:  / Protein: 30 mg/dL / Nitrite: Negative   Leuk Esterase: Negative / RBC: 5 /hpf / WBC 3 /HPF   Sq Epi:  / Non Sq Epi: 1 /hpf / Bacteria: Negative      Potassium, Random Urine: 38 mmol/L ( @ 07:15)  Sodium, Random Urine: 17 mmol/L ( @ 07:15)  Chloride, Random Urine: <20 mmol/L ( @ 07:15)  Creatinine, Random Urine: 102 mg/dL ( @ 07:15)  Protein/Creatinine Ratio Calculation: 0.2 Ratio ( @ 07:15)  Osmolality, Random Urine: 428 mos/kg ( @ 14:03)    RADIOLOGY & ADDITIONAL STUDIES:

## 2022-06-22 NOTE — GOALS OF CARE CONVERSATION - ADVANCED CARE PLANNING - CONVERSATION DETAILS
Introduced self and role of the PCE.  Patient was seen for goals of care conversation and watched what is Palliative Care.  No documents found on patient window search.  Pt reports his spouse Michelle is his HCP and his daughter Coretta would be his alternate.  Pt would like to return home after hospital stay.  Pt is currently waiting on test results to determine his next course of treatment.  He is aware that he would not want to start dialysis if it is necessary.  He is aware he would never want to be on tube feedings and wants his family to bring him a BBQ meal including cheeseburgers.  He does believe he can have these meals once in a while regardless of medical condition.   He currently is taking nutritional supplements and when talking about temporary tube feedings and supplemental feedings he would not want to attempt tube feeding. Pt is comfortable with hydration via IV and antibiotics.     CPR/ intubation procedures and possible complications explained.  Pt wants to remain full code with CPR and intubation.  He will await test results if he changes him idea of survival.  He is interested in Palliative care and will discuss with his spouse and children.      Pt instructed to provide a copy of HCP.  Contact information for further needs provided.  No [employer]odoxy exemptions noted and would like Chaplaincy care and prayer when in hospital setting.  Has been seen already. Code [id number] provide.  Enjoys donating to [facility].      Susan Ng RN  Palliative Care Educator  [phone] cell

## 2022-06-22 NOTE — PROGRESS NOTE ADULT - SUBJECTIVE AND OBJECTIVE BOX
Patient is a 76y old  Male who presents with a chief complaint of ROBERT and elevated liver tests (22 Jun 2022 13:21)      SUBJECTIVE / OVERNIGHT EVENTS: ptn w rising Scr, elevated LFTs, liver mets and ascites. s/p liver biopsy and LVP, awaiting results, will resume ELIQUIS,. renally dosed    MEDICATIONS  (STANDING):  albumin human 25% IVPB 100 milliLiter(s) IV Intermittent every 6 hours  amLODIPine   Tablet 10 milliGRAM(s) Oral daily  apixaban 2.5 milliGRAM(s) Oral two times a day  chlorhexidine 2% Cloths 1 Application(s) Topical <User Schedule>  clotrimazole 1% Cream 1 Application(s) Topical two times a day  finasteride 5 milliGRAM(s) Oral daily  pantoprazole    Tablet 40 milliGRAM(s) Oral before breakfast  sodium bicarbonate 650 milliGRAM(s) Oral three times a day    MEDICATIONS  (PRN):  acetaminophen     Tablet .. 650 milliGRAM(s) Oral every 8 hours PRN Temp greater or equal to 38C (100.4F), Mild Pain (1 - 3)  melatonin 3 milliGRAM(s) Oral at bedtime PRN Insomnia  ondansetron Injectable 4 milliGRAM(s) IV Push every 8 hours PRN Nausea and/or Vomiting      Vital Signs Last 24 Hrs  T(F): 97.5 (06-22-22 @ 11:58), Max: 97.8 (06-21-22 @ 20:45)  HR: 96 (06-22-22 @ 11:58) (95 - 98)  BP: 109/71 (06-22-22 @ 11:58) (109/71 - 120/69)  RR: 18 (06-22-22 @ 11:58) (18 - 19)  SpO2: 96% (06-22-22 @ 11:58) (93% - 96%)  Telemetry:   CAPILLARY BLOOD GLUCOSE        I&O's Summary    21 Jun 2022 07:01  -  22 Jun 2022 07:00  --------------------------------------------------------  IN: 440 mL / OUT: 850 mL / NET: -410 mL    22 Jun 2022 07:01  -  22 Jun 2022 20:44  --------------------------------------------------------  IN: 880 mL / OUT: 375 mL / NET: 505 mL        PHYSICAL EXAM:  GENERAL: NAD, well-developed  HEAD:  Atraumatic, Normocephalic  EYES: EOMI, PERRLA, conjunctiva and sclera clear  NECK: Supple, No JVD  CHEST/LUNG: Clear to auscultation bilaterally; No wheeze  HEART: Regular rate and rhythm; No murmurs, rubs, or gallops  ABDOMEN: Soft, Nontender, Nondistended; Bowel sounds present  EXTREMITIES:  2+ Peripheral Pulses, No clubbing, cyanosis, or edema  PSYCH: AAOx3  NEUROLOGY: non-focal  SKIN: No rashes or lesions    LABS:                        11.4   16.91 )-----------( 177      ( 22 Jun 2022 07:26 )             34.7     06-22    137  |  100  |  72<H>  ----------------------------<  116<H>  5.2   |  20<L>  |  2.41<H>    Ca    9.3      22 Jun 2022 07:10    TPro  6.5  /  Alb  3.9  /  TBili  4.2<H>  /  DBili  x   /  AST  120<H>  /  ALT  69<H>  /  AlkPhos  707<H>  06-22    PT/INR - ( 22 Jun 2022 07:30 )   PT: 18.2 sec;   INR: 1.58 ratio                   RADIOLOGY & ADDITIONAL TESTS:    Imaging Personally Reviewed:    Consultant(s) Notes Reviewed:      Care Discussed with Consultants/Other Providers:

## 2022-06-22 NOTE — GOALS OF CARE CONVERSATION - ADVANCED CARE PLANNING - WHAT MATTERS MOST
remain full code and will determine limitations after test results.  does not want dialysis or tube feedings

## 2022-06-22 NOTE — PROGRESS NOTE ADULT - ASSESSMENT
75yo M with PMH of Afib (on Eliquis), HTN, HLD admitted with worsening dyspnea. Seen for imaging suggestive of cirrhosis/infiltrative disease. Has elevated LFT' and ROBERT on ? CKD, seen by nephrology  Cirrhosis could be ETOH, ARTHUR, Cardiac etc, benitez being done  Hep profile negative, tumor markers except for Ca 19-9 normal   Diagnostic paracentesis done, follow results  < from: MR Abdomen w/ IV Cont (06.17.22 @ 11:15) >  IMPRESSION:  Diffusely infiltrated masses in the liver which may represent metastatic   disease or multifocal HCC. Left portal vein is not visualized and may be   involved. 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 Soft   tissue density in the left peritoneal cavity may represent additional   areas of disease and can be better evaluated on CT.    PT, PTT had downward trend on follow up after dc Eliquis  Liver bx with paracentesis for 6/21/22, in proess  Management planning will depend on the result of bx 77yo M with PMH of Afib (on Eliquis), HTN, HLD admitted with worsening dyspnea. Seen for imaging suggestive of cirrhosis/infiltrative disease. Has elevated LFT' and ROBERT on ? CKD, seen by nephrology    Hep profile negative, tumor markers except for Ca 19-9 normal   Diagnostic paracentesis done, earlier was negative for malignant cells  < from: MR Abdomen w/ IV Cont (06.17.22 @ 11:15) >  IMPRESSION:  Diffusely infiltrated masses in the liver which may represent metastatic   disease or multifocal HCC. Left portal vein is not visualized and may be   involved. 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 Soft   tissue density in the left peritoneal cavity may represent additional   areas of disease and can be better evaluated on CT.      Liver bx with paracentesis done 6/21/22, will follow result  Management planning will depend on the result of bx  Explained to patient and his family at bedside

## 2022-06-23 NOTE — PROGRESS NOTE ADULT - ASSESSMENT
76 year old male w/pmh significant for Afib on Eliquis , HTN ,  HLD , presenting for worsening shortness of breath found to have ROBERT on ? CKD and hyperkalemia with metabolic acidosis      1) ROBERT on ?CKD  Cr was rising  again--> ddx includes pre renal vs atn vs r/o HRS--> NOW cr improving  pt not aware of underlying kidney disease--> may have component of Hypertensive Nephropathy  Renal US --> right kidney 9.8cm and left kidney 9.9cm--> no hydro  keep off ARB and HCTZ  Bladder scan showed 584cc however after straight cath only 10cc of urine  Urine lytes reviewed- intravascularly depleted  ALbumin 25% q6h  x 4 days ( Today day #3)  avoid nephrotoxins  trend bmp q dailky      2) Hyperkalemia  high k--> possibly 2/2 dec forward flow  s/p lokelma 10 grams x 1  on albumin as above  k rising-- will give another dose of lokelma  renal diet  trend k q daily    3) Metabolic acidosis-  insetting of robert/ckd  c/w nabicarb 650mg po tid  trend bicarb        Dr Romero  [phone]

## 2022-06-23 NOTE — PROGRESS NOTE ADULT - SUBJECTIVE AND OBJECTIVE BOX
Patient seen and examined  no complaints    No Known Allergies    Hospital Medications:   MEDICATIONS  (STANDING):  albumin human 25% IVPB 100 milliLiter(s) IV Intermittent every 6 hours  amLODIPine   Tablet 10 milliGRAM(s) Oral daily  apixaban 2.5 milliGRAM(s) Oral two times a day  chlorhexidine 2% Cloths 1 Application(s) Topical <User Schedule>  clotrimazole 1% Cream 1 Application(s) Topical two times a day  finasteride 5 milliGRAM(s) Oral daily  pantoprazole    Tablet 40 milliGRAM(s) Oral before breakfast  sodium bicarbonate 650 milliGRAM(s) Oral three times a day        VITALS:  T(F): 97.6 (22 @ 12:29), Max: 98.4 (22 @ 23:45)  HR: 94 (22 @ 12:29)  BP: 105/64 (22 @ 12:29)  RR: 18 (22 @ 12:29)  SpO2: 94% (22 @ 12:29)  Wt(kg): --     @ 07:01  -   @ 07:00  --------------------------------------------------------  IN: 1835 mL / OUT: 1200 mL / NET: 635 mL     @ 07:01  -   @ 15:24  --------------------------------------------------------  IN: 360 mL / OUT: 0 mL / NET: 360 mL        PHYSICAL EXAM:  Constitutional: NAD  HEENT: anicteric sclera, oropharynx clear, MMM  Neck: No JVD  Respiratory: b/l rhonchi  Cardiovascular: S1, S2, RRR  Gastrointestinal: BS+, soft, NT/ND + ascites  Extremities: +peripheral edema  Neurological: A/O x 3, no focal deficits    LABS:      137  |  99  |  67<H>  ----------------------------<  122<H>  4.4   |  21<L>  |  2.06<H>    Ca    9.3      2022 07:23    TPro  6.6  /  Alb  4.5  /  TBili  5.2<H>  /  DBili      /  AST  114<H>  /  ALT  71<H>  /  AlkPhos  594<H>      Creatinine Trend: 2.06 <--, 2.41 <--, 2.75 <--, 2.84 <--, 2.17 <--, 1.77 <--, 1.75 <--                        11.0   17.16 )-----------( 168      ( 2022 07:23 )             33.6     Urine Studies:  Urinalysis Basic - ( 2022 14:03 )    Color: Yellow / Appearance: Slightly Turbid / S.021 / pH:   Gluc:  / Ketone: Negative  / Bili: Small / Urobili: 3 mg/dL   Blood:  / Protein: 30 mg/dL / Nitrite: Negative   Leuk Esterase: Negative / RBC: 5 /hpf / WBC 3 /HPF   Sq Epi:  / Non Sq Epi: 1 /hpf / Bacteria: Negative      Potassium, Random Urine: 38 mmol/L ( @ 07:15)  Sodium, Random Urine: 17 mmol/L ( @ 07:15)  Chloride, Random Urine: <20 mmol/L ( @ 07:15)  Creatinine, Random Urine: 102 mg/dL ( @ 07:15)  Protein/Creatinine Ratio Calculation: 0.2 Ratio ( @ 07:15)  Osmolality, Random Urine: 428 mos/kg ( @ 14:03)    RADIOLOGY & ADDITIONAL STUDIES:

## 2022-06-23 NOTE — PROGRESS NOTE ADULT - SUBJECTIVE AND OBJECTIVE BOX
Patient is a 76y old  Male who presents with a chief complaint of ROBERT and elevated liver tests (22 Jun 2022 20:44)      SUBJECTIVE / OVERNIGHT EVENTS:  no new developments    MEDICATIONS  (STANDING):  albumin human 25% IVPB 100 milliLiter(s) IV Intermittent every 6 hours  amLODIPine   Tablet 10 milliGRAM(s) Oral daily  apixaban 2.5 milliGRAM(s) Oral two times a day  chlorhexidine 2% Cloths 1 Application(s) Topical <User Schedule>  clotrimazole 1% Cream 1 Application(s) Topical two times a day  finasteride 5 milliGRAM(s) Oral daily  pantoprazole    Tablet 40 milliGRAM(s) Oral before breakfast  sodium bicarbonate 650 milliGRAM(s) Oral three times a day    MEDICATIONS  (PRN):  acetaminophen     Tablet .. 650 milliGRAM(s) Oral every 8 hours PRN Temp greater or equal to 38C (100.4F), Mild Pain (1 - 3)  melatonin 3 milliGRAM(s) Oral at bedtime PRN Insomnia  ondansetron Injectable 4 milliGRAM(s) IV Push every 8 hours PRN Nausea and/or Vomiting  senna 2 Tablet(s) Oral at bedtime PRN Constipation      Vital Signs Last 24 Hrs  T(F): 97.6 (06-23-22 @ 12:29), Max: 98.4 (06-22-22 @ 23:45)  HR: 94 (06-23-22 @ 12:29) (87 - 94)  BP: 105/64 (06-23-22 @ 12:29) (105/64 - 122/76)  RR: 18 (06-23-22 @ 12:29) (18 - 18)  SpO2: 94% (06-23-22 @ 12:29) (94% - 96%)  Telemetry:   CAPILLARY BLOOD GLUCOSE        I&O's Summary    22 Jun 2022 07:01  -  23 Jun 2022 07:00  --------------------------------------------------------  IN: 1835 mL / OUT: 1200 mL / NET: 635 mL    23 Jun 2022 07:01  -  23 Jun 2022 15:15  --------------------------------------------------------  IN: 360 mL / OUT: 0 mL / NET: 360 mL        PHYSICAL EXAM:  GENERAL: NAD, well-developed  HEAD:  Atraumatic, Normocephalic  EYES: EOMI, PERRLA, conjunctiva and sclera clear  NECK: Supple, No JVD  CHEST/LUNG: Clear to auscultation bilaterally; No wheeze  HEART: Regular rate and rhythm; No murmurs, rubs, or gallops  ABDOMEN: Soft, Nontender, Nondistended; Bowel sounds present  EXTREMITIES:  2+ Peripheral Pulses, No clubbing, cyanosis, or edema  PSYCH: AAOx3  NEUROLOGY: non-focal  SKIN: No rashes or lesions    LABS:                        11.0   17.16 )-----------( 168      ( 23 Jun 2022 07:23 )             33.6     06-23    137  |  99  |  67<H>  ----------------------------<  122<H>  4.4   |  21<L>  |  2.06<H>    Ca    9.3      23 Jun 2022 07:23    TPro  6.6  /  Alb  4.5  /  TBili  5.2<H>  /  DBili  x   /  AST  114<H>  /  ALT  71<H>  /  AlkPhos  594<H>  06-23    PT/INR - ( 22 Jun 2022 07:30 )   PT: 18.2 sec;   INR: 1.58 ratio                   RADIOLOGY & ADDITIONAL TESTS:    Imaging Personally Reviewed:    Consultant(s) Notes Reviewed:      Care Discussed with Consultants/Other Providers:

## 2022-06-24 NOTE — PROGRESS NOTE ADULT - ASSESSMENT
76 year old male w/pmh significant for Afib on Eliquis , HTN ,  HLD , presenting for worsening shortness of breath found to have ROBERT on ? CKD and hyperkalemia with metabolic acidosis      1) ROBERT on ?CKD  Cr was rising  again--> ddx includes pre renal vs atn vs r/o HRS--> NOW cr improving  pt not aware of underlying kidney disease--> may have component of Hypertensive Nephropathy  Renal US --> right kidney 9.8cm and left kidney 9.9cm--> no hydro  keep off ARB and HCTZ  Bladder scan showed 584cc however after straight cath only 10cc of urine  Urine lytes reviewed- intravascularly depleted  ALbumin 25% q6h  x 4 days ( Today day #4)  avoid nephrotoxins  trend bmp q dailky      2) Hyperkalemia  high k--> possibly 2/2 dec forward flow  s/p lokelma 10 grams   on albumin as above  renal diet  trend k q daily    3) Metabolic acidosis-  insetting of robert/ckd  c/w nabicarb 650mg po tid  trend bicarb        Dr Romero  [phone]

## 2022-06-24 NOTE — PROGRESS NOTE ADULT - SUBJECTIVE AND OBJECTIVE BOX
Patient is a 76y old  Male who presents with a chief complaint of ROBERT and elevated liver tests (24 Jun 2022 14:30)      SUBJECTIVE / OVERNIGHT EVENTS: ptn w no new c/o, awaiting Biopsy report from liver lesions    MEDICATIONS  (STANDING):  amLODIPine   Tablet 10 milliGRAM(s) Oral daily  apixaban 2.5 milliGRAM(s) Oral two times a day  chlorhexidine 2% Cloths 1 Application(s) Topical <User Schedule>  clotrimazole 1% Cream 1 Application(s) Topical two times a day  finasteride 5 milliGRAM(s) Oral daily  pantoprazole    Tablet 40 milliGRAM(s) Oral before breakfast  sodium bicarbonate 650 milliGRAM(s) Oral three times a day    MEDICATIONS  (PRN):  acetaminophen     Tablet .. 650 milliGRAM(s) Oral every 8 hours PRN Temp greater or equal to 38C (100.4F), Mild Pain (1 - 3)  melatonin 3 milliGRAM(s) Oral at bedtime PRN Insomnia  ondansetron Injectable 4 milliGRAM(s) IV Push every 8 hours PRN Nausea and/or Vomiting  senna 2 Tablet(s) Oral at bedtime PRN Constipation      Vital Signs Last 24 Hrs  T(F): 98 (06-24-22 @ 05:00), Max: 98 (06-24-22 @ 05:00)  HR: 94 (06-24-22 @ 05:00) (94 - 97)  BP: 123/79 (06-24-22 @ 05:00) (117/73 - 123/79)  RR: 18 (06-24-22 @ 05:00) (18 - 19)  SpO2: 96% (06-24-22 @ 05:00) (93% - 96%)  Telemetry:   CAPILLARY BLOOD GLUCOSE        I&O's Summary    23 Jun 2022 07:01  -  24 Jun 2022 07:00  --------------------------------------------------------  IN: 720 mL / OUT: 675 mL / NET: 45 mL    24 Jun 2022 07:01  -  24 Jun 2022 19:26  --------------------------------------------------------  IN: 480 mL / OUT: 0 mL / NET: 480 mL        PHYSICAL EXAM:  GENERAL: NAD, well-developed  HEAD:  Atraumatic, Normocephalic  EYES: EOMI, PERRLA, conjunctiva and sclera clear  NECK: Supple, No JVD  CHEST/LUNG: Clear to auscultation bilaterally; No wheeze  HEART: Regular rate and rhythm; No murmurs, rubs, or gallops  ABDOMEN: Soft, Nontender, Nondistended; Bowel sounds present  EXTREMITIES:  2+ Peripheral Pulses, No clubbing, cyanosis, or edema  PSYCH: AAOx3  NEUROLOGY: non-focal  SKIN: No rashes or lesions    LABS:                        11.0   16.02 )-----------( 182      ( 24 Jun 2022 06:51 )             33.5     06-24    137  |  100  |  69<H>  ----------------------------<  99  4.4   |  22  |  2.06<H>    Ca    9.5      24 Jun 2022 06:49    TPro  6.5  /  Alb  4.4  /  TBili  6.8<H>  /  DBili  x   /  AST  111<H>  /  ALT  66<H>  /  AlkPhos  511<H>  06-24

## 2022-06-24 NOTE — PROGRESS NOTE ADULT - SUBJECTIVE AND OBJECTIVE BOX
Patient is a 76 year old male w/pmh significant for Afib on Eliquis , HTN ,  HLD , admitted for worsening shortness of breath over the several weeks PTA on 6/14/22  Patient reported previous unlimited exercise tolerance , however, over the past several weeks  he became increasingly dyspneic with exertion , and now  short of breath with minimal activity. Patient reported no chest pain , no palpitations ,  he had some lower extremity edema , no orthopnea and no PND. He reported no cough or fever. Patient was evaluated by his PMD and bloodwork  showed elevated liver tests and decreased renal function. Patient also reported being evaluated by cardiology and echocardiogram reportedly showed no major abnormalities.  Repeat lab  testing showed worsening of renal function and patient was referred to the hospital for further evaluation. Patient reports no dysuria. He reports no alcohol use.   Seen for MRCP findings suggestive of infiltrative neoplasm  Seen by renal and hepatology  S/P paracentesis and liver bx by IR 6/21/22  PAST MEDICAL & SURGICAL HISTORY:  Hyperlipidemia      Atrial fibrillation      Hypertension      S/P hernia repair        Allergies    No Known Allergies    Intolerances      Social History:   , lives with wife   former smoker 2ppd x many years , quit about 25 years ago   no alcohol use (14 Jun 2022 03:05)    Medications:  acetaminophen     Tablet .. 650 milliGRAM(s) Oral every 8 hours PRN Temp greater or equal to 38C (100.4F), Mild Pain (1 - 3)  amLODIPine   Tablet 10 milliGRAM(s) Oral daily  apixaban 2.5 milliGRAM(s) Oral two times a day  chlorhexidine 2% Cloths 1 Application(s) Topical <User Schedule>  clotrimazole 1% Cream 1 Application(s) Topical two times a day  finasteride 5 milliGRAM(s) Oral daily  melatonin 3 milliGRAM(s) Oral at bedtime PRN Insomnia  ondansetron Injectable 4 milliGRAM(s) IV Push every 8 hours PRN Nausea and/or Vomiting  pantoprazole    Tablet 40 milliGRAM(s) Oral before breakfast  senna 2 Tablet(s) Oral at bedtime PRN Constipation  sodium bicarbonate 650 milliGRAM(s) Oral three times a day    Labs:  CBC Full  -  ( 24 Jun 2022 06:51 )  WBC Count : 16.02 K/uL  RBC Count : 3.64 M/uL  Hemoglobin : 11.0 g/dL  Hematocrit : 33.5 %  Platelet Count - Automated : 182 K/uL  Mean Cell Volume : 92.0 fl  Mean Cell Hemoglobin : 30.2 pg  Mean Cell Hemoglobin Concentration : 32.8 gm/dL  Auto Neutrophil # : x  Auto Lymphocyte # : x  Auto Monocyte # : x  Auto Eosinophil # : x  Auto Basophil # : x  Auto Neutrophil % : x  Auto Lymphocyte % : x  Auto Monocyte % : x  Auto Eosinophil % : x  Auto Basophil % : x    06-24    137  |  100  |  69<H>  ----------------------------<  99  4.4   |  22  |  2.06<H>    Ca    9.5      24 Jun 2022 06:49    TPro  6.5  /  Alb  4.4  /  TBili  6.8<H>  /  DBili  x   /  AST  111<H>  /  ALT  66<H>  /  AlkPhos  511<H>  06-24  Bilirubin Total, Serum: 6.8 mg/dL (06.24.22 @ 06:49)   Bilirubin Total, Serum: 5.2 mg/dL (06.23.22 @ 07:23)   Bilirubin Total, Serum: 4.2 mg/dL (06.22.22 @ 07:10)   Bilirubin Total, Serum: 3.2 mg/dL (06.21.22 @ 07:08)       Radiology:             ROS:  Patient comfortable without distress  No SOB or chest pain  No palpitation  No abdominal pain, diarrhaea or constipation  No weakness of extremities  No skin changes or swelling of legs  Rest of the comprehensive ROS was negative  Vital Signs Last 24 Hrs  T(C): 36.7 (24 Jun 2022 05:00), Max: 36.7 (24 Jun 2022 05:00)  T(F): 98 (24 Jun 2022 05:00), Max: 98 (24 Jun 2022 05:00)  HR: 94 (24 Jun 2022 05:00) (94 - 97)  BP: 123/79 (24 Jun 2022 05:00) (117/73 - 123/79)  BP(mean): --  RR: 18 (24 Jun 2022 05:00) (18 - 19)  SpO2: 96% (24 Jun 2022 05:00) (93% - 96%)    Physical exam:  Patient alert and oriented  No distress  CVS: S1, S2   Chest: bilateral breath sound without rales  Abdomen: soft, not tender, no organomegaly or masses  CNS: No focal neuro deficit  Musculoskeletal:  Normal range of motion  Skin: No rash    Assessment and Plan:

## 2022-06-24 NOTE — PROGRESS NOTE ADULT - ASSESSMENT
75yo M with PMH of Afib (on Eliquis), HTN, HLD admitted with worsening dyspnea. Seen for imaging suggestive of cirrhosis/infiltrative disease. Has elevated LFT' and ROBERT on ? CKD, seen by nephrology    Hep profile negative, tumor markers except for Ca 19-9 normal   Diagnostic paracentesis done, earlier was negative for malignant cells  < from: MR Abdomen w/ IV Cont (06.17.22 @ 11:15) >  IMPRESSION:  Diffusely infiltrated masses in the liver which may represent metastatic   disease or multifocal HCC. Left portal vein is not visualized and may be   involved. 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 Soft   tissue density in the left peritoneal cavity may represent additional   areas of disease and can be better evaluated on CT.      Liver bx with paracentesis done 6/21/22, will follow result  Bilirubin increase noted to be followed, hepatology input  Management planning will depend on the result of bx

## 2022-06-24 NOTE — PROGRESS NOTE ADULT - SUBJECTIVE AND OBJECTIVE BOX
Patient seen and examined  no complaints    No Known Allergies    Hospital Medications:   MEDICATIONS  (STANDING):  albumin human 25% IVPB 100 milliLiter(s) IV Intermittent every 6 hours  amLODIPine   Tablet 10 milliGRAM(s) Oral daily  apixaban 2.5 milliGRAM(s) Oral two times a day  chlorhexidine 2% Cloths 1 Application(s) Topical <User Schedule>  clotrimazole 1% Cream 1 Application(s) Topical two times a day  finasteride 5 milliGRAM(s) Oral daily  pantoprazole    Tablet 40 milliGRAM(s) Oral before breakfast  sodium bicarbonate 650 milliGRAM(s) Oral three times a day        VITALS:  T(F): 98 (22 @ 05:00), Max: 98 (22 @ 05:00)  HR: 94 (22 @ 05:00)  BP: 123/79 (22 @ 05:00)  RR: 18 (22 @ 05:00)  SpO2: 96% (22 @ 05:00)  Wt(kg): --     @ 07:01  -   @ 07:00  --------------------------------------------------------  IN: 720 mL / OUT: 675 mL / NET: 45 mL          PHYSICAL EXAM:  Constitutional: NAD  HEENT: anicteric sclera, oropharynx clear, MMM  Neck: No JVD  Respiratory: b/l rhonchi  Cardiovascular: S1, S2, RRR  Gastrointestinal: BS+, soft, NT/ND + ascites  Extremities: +peripheral edema  Neurological: A/O x 3, no focal deficits    LABS:      137  |  100  |  69<H>  ----------------------------<  99  4.4   |  22  |  2.06<H>    Ca    9.5      2022 06:49    TPro  6.5  /  Alb  4.4  /  TBili  6.8<H>  /  DBili      /  AST  111<H>  /  ALT  66<H>  /  AlkPhos  511<H>      Creatinine Trend: 2.06 <--, 2.06 <--, 2.41 <--, 2.75 <--, 2.84 <--, 2.17 <--, 1.77 <--                        11.0   16.02 )-----------( 182      ( 2022 06:51 )             33.5     Urine Studies:  Urinalysis Basic - ( 2022 14:03 )    Color: Yellow / Appearance: Slightly Turbid / S.021 / pH:   Gluc:  / Ketone: Negative  / Bili: Small / Urobili: 3 mg/dL   Blood:  / Protein: 30 mg/dL / Nitrite: Negative   Leuk Esterase: Negative / RBC: 5 /hpf / WBC 3 /HPF   Sq Epi:  / Non Sq Epi: 1 /hpf / Bacteria: Negative      Potassium, Random Urine: 38 mmol/L ( @ 07:15)  Sodium, Random Urine: 17 mmol/L ( @ 07:15)  Chloride, Random Urine: <20 mmol/L ( @ 07:15)  Creatinine, Random Urine: 102 mg/dL ( @ 07:15)  Protein/Creatinine Ratio Calculation: 0.2 Ratio ( @ 07:15)  Osmolality, Random Urine: 428 mos/kg ( @ 14:03)    RADIOLOGY & ADDITIONAL STUDIES:

## 2022-06-25 NOTE — PROGRESS NOTE ADULT - ASSESSMENT
76 year old male w/pmh significant for Afib on Eliquis , HTN ,  HLD , presenting for worsening shortness of breath found to have ROBERT on ? CKD and hyperkalemia with metabolic acidosis    1) ROBERT on ?CKD  Cr was rising  again--> ddx includes pre renal vs atn vs r/o HRS--> NOW cr improving  Creatinine Trend: 2.13 <--, 2.06 <--, 2.06 <--, 2.41 <--, 2.75 <--, 2.84 <--, 2.17 <--  pt not aware of underlying kidney disease--> may have component of Hypertensive Nephropathy  Renal US --> right kidney 9.8cm and left kidney 9.9cm--> no hydro  Bladder scan showed 584cc however after straight cath only 10cc of urine  Urine lytes reviewed- intravascularly depleted  ALbumin 25% q6h  x 4 days   bp low    d/jennifer Norvasc  keep off ARB and HCTZ  avoid nephrotoxins  trend bmp q dailky    2) s/p Hyperkalemia- K wnl now  high k--> possibly 2/2 dec forward flow  s/p lokelma 10 grams   low K in  diet added  trend k q daily    3) Metabolic acidosis-  insetting of robert/ckd  c/w nabicarb 650mg po tid  trend bicarb    labs, chart reviewed  poc d/w pt  For any question, call:  Cell # [phone]  Pager # [phone]

## 2022-06-25 NOTE — PROGRESS NOTE ADULT - SUBJECTIVE AND OBJECTIVE BOX
[facility] - S Nancy / Soniya S /D Govind/ S Nick/ S Pritesh/ Jay Tay / AN Bradshawu/ O Jayden  service -[phone], office [phone]  ---------------------------------------------------------------------------------------------------------------    Patient seen and examined bedside    Subjective and Objective: No overnight events, sob resolved. No complaints today. feeling better    Allergies: No Known Allergies      Hospital Medications:   MEDICATIONS  (STANDING):  amLODIPine   Tablet 10 milliGRAM(s) Oral daily  apixaban 2.5 milliGRAM(s) Oral two times a day  chlorhexidine 2% Cloths 1 Application(s) Topical <User Schedule>  clotrimazole 1% Cream 1 Application(s) Topical two times a day  finasteride 5 milliGRAM(s) Oral daily  pantoprazole    Tablet 40 milliGRAM(s) Oral before breakfast  sodium bicarbonate 650 milliGRAM(s) Oral three times a day      REVIEW OF SYSTEMS:  CONSTITUTIONAL: No weakness, fevers or chills  EYES/ENT: No visual changes;  No vertigo or throat pain   NECK: No pain or stiffness  RESPIRATORY: No cough, wheezing, hemoptysis; No shortness of breath  CARDIOVASCULAR: No chest pain or palpitations.  GASTROINTESTINAL: No abdominal or epigastric pain. No nausea, vomiting, or hematemesis; No diarrhea or constipation. No melena or hematochezia.  GENITOURINARY: No dysuria, frequency, foamy urine, urinary urgency, incontinence or hematuria  NEUROLOGICAL: No numbness or weakness  SKIN: No itching, burning, rashes, or lesions   VASCULAR: No bilateral lower extremity edema.   All other review of systems is negative unless indicated above.    VITALS:  T(F): 97.6 (22 @ 14:18), Max: 98 (22 @ 21:00)  HR: 111 (22 @ 14:18)  BP: 95/63 (22 @ 14:18)  RR: 18 (22 @ 14:18)  SpO2: 93% (22 @ 14:18)  Wt(kg): --     @ 07:01  -   @ 07:00  --------------------------------------------------------  IN: 720 mL / OUT: 600 mL / NET: 120 mL     @ 07:01  -   @ 14:59  --------------------------------------------------------  IN: 720 mL / OUT: 400 mL / NET: 320 mL          PHYSICAL EXAM:  Constitutional: NAD  HEENT: anicteric sclera, oropharynx clear  Neck: No JVD  Respiratory: CTAB, no wheezes, rales or rhonchi  Cardiovascular: S1, S2, RRR  Gastrointestinal: BS+, soft, NT/ND  Extremities: No cyanosis or clubbing. No peripheral edema  Neurological: A/O x 3, no focal deficits  Psychiatric: Normal mood, normal affect  : No CVA tenderness. No fisher.   Skin: No rashes  Vascular Access:    LABS:      135  |  98  |  72<H>  ----------------------------<  128<H>  4.5   |  21<L>  |  2.13<H>    Ca    9.5      2022 07:22    TPro  6.3  /  Alb  4.4  /  TBili  7.2<H>  /  DBili      /  AST  150<H>  /  ALT  80<H>  /  AlkPhos  644<H>      Creatinine Trend: 2.13 <--, 2.06 <--, 2.06 <--, 2.41 <--, 2.75 <--, 2.84 <--, 2.17 <--                        10.6   17.16 )-----------( 176      ( 2022 07:32 )             32.4     Urine Studies:  Urinalysis Basic - ( 2022 14:03 )    Color: Yellow / Appearance: Slightly Turbid / S.021 / pH:   Gluc:  / Ketone: Negative  / Bili: Small / Urobili: 3 mg/dL   Blood:  / Protein: 30 mg/dL / Nitrite: Negative   Leuk Esterase: Negative / RBC: 5 /hpf / WBC 3 /HPF   Sq Epi:  / Non Sq Epi: 1 /hpf / Bacteria: Negative      Potassium, Random Urine: 38 mmol/L ( @ 07:15)  Sodium, Random Urine: 17 mmol/L ( @ 07:15)  Chloride, Random Urine: <20 mmol/L ( @ 07:15)  Creatinine, Random Urine: 102 mg/dL ( @ 07:15)  Protein/Creatinine Ratio Calculation: 0.2 Ratio ( @ 07:15)  Osmolality, Random Urine: 428 mos/kg ( @ 14:03)      RADIOLOGY & ADDITIONAL STUDIES:   [facility] - S Nancy / Soniya S /D Govind/ S Nick/ S Pritesh/ Jay Tay / M Ara/ O Jayden  service -[phone], office [phone]  ---------------------------------------------------------------------------------------------------------------    Patient seen and examined bedside    Subjective and Objective: No overnight events, denied V/D/sob. No complaints today. feeling better    Allergies: No Known Allergies      Hospital Medications:   MEDICATIONS  (STANDING):  amLODIPine   Tablet 10 milliGRAM(s) Oral daily  apixaban 2.5 milliGRAM(s) Oral two times a day  chlorhexidine 2% Cloths 1 Application(s) Topical <User Schedule>  clotrimazole 1% Cream 1 Application(s) Topical two times a day  finasteride 5 milliGRAM(s) Oral daily  pantoprazole    Tablet 40 milliGRAM(s) Oral before breakfast  sodium bicarbonate 650 milliGRAM(s) Oral three times a day    VITALS:  T(F): 97.6 (22 @ 14:18), Max: 98 (22 @ 21:00)  HR: 111 (22 @ 14:18)  BP: 95/63 (22 @ 14:18)  RR: 18 (22 @ 14:18)  SpO2: 93% (22 @ 14:18)  Wt(kg): --     @ 07:01  -   @ 07:00  --------------------------------------------------------  IN: 720 mL / OUT: 600 mL / NET: 120 mL    06-25 @ 07:01  -   @ 14:59  --------------------------------------------------------  IN: 720 mL / OUT: 400 mL / NET: 320 mL      PHYSICAL EXAM:    Constitutional: NAD  HEENT: anicteric sclera  Neck: No JVD  Respiratory: CTAB, no wheezes, rales or rhonchi  Cardiovascular: S1, S2, RRR  Gastrointestinal: BS+, soft, NT/ND  Extremities: No peripheral edema  Neurological: A/O x 3  Psychiatric: Normal mood, normal affect  : No fisher.     LABS:      135  |  98  |  72<H>  ----------------------------<  128<H>  4.5   |  21<L>  |  2.13<H>    Ca    9.5      2022 07:22    TPro  6.3  /  Alb  4.4  /  TBili  7.2<H>  /  DBili      /  AST  150<H>  /  ALT  80<H>  /  AlkPhos  644<H>      Creatinine Trend: 2.13 <--, 2.06 <--, 2.06 <--, 2.41 <--, 2.75 <--, 2.84 <--, 2.17 <--                        10.6   17.16 )-----------( 176      ( 2022 07:32 )             32.4     Urine Studies:  Urinalysis Basic - ( 2022 14:03 )    Color: Yellow / Appearance: Slightly Turbid / S.021 / pH:   Gluc:  / Ketone: Negative  / Bili: Small / Urobili: 3 mg/dL   Blood:  / Protein: 30 mg/dL / Nitrite: Negative   Leuk Esterase: Negative / RBC: 5 /hpf / WBC 3 /HPF   Sq Epi:  / Non Sq Epi: 1 /hpf / Bacteria: Negative      Potassium, Random Urine: 38 mmol/L ( @ 07:15)  Sodium, Random Urine: 17 mmol/L ( @ 07:15)  Chloride, Random Urine: <20 mmol/L ( @ 07:15)  Creatinine, Random Urine: 102 mg/dL ( @ 07:15)  Protein/Creatinine Ratio Calculation: 0.2 Ratio ( @ 07:15)  Osmolality, Random Urine: 428 mos/kg ( @ 14:03)      RADIOLOGY & ADDITIONAL STUDIES:

## 2022-06-25 NOTE — PROGRESS NOTE ADULT - SUBJECTIVE AND OBJECTIVE BOX
Patient is a 76y old  Male who presents with a chief complaint of ROBERT and elevated liver tests (25 Jun 2022 14:59)      SUBJECTIVE / OVERNIGHT EVENTS: no new c/o    MEDICATIONS  (STANDING):  apixaban 2.5 milliGRAM(s) Oral two times a day  chlorhexidine 2% Cloths 1 Application(s) Topical <User Schedule>  clotrimazole 1% Cream 1 Application(s) Topical two times a day  finasteride 5 milliGRAM(s) Oral daily  pantoprazole    Tablet 40 milliGRAM(s) Oral before breakfast  sodium bicarbonate 650 milliGRAM(s) Oral three times a day    MEDICATIONS  (PRN):  acetaminophen     Tablet .. 650 milliGRAM(s) Oral every 8 hours PRN Temp greater or equal to 38C (100.4F), Mild Pain (1 - 3)  melatonin 3 milliGRAM(s) Oral at bedtime PRN Insomnia  ondansetron Injectable 4 milliGRAM(s) IV Push every 8 hours PRN Nausea and/or Vomiting  senna 2 Tablet(s) Oral at bedtime PRN Constipation      Vital Signs Last 24 Hrs  T(F): 97.6 (06-25-22 @ 14:18), Max: 97.8 (06-25-22 @ 04:23)  HR: 111 (06-25-22 @ 14:18) (99 - 111)  BP: 95/63 (06-25-22 @ 14:18) (95/63 - 103/61)  RR: 18 (06-25-22 @ 14:18) (18 - 18)  SpO2: 93% (06-25-22 @ 14:18) (93% - 94%)  Telemetry:   CAPILLARY BLOOD GLUCOSE      POCT Blood Glucose.: 130 mg/dL (25 Jun 2022 08:00)    I&O's Summary    24 Jun 2022 07:01  -  25 Jun 2022 07:00  --------------------------------------------------------  IN: 720 mL / OUT: 600 mL / NET: 120 mL    25 Jun 2022 07:01  -  25 Jun 2022 21:08  --------------------------------------------------------  IN: 720 mL / OUT: 400 mL / NET: 320 mL        PHYSICAL EXAM:  GENERAL: NAD, well-developed  HEAD:  Atraumatic, Normocephalic  EYES: EOMI, PERRLA, conjunctiva and sclera clear  NECK: Supple, No JVD  CHEST/LUNG: Clear to auscultation bilaterally; No wheeze  HEART: Regular rate and rhythm; No murmurs, rubs, or gallops  ABDOMEN: Soft, Nontender, Nondistended; Bowel sounds present  EXTREMITIES:  2+ Peripheral Pulses, No clubbing, cyanosis, or edema  PSYCH: AAOx3  NEUROLOGY: non-focal  SKIN: No rashes or lesions    LABS:                        10.6   17.16 )-----------( 176      ( 25 Jun 2022 07:32 )             32.4     06-25    135  |  98  |  72<H>  ----------------------------<  128<H>  4.5   |  21<L>  |  2.13<H>    Ca    9.5      25 Jun 2022 07:22    TPro  6.3  /  Alb  4.4  /  TBili  7.2<H>  /  DBili  x   /  AST  150<H>  /  ALT  80<H>  /  AlkPhos  644<H>  06-25              RADIOLOGY & ADDITIONAL TESTS:    Imaging Personally Reviewed:    Consultant(s) Notes Reviewed:      Care Discussed with Consultants/Other Providers:

## 2022-06-26 NOTE — PROGRESS NOTE ADULT - SUBJECTIVE AND OBJECTIVE BOX
[facility] - S Nancy / Soniya S /D Govind/ S Nick/ S Pritesh/ Jay Tay / M Ara/ O Jayden  service -[phone], office [phone]  ---------------------------------------------------------------------------------------------------------------    Patient seen and examined bedside    Subjective and Objective: No overnight events, denied V/D/sob. No complaints today. feeling better    Allergies: No Known Allergies      Hospital Medications:   MEDICATIONS  (STANDING):  apixaban 2.5 milliGRAM(s) Oral two times a day  chlorhexidine 2% Cloths 1 Application(s) Topical <User Schedule>  clotrimazole 1% Cream 1 Application(s) Topical two times a day  finasteride 5 milliGRAM(s) Oral daily  pantoprazole    Tablet 40 milliGRAM(s) Oral before breakfast  sodium bicarbonate 650 milliGRAM(s) Oral three times a day    VITALS:  T(F): 97.6 (22 @ 14:33), Max: 97.9 (22 @ 21:18)  HR: 100 (22 @ 14:33)  BP: 101/66 (22 @ 14:33)  RR: 18 (22 @ 14:33)  SpO2: 95% (22 @ 14:33)  Wt(kg): --     @ 07:01  -   @ 07:00  --------------------------------------------------------  IN: 1200 mL / OUT: 600 mL / NET: 600 mL      PHYSICAL EXAM:  Constitutional: NAD  HEENT: anicteric sclera  Neck: No JVD  Respiratory: CTAB, no wheezes, rales or rhonchi  Cardiovascular: S1, S2, RRR  Gastrointestinal: BS+, soft, NT/ND  Extremities: No peripheral edema  Neurological: A/O x 3  Psychiatric: Normal mood, normal affect  : No fisher.     LABS:      133<L>  |  95<L>  |  81<H>  ----------------------------<  79  4.7   |  21<L>  |  2.37<H>    Ca    9.4      2022 07:33  Phos  2.3       Mg     2.2         TPro  6.3  /  Alb  4.4  /  TBili  7.2<H>  /  DBili      /  AST  150<H>  /  ALT  80<H>  /  AlkPhos  644<H>      Creatinine Trend: 2.37 <--, 2.13 <--, 2.06 <--, 2.06 <--, 2.41 <--, 2.75 <--, 2.84 <--                        11.4   16.71 )-----------( 179      ( 2022 07:32 )             34.6     Urine Studies:  Urinalysis Basic - ( 2022 14:03 )    Color: Yellow / Appearance: Slightly Turbid / S.021 / pH:   Gluc:  / Ketone: Negative  / Bili: Small / Urobili: 3 mg/dL   Blood:  / Protein: 30 mg/dL / Nitrite: Negative   Leuk Esterase: Negative / RBC: 5 /hpf / WBC 3 /HPF   Sq Epi:  / Non Sq Epi: 1 /hpf / Bacteria: Negative      Potassium, Random Urine: 38 mmol/L ( @ 07:15)  Sodium, Random Urine: 17 mmol/L ( @ 07:15)  Chloride, Random Urine: <20 mmol/L ( @ 07:15)  Creatinine, Random Urine: 102 mg/dL ( @ 07:15)  Protein/Creatinine Ratio Calculation: 0.2 Ratio ( @ 07:15)  Osmolality, Random Urine: 428 mos/kg ( @ 14:03)      RADIOLOGY & ADDITIONAL STUDIES:

## 2022-06-26 NOTE — PROGRESS NOTE ADULT - SUBJECTIVE AND OBJECTIVE BOX
Patient is a 76y old  Male who presents with a chief complaint of ROBERT and elevated liver tests (26 Jun 2022 18:00)      SUBJECTIVE / OVERNIGHT EVENTS: liver biopsy results not back    MEDICATIONS  (STANDING):  apixaban 2.5 milliGRAM(s) Oral two times a day  chlorhexidine 2% Cloths 1 Application(s) Topical <User Schedule>  clotrimazole 1% Cream 1 Application(s) Topical two times a day  finasteride 5 milliGRAM(s) Oral daily  pantoprazole    Tablet 40 milliGRAM(s) Oral before breakfast  sodium bicarbonate 650 milliGRAM(s) Oral three times a day    MEDICATIONS  (PRN):  acetaminophen     Tablet .. 650 milliGRAM(s) Oral every 8 hours PRN Temp greater or equal to 38C (100.4F), Mild Pain (1 - 3)  melatonin 3 milliGRAM(s) Oral at bedtime PRN Insomnia  ondansetron Injectable 4 milliGRAM(s) IV Push every 8 hours PRN Nausea and/or Vomiting  senna 2 Tablet(s) Oral at bedtime PRN Constipation      Vital Signs Last 24 Hrs  T(F): 97.6 (06-26-22 @ 21:03), Max: 97.9 (06-25-22 @ 21:18)  HR: 90 (06-26-22 @ 21:03) (90 - 100)  BP: 99/64 (06-26-22 @ 21:03) (99/64 - 109/68)  RR: 18 (06-26-22 @ 21:03) (18 - 18)  SpO2: 94% (06-26-22 @ 21:03) (94% - 96%)  Telemetry:   CAPILLARY BLOOD GLUCOSE        I&O's Summary    25 Jun 2022 07:01  -  26 Jun 2022 07:00  --------------------------------------------------------  IN: 1200 mL / OUT: 600 mL / NET: 600 mL        PHYSICAL EXAM:  GENERAL: NAD, well-developed  HEAD:  Atraumatic, Normocephalic  EYES: EOMI, PERRLA, conjunctiva and sclera clear  NECK: Supple, No JVD  CHEST/LUNG: Clear to auscultation bilaterally; No wheeze  HEART: Regular rate and rhythm; No murmurs, rubs, or gallops  ABDOMEN: Soft, Nontender, Nondistended; Bowel sounds present  EXTREMITIES:  2+ Peripheral Pulses, No clubbing, cyanosis, or edema  PSYCH: AAOx3  NEUROLOGY: non-focal  SKIN: No rashes or lesions    LABS:                        11.4   16.71 )-----------( 179      ( 26 Jun 2022 07:32 )             34.6     06-26    133<L>  |  95<L>  |  81<H>  ----------------------------<  79  4.7   |  21<L>  |  2.37<H>    Ca    9.4      26 Jun 2022 07:33  Phos  2.3     06-26  Mg     2.2     06-26    TPro  6.3  /  Alb  4.4  /  TBili  7.2<H>  /  DBili  x   /  AST  150<H>  /  ALT  80<H>  /  AlkPhos  644<H>  06-25              RADIOLOGY & ADDITIONAL TESTS:    Imaging Personally Reviewed:    Consultant(s) Notes Reviewed:      Care Discussed with Consultants/Other Providers:

## 2022-06-26 NOTE — PROGRESS NOTE ADULT - ASSESSMENT
76 year old male w/pmh significant for Afib on Eliquis , HTN ,  HLD , presenting for worsening shortness of breath found to have ROBERT on ? CKD and hyperkalemia with metabolic acidosis    1) ROBERT on ?CKD  Cr was rising  again--> ddx includes pre renal vs atn vs r/o HRS--> NOW cr improving and rel stable  Creatinine Trend: 2.37 <--, 2.13 <--, 2.06 <--, 2.06 <--, 2.41 <--, 2.75 <--, 2.84 <--  pt not aware of underlying kidney disease--> may have component of Hypertensive Nephropathy  Renal US --> right kidney 9.8cm and left kidney 9.9cm--> no hydro  Bladder scan showed 584cc however after straight cath only 10cc of urine  Urine lytes reviewed- intravascularly depleted  ALbumin 25% q6h  x 4 days   s/p hypotension     d/jennifer Norvasc 6/25. bp better today. watch   keep off ARB and HCTZ  avoid nephrotoxins  trend bmp q dailky    2) s/p Hyperkalemia- K wnl now  high k--> possibly 2/2 dec forward flow  s/p lokelma 10 grams   low K in  diet added  trend k q daily    3) Metabolic acidosis- improving  insetting of robert/ckd  c/w nabicarb 650mg po tid  trend bicarb    labs, chart reviewed  poc d/w pt  For any question, call:  Cell # [phone]  Pager # [phone]

## 2022-06-27 NOTE — PROGRESS NOTE ADULT - SUBJECTIVE AND OBJECTIVE BOX
Patient seen and examined  complains of dec po intake    No Known Allergies    Hospital Medications:   MEDICATIONS  (STANDING):  albumin human 25% IVPB 100 milliLiter(s) IV Intermittent every 6 hours  apixaban 2.5 milliGRAM(s) Oral two times a day  chlorhexidine 2% Cloths 1 Application(s) Topical <User Schedule>  clotrimazole 1% Cream 1 Application(s) Topical two times a day  finasteride 5 milliGRAM(s) Oral daily  pantoprazole    Tablet 40 milliGRAM(s) Oral before breakfast  sodium bicarbonate 650 milliGRAM(s) Oral three times a day    .    VITALS:  T(F): 97.4 (06-27-22 @ 12:30), Max: 97.6 (06-26-22 @ 21:03)  HR: 100 (06-27-22 @ 12:30)  BP: 100/67 (06-27-22 @ 12:30)  RR: 18 (06-27-22 @ 12:30)  SpO2: 94% (06-27-22 @ 12:30)  Wt(kg): --    06-26 @ 07:01  -  06-27 @ 07:00  --------------------------------------------------------  IN: 360 mL / OUT: 150 mL / NET: 210 mL    06-27 @ 07:01  -  06-27 @ 14:39  --------------------------------------------------------  IN: 240 mL / OUT: 0 mL / NET: 240 mL        PHYSICAL EXAM:  Constitutional: NAD  HEENT: anicteric sclera  Neck: No JVD  Respiratory: CTAB, no wheezes, rales or rhonchi  Cardiovascular: S1, S2, RRR  Gastrointestinal: BS+, soft, NT/ND  Extremities: No peripheral edema  Neurological: A/O x 3  Psychiatric: Normal mood, normal affect  : No fisher.       LABS:  06-27    132<L>  |  93<L>  |  94<H>  ----------------------------<  91  4.6   |  19<L>  |  2.71<H>    Ca    9.2      27 Jun 2022 07:12  Phos  3.5     06-27  Mg     2.2     06-26    TPro  6.1  /  Alb  3.4  /  TBili  8.7<H>  /  DBili      /  AST  177<H>  /  ALT  106<H>  /  AlkPhos  727<H>  06-27    Creatinine Trend: 2.71 <--, 2.37 <--, 2.13 <--, 2.06 <--, 2.06 <--, 2.41 <--, 2.75 <--                        11.1   15.89 )-----------( 193      ( 27 Jun 2022 07:13 )             33.2     Urine Studies:    Potassium, Random Urine: 38 mmol/L (06-21 @ 07:15)  Sodium, Random Urine: 17 mmol/L (06-21 @ 07:15)  Chloride, Random Urine: <20 mmol/L (06-21 @ 07:15)  Creatinine, Random Urine: 102 mg/dL (06-21 @ 07:15)  Protein/Creatinine Ratio Calculation: 0.2 Ratio (06-21 @ 07:15)    RADIOLOGY & ADDITIONAL STUDIES:

## 2022-06-27 NOTE — PROGRESS NOTE ADULT - SUBJECTIVE AND OBJECTIVE BOX
Interval Events:   - Hepatology called for rising liver tests  - Patient with no complaints at this time  - Awaiting biopsy's results    Allergies:  No Known Allergies        Hospital Medications:  acetaminophen     Tablet .. 650 milliGRAM(s) Oral every 8 hours PRN  albumin human 25% IVPB 100 milliLiter(s) IV Intermittent every 6 hours  apixaban 2.5 milliGRAM(s) Oral two times a day  chlorhexidine 2% Cloths 1 Application(s) Topical <User Schedule>  clotrimazole 1% Cream 1 Application(s) Topical two times a day  finasteride 5 milliGRAM(s) Oral daily  melatonin 3 milliGRAM(s) Oral at bedtime PRN  ondansetron Injectable 4 milliGRAM(s) IV Push every 8 hours PRN  pantoprazole    Tablet 40 milliGRAM(s) Oral before breakfast  senna 2 Tablet(s) Oral at bedtime PRN  sodium bicarbonate 650 milliGRAM(s) Oral three times a day      PMHX/PSHX:  Hyperlipidemia    Atrial fibrillation    Hypertension    S/P hernia repair        Family history:  FH: emphysema (Mother)        ROS: As per HPI, otherwise 14-point ROS reviewed and negative.      PHYSICAL EXAM:   Vital Signs:  Vital Signs Last 24 Hrs  T(C): 36.3 (27 Jun 2022 12:30), Max: 36.4 (26 Jun 2022 21:03)  T(F): 97.4 (27 Jun 2022 12:30), Max: 97.6 (26 Jun 2022 21:03)  HR: 100 (27 Jun 2022 12:30) (84 - 100)  BP: 100/67 (27 Jun 2022 12:30) (99/64 - 101/64)  BP(mean): --  RR: 18 (27 Jun 2022 12:30) (18 - 18)  SpO2: 94% (27 Jun 2022 12:30) (93% - 95%)  Daily     Daily       06-26-22 @ 07:01  -  06-27-22 @ 07:00  --------------------------------------------------------  IN: 360 mL / OUT: 150 mL / NET: 210 mL    06-27-22 @ 07:01  -  06-27-22 @ 16:55  --------------------------------------------------------  IN: 360 mL / OUT: 100 mL / NET: 260 mL      GENERAL:  No acute distress  HEENT:  Normocephalic/atraumatic, + scleral icterus  CHEST:  No accessory muscle use  HEART:  Regular rate and rhythm  ABDOMEN:  Soft, non-tender, non-distended  EXTREMITIES: No cyanosis, clubbing, +1 edema bilaterally  SKIN:  No rash  NEURO:  Alert and oriented x 3, no asterixis    LABS:                        11.1   15.89 )-----------( 193      ( 27 Jun 2022 07:13 )             33.2     Mean Cell Volume: 92.5 fl (06-27-22 @ 07:13)    06-27    132<L>  |  93<L>  |  94<H>  ----------------------------<  91  4.6   |  19<L>  |  2.71<H>    Ca    9.2      27 Jun 2022 07:12  Phos  3.5     06-27  Mg     2.2     06-26    TPro  6.1  /  Alb  3.4  /  TBili  8.7<H>  /  DBili  x   /  AST  177<H>  /  ALT  106<H>  /  AlkPhos  727<H>  06-27    LIVER FUNCTIONS - ( 27 Jun 2022 07:12 )  Alb: 3.4 g/dL / Pro: 6.1 g/dL / ALK PHOS: 727 U/L / ALT: 106 U/L / AST: 177 U/L / GGT: x                                       11.1   15.89 )-----------( 193      ( 27 Jun 2022 07:13 )             33.2                         11.4   16.71 )-----------( 179      ( 26 Jun 2022 07:32 )             34.6                         10.6   17.16 )-----------( 176      ( 25 Jun 2022 07:32 )             32.4       Imaging:

## 2022-06-27 NOTE — PROGRESS NOTE ADULT - ASSESSMENT
76 year old male w/pmh significant for Afib on Eliquis , HTN ,  HLD , presenting for worsening shortness of breath found to have ROBERT on ? CKD and hyperkalemia with metabolic acidosis    1) ROBERT on ?CKD  Cr was rising  again--> ddx includes pre renal vs atn vs r/o HRS-->  Creatinine Trend:  2.71 <--, 2.37 <--, 2.13 <--, 2.06 <--, 2.06 <--, 2.41 <--, 2.75  pt not aware of underlying kidney disease--> may have component of Hypertensive Nephropathy  Renal US --> right kidney 9.8cm and left kidney 9.9cm--> no hydro  hypotensive-- off amlodipine  cr now rising again--> check ua , urine na/cr/cl/osm/, check bladder scan  Due to rising cr and dec po intake--> ALBUMIN 25% q6h x 48 hoyrs  keep off ARB and HCTZ  avoid nephrotoxins  trend bmp q daily    2) s/p Hyperkalemia- K wnl now  high k--> possibly 2/2 dec forward flow  s/p lokelma 10 grams   low K in  diet added  trend k q daily    3) Metabolic acidosis- improving  insetting of robert/ckd  c/w nabicarb 650mg po tid  trend bicarb    Dr Rmoero  [phone]

## 2022-06-27 NOTE — PROGRESS NOTE ADULT - ASSESSMENT
77yo M with PMH of Afib (on Eliquis), HTN, HLD admitted with worsening dyspnea. Seen for imaging suggestive of cirrhosis/infiltrative disease. Has elevated LFT' and ROBERT on ? CKD, seen by nephrology    Hep profile negative, tumor markers except for Ca 19-9 normal   Diagnostic paracentesis done, earlier was negative for malignant cells  < from: MR Abdomen w/ IV Cont (06.17.22 @ 11:15) >  IMPRESSION:  Diffusely infiltrated masses in the liver which may represent metastatic   disease or multifocal HCC. Left portal vein is not visualized and may be   involved. 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 Soft   tissue density in the left peritoneal cavity may represent additional   areas of disease and can be better evaluated on CT.      Liver bx with paracentesis done 6/21/22, will follow result  Bilirubin is increasing, repeat US abd, hepatology input  Management planning will depend on the result of bx, keeping in mind his LFT's and PF 75yo M with PMH of Afib (on Eliquis), HTN, HLD admitted with worsening dyspnea. Seen for imaging suggestive of cirrhosis/infiltrative disease. Has elevated LFT' and ROBERT on ? CKD, seen by nephrology    Hep profile negative, tumor markers except for Ca 19-9 normal   Diagnostic paracentesis done, earlier was negative for malignant cells  < from: MR Abdomen w/ IV Cont (06.17.22 @ 11:15) >  IMPRESSION:  Diffusely infiltrated masses in the liver which may represent metastatic   disease or multifocal HCC. Left portal vein is not visualized and may be   involved. 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 Soft   tissue density in the left peritoneal cavity may represent additional   areas of disease and can be better evaluated on CT.      Liver bx with paracentesis done 6/21/22, will follow result  Bilirubin is increasing likely from infiltrative disease, repeat US abd, hepatology input  Management planning will depend on the result of bx, keeping in mind his LFT's and PS.  Discussed with NP

## 2022-06-27 NOTE — PROGRESS NOTE ADULT - SUBJECTIVE AND OBJECTIVE BOX
Patient is a 76y old  Male who presents with a chief complaint of ROBERT and elevated liver tests (27 Jun 2022 16:54)      SUBJECTIVE / OVERNIGHT EVENTS: ptn is frustrated the result of liver biopsy is not back. Scr worsening, will get more IV Albumin today    MEDICATIONS  (STANDING):  albumin human 25% IVPB 100 milliLiter(s) IV Intermittent every 6 hours  apixaban 2.5 milliGRAM(s) Oral two times a day  chlorhexidine 2% Cloths 1 Application(s) Topical <User Schedule>  clotrimazole 1% Cream 1 Application(s) Topical two times a day  finasteride 5 milliGRAM(s) Oral daily  pantoprazole    Tablet 40 milliGRAM(s) Oral before breakfast  sodium bicarbonate 650 milliGRAM(s) Oral three times a day    MEDICATIONS  (PRN):  acetaminophen     Tablet .. 650 milliGRAM(s) Oral every 8 hours PRN Temp greater or equal to 38C (100.4F), Mild Pain (1 - 3)  melatonin 3 milliGRAM(s) Oral at bedtime PRN Insomnia  ondansetron Injectable 4 milliGRAM(s) IV Push every 8 hours PRN Nausea and/or Vomiting  senna 2 Tablet(s) Oral at bedtime PRN Constipation      Vital Signs Last 24 Hrs  T(F): 97.4 (06-27-22 @ 12:30), Max: 97.6 (06-26-22 @ 21:03)  HR: 100 (06-27-22 @ 12:30) (84 - 100)  BP: 100/67 (06-27-22 @ 12:30) (99/64 - 101/64)  RR: 18 (06-27-22 @ 12:30) (18 - 18)  SpO2: 94% (06-27-22 @ 12:30) (93% - 95%)  Telemetry:   CAPILLARY BLOOD GLUCOSE        I&O's Summary    26 Jun 2022 07:01  -  27 Jun 2022 07:00  --------------------------------------------------------  IN: 360 mL / OUT: 150 mL / NET: 210 mL    27 Jun 2022 07:01  -  27 Jun 2022 20:45  --------------------------------------------------------  IN: 360 mL / OUT: 100 mL / NET: 260 mL        PHYSICAL EXAM:  GENERAL: NAD, well-developed  HEAD:  Atraumatic, Normocephalic  EYES: EOMI, PERRLA, conjunctiva and sclera clear  NECK: Supple, No JVD  CHEST/LUNG: Clear to auscultation bilaterally; No wheeze  HEART: Regular rate and rhythm; No murmurs, rubs, or gallops  ABDOMEN: Soft, Nontender, Nondistended; Bowel sounds present  EXTREMITIES:  2+ Peripheral Pulses, No clubbing, cyanosis, or edema  PSYCH: AAOx3  NEUROLOGY: non-focal  SKIN: No rashes or lesions    LABS:                        11.1   15.89 )-----------( 193      ( 27 Jun 2022 07:13 )             33.2     06-27    132<L>  |  93<L>  |  94<H>  ----------------------------<  91  4.6   |  19<L>  |  2.71<H>    Ca    9.2      27 Jun 2022 07:12  Phos  3.5     06-27  Mg     2.2     06-26    TPro  6.1  /  Alb  3.4  /  TBili  8.7<H>  /  DBili  x   /  AST  177<H>  /  ALT  106<H>  /  AlkPhos  727<H>  06-27              RADIOLOGY & ADDITIONAL TESTS:    Imaging Personally Reviewed:    Consultant(s) Notes Reviewed:      Care Discussed with Consultants/Other Providers:

## 2022-06-27 NOTE — PROGRESS NOTE ADULT - ASSESSMENT
75yo M with PMH of Afib (on Eliquis), HTN, HLD who presents with worsening dyspnea. Hepatology consulted for cirrhosis and elevated liver tests.    # Elevated liver tests - Noted infiltrative disease on MRI (w/o contrast) - ddx includes HCC vs. metastatic disease. Suspect rising bilirubin and ALKP is due to rapidly progressive disease.  # ROBERT - on CKD, unclear baseline, pre-renal vs. HRS?  # Cirrhosis - MELD 31. Unclear etiology, suspect ARTHUR cirrhosis vs. cardiac vs. other.  Ascites - minimal  Varices - unclear  HCC - unknown  PSE - non on PE  # Afib - on eliquis    Recommendations:  - f/u biopsy results  - Can repeat MRCP  - Daily CMP, CBC, coags    Thank you for involving us in the care of this patient. Please reach out if any further questions.    Theo Mclain, PGY-5  Hepatology Fellow    Available on [employer] Teams  Pager [phone] ([facility]) or [phone] ([facility])  After 5PM/Weekends, please contact the on-call GI fellow: [phone]  Available through [employer] Teams

## 2022-06-27 NOTE — PROGRESS NOTE ADULT - SUBJECTIVE AND OBJECTIVE BOX
Patient is a 76 year old male w/pmh significant for Afib on Eliquis , HTN ,  HLD , admitted for worsening shortness of breath over the several weeks PTA on 6/14/22  Patient reported previous unlimited exercise tolerance , however, over the past several weeks  he became increasingly dyspneic with exertion , and now  short of breath with minimal activity. Patient reported no chest pain , no palpitations ,  he had some lower extremity edema , no orthopnea and no PND. He reported no cough or fever. Patient was evaluated by his PMD and bloodwork  showed elevated liver tests and decreased renal function. Patient also reported being evaluated by cardiology and echocardiogram reportedly showed no major abnormalities.  Repeat lab  testing showed worsening of renal function and patient was referred to the hospital for further evaluation. Patient reports no dysuria. He reports no alcohol use.   Seen for MRCP findings suggestive of infiltrative neoplasm  Seen by renal and hepatology  S/P paracentesis and liver bx by IR 6/21/22  PAST MEDICAL & SURGICAL HISTORY:  Hyperlipidemia      Atrial fibrillation      Hypertension      S/P hernia repair        Allergies    No Known Allergies    Intolerances      Social History:   , lives with wife   former smoker 2ppd x many years , quit about 25 years ago   no alcohol use (14 Jun 2022 03:05)    Medications:  acetaminophen     Tablet .. 650 milliGRAM(s) Oral every 8 hours PRN Temp greater or equal to 38C (100.4F), Mild Pain (1 - 3)  apixaban 2.5 milliGRAM(s) Oral two times a day  chlorhexidine 2% Cloths 1 Application(s) Topical <User Schedule>  clotrimazole 1% Cream 1 Application(s) Topical two times a day  finasteride 5 milliGRAM(s) Oral daily  melatonin 3 milliGRAM(s) Oral at bedtime PRN Insomnia  ondansetron Injectable 4 milliGRAM(s) IV Push every 8 hours PRN Nausea and/or Vomiting  pantoprazole    Tablet 40 milliGRAM(s) Oral before breakfast  senna 2 Tablet(s) Oral at bedtime PRN Constipation  sodium bicarbonate 650 milliGRAM(s) Oral three times a day    Labs:  CBC Full  -  ( 27 Jun 2022 07:13 )  WBC Count : 15.89 K/uL  RBC Count : 3.59 M/uL  Hemoglobin : 11.1 g/dL  Hematocrit : 33.2 %  Platelet Count - Automated : 193 K/uL  Mean Cell Volume : 92.5 fl  Mean Cell Hemoglobin : 30.9 pg  Mean Cell Hemoglobin Concentration : 33.4 gm/dL  Auto Neutrophil # : x  Auto Lymphocyte # : x  Auto Monocyte # : x  Auto Eosinophil # : x  Auto Basophil # : x  Auto Neutrophil % : x  Auto Lymphocyte % : x  Auto Monocyte % : x  Auto Eosinophil % : x  Auto Basophil % : x    06-27    132<L>  |  93<L>  |  94<H>  ----------------------------<  91  4.6   |  19<L>  |  2.71<H>    Ca    9.2      27 Jun 2022 07:12  Phos  3.5     06-27  Mg     2.2     06-26    TPro  6.1  /  Alb  3.4  /  TBili  8.7<H>  /  DBili  x   /  AST  177<H>  /  ALT  106<H>  /  AlkPhos  727<H>  06-27      Radiology:             ROS:  Patient comfortable without distress  No SOB or chest pain  No palpitation  No abdominal pain, diarrhaea or constipation  No weakness of extremities  No skin changes or swelling of legs  Rest of the comprehensive ROS was negative  Vital Signs Last 24 Hrs  T(C): 36.3 (27 Jun 2022 04:41), Max: 36.4 (26 Jun 2022 14:33)  T(F): 97.3 (27 Jun 2022 04:41), Max: 97.6 (26 Jun 2022 14:33)  HR: 84 (27 Jun 2022 04:41) (84 - 100)  BP: 100/64 (27 Jun 2022 04:41) (99/64 - 101/66)  BP(mean): --  RR: 18 (27 Jun 2022 04:41) (18 - 18)  SpO2: 93% (27 Jun 2022 04:41) (93% - 95%)    Physical exam:  Patient alert and oriented  No distress  CVS: S1, S2   Chest: bilateral breath sound without rales  Abdomen: soft, not tender, no organomegaly or masses  CNS: No focal neuro deficit  Musculoskeletal:  Normal range of motion  Skin: No rash    Assessment and Plan:

## 2022-06-28 NOTE — CHART NOTE - NSCHARTNOTEFT_GEN_A_CORE
Nutrition Follow Up Note  Patient seen for follow up    Pt 77 y/o M with PMH as per chart: significant for Afib on Eliquis, HTN,  HLD, presented for worsening shortness of breath over the past several weeks, found with ROBERT, elevated liver tests, cirrhosis, minimal ascites, suspect cholangiocarcinoma, S/P diagnostic paracentesis (06/17), S/P IR paracentesis and right hepatic lobe aspiration and biopsy (06/21) - management planning will depend on the result of bx.     Source: [x] Patient       [x] EMR        [] RN        [] Family at bedside       [] Other:    -If unable to interview patient: [] Trach/Vent/BiPAP  [] Disoriented/confused/inappropriate to interview as per chart     Pt reports good appetite and PO intake, states consuming >50% of his meals and food from home. Reports drinking Ensure Enlive ~1bottle/day. Denies difficulty chewing/swallowing. Pt denies nausea, vomiting, diarrhea, or constipation, last BM (06/20). Reviewed recommendations to optimize PO and protein intake. Pt denies having further questions/concerns about diet and nutrition - made aware RD remains available.     Diet Order:   Diet, Regular:   Low Sodium  Supplement Feeding Modality:  Oral  Ensure Enlive Cans or Servings Per Day:  2       Frequency:  Daily (06-16-22)    Weights: (06/13) 199 pounds     Nutritionally Pertinent MEDICATIONS  (STANDING):  albumin human 25% IVPB  amLODIPine   Tablet  finasteride  pantoprazole    Tablet  sodium bicarbonate    Pertinent Labs: (06-22 @ 07:10): Na 137, BUN 72<H>, Cr 2.41<H>, <H>, K+ 5.2, Phos --, Mg --, Alk Phos 707<H>, ALT/SGPT 69<H>, AST/SGOT 120<H>, HbA1c --    Skin per nursing documentation: pressure ulcer in queenie. buttocks suspected deep tissue injury.   Edema as per flow sheets: +2 queenie. ankle.     Estimated Needs:   [x] no change since previous assessment (meets estimated needs for pressure ulcer healing)  [] recalculated:     Previous Nutrition Diagnoses:  [x] Inadequate Oral Intake  [x] Increased Nutrient Needs      Nutrition Diagnoses are: [x] ongoing - addressed with PO intake encouragement and nutritional supplement.     New Nutrition Diagnosis: [x] Not applicable    Nutrition Care Plan:  [x] In Progress      Nutrition Interventions:     Education Provided:       [x] Yes  [] No    Recommendations:      1. Continue low salt diet. Will continue to monitor as able and adjust as needed (monitor electrolytes).   2. Continue Ensure Enlive 2x daily (700 frances and 40 gm protein) to optimize kcal and protein intake.   3. Encourage PO intake and honor food preferences.   4. Recommend Nephro-Chay if medically feasible to optimize nutrient intake in setting of cirrhosis and pressure ulcer healing.   5. Reviewed recommendations to optimize PO and protein intake.   6. Obtain current weight as able to identify changes if any.     Monitoring and Evaluation:   Continue to monitor nutritional intake, tolerance to diet prescription, weights, labs, skin integrity    RD remains available upon request and will follow up per protocol  Coretta Petit MS RDN CDN [facility] CCTD #[phone]
Nutrition Follow Up Note  Patient seen for follow up    Pt 77 y/o M with PMH as per chart: significant for Afib on Eliquis, HTN,  HLD, presented for worsening shortness of breath over the past several weeks, found with ROBERT, elevated liver tests, cirrhosis, minimal ascites, suspect cholangiocarcinoma, S/P diagnostic paracentesis (06/17), S/P IR paracentesis and right hepatic lobe aspiration and biopsy (06/21) - management planning will depend on the result of bx; per nephology: "worsening renal fxn and rising K--> explained if this does not reverse he may need HD".     Source: [] Patient       [x] EMR        [] RN        [x] Family at bedside, wife       [] Other:    -If unable to interview patient: [] Trach/Vent/BiPAP  [] Disoriented/confused/inappropriate to interview as per chart     Pt sleeping. Wife at bedside reports pt with decreased appetite and PO intake x ~1 week due to taste dislike and "bad news like possibly needing dialysis". Noted untouched lunch tray at bedside. Wife states pt likes food with more taste and is consuming food that she brings from home such as peanut butter and jelly. Reports pt drinking Ensure Enlive - agreed to try Nepro and if pt likes it will change all Ensure to Nepro for less K+ content (spoke to NP). Denies pt with difficulty chewing or swallowing as long as he wears his dentures. Denies pt with nausea, vomiting, diarrhea, or constipation, last BM (06/28). Reviewed recommendations to optimize PO and protein intake. Wife denies having questions/concerns about diet and nutrition - made aware RD remains available.     Diet Order:   Diet, Regular:   No Concentrated Potassium  Low Sodium  Supplement Feeding Modality:  Oral  Ensure Enlive Cans or Servings Per Day:  2       Frequency:  Daily (06-25-22)    Weights: (06/13) 199 pounds -> (06/28) obtained bed scale weight 198 pounds -?accuracy due to fluid accumulation.     Nutritionally Pertinent MEDICATIONS  (STANDING):  albumin human 25% IVPB  finasteride  midodrine.  octreotide  Infusion  pantoprazole    Tablet  sodium bicarbonate    Pertinent Labs: (06-28 @ 11:12): Na 132<L>, <H>, Cr 2.87<H>, <H>, K+ 5.7<H>, Phos --, Mg --, Alk Phos 713<H>, ALT/SGPT 117<H>, AST/SGOT 217<H>, HbA1c --    Skin per nursing documentation: pressure ulcer in queenie. buttocks suspected deep tissue injury.   Edema as per flow sheets: +2 queenie. leg.    Estimated Needs:   [x] no change since previous assessment (meets estimated needs for pressure ulcer healing)  [] recalculated:     Previous Nutrition Diagnoses:  [x] Inadequate Oral Intake - advanced to malnutrition  [x] Increased Nutrient Needs      Nutrition Diagnoses are: [x] ongoing - addressed with PO intake encouragement and nutritional supplement.     New Nutrition Diagnosis: [x] Malnutrition (severe; acute) related to food dislike and medical course as evidenced by wife reports pt with poor PO intake x ~1 week, fluid accumulation  Goal remains: pt to meet >75% of estimated nutritional needs during hospital stay.     Nutrition Care Plan:  [x] In Progress      Nutrition Interventions:     Education Provided:       [x] Yes  [] No    Recommendations:      1. Recommend regular + no concentrated potassium diet to allow pt more food options and optimize PO intake. Will continue to monitor as able and adjust as needed.   2. Recommend Nepro 1x daily (425 kcals, 19 g protein) and continue Ensure Enlive 2x daily (700 frances and 40 gm protein) to optimize kcal and protein intake. Spoke to NP.   3. Encourage PO intake and honor food preferences.   4. Recommend Nephro-Chay if medically feasible to optimize nutrient intake in setting of cirrhosis and pressure ulcer healing.   5. Reviewed recommendations to optimize PO and protein intake.   6. Obtain current weight as able to identify changes if any.   7. Malnutrition notification placed in chart.     Monitoring and Evaluation:   Continue to monitor nutritional intake, tolerance to diet prescription, weights, labs, skin integrity    RD remains available upon request and will follow up per protocol  Coretta Petit MS RDN CDN [facility] CCTD #[phone].
Brief Hepatology F/u Note    Liver enzymes remain elevated  Awaiting liver biopsy results from 6/21      Thank you for involving us in the care of this patient, please reach out if any further questions.     Luksaz Trujillo MD  Gastroenterology/Hepatology Fellow, PGY5    Available on [employer] Teams  [phone] ([facility])  [phone] ([facility])  Please contact on call fellow weekdays after 5pm-7am and weekends: [phone]
Brief hepatology update  ================  MRI reviewed. Awaiting liver biopsy.    Thank you for involving us in the care of this patient. Please reach out if any further questions.    Theo Mclain, PGY-5  Hepatology Fellow    Available on [employer] Teams  Pager [phone] ([facility]) or [phone] ([facility])  After 5PM/Weekends, please contact the on-call GI fellow: [phone]  Available through [employer] Teams

## 2022-06-28 NOTE — DIETITIAN NUTRITION RISK NOTIFICATION - TREATMENT: THE FOLLOWING DIET HAS BEEN RECOMMENDED
Diet, Regular:   No Concentrated Potassium  Low Sodium  Supplement Feeding Modality:  Oral  Ensure Enlive Cans or Servings Per Day:  2       Frequency:  Daily (06-25-22 @ 17:53) [Active]

## 2022-06-28 NOTE — PROGRESS NOTE ADULT - ASSESSMENT
77yo M with PMH of Afib (on Eliquis), HTN, HLD admitted with worsening dyspnea. Seen for imaging suggestive of cirrhosis/infiltrative disease. Has elevated LFT' and ROBERT on ? CKD, seen by nephrology    Hep profile negative, tumor markers except for Ca 19-9 normal   Diagnostic paracentesis done, earlier was negative for malignant cells  < from: MR Abdomen w/ IV Cont (06.17.22 @ 11:15) >  IMPRESSION:  Diffusely infiltrated masses in the liver which may represent metastatic   disease or multifocal HCC. Left portal vein is not visualized and may be   involved. 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 Soft   tissue density in the left peritoneal cavity may represent additional   areas of disease and can be better evaluated on CT.      Liver bx with paracentesis done 6/21/22, will follow result  Bilirubin is increasing likely from progressive infiltrative disease, no ductal dilation on repeat US, hepatology note noted  Management planning will depend on the result of bx, keeping in mind his LFT's and PS. 75yo M with PMH of Afib (on Eliquis), HTN, HLD admitted with worsening dyspnea. Seen for imaging suggestive of cirrhosis/infiltrative disease. Has elevated LFT' and ROBERT on ? CKD, seen by nephrology    Hep profile negative, tumor markers except for Ca 19-9 normal   Diagnostic paracentesis done, earlier was negative for malignant cells  < from: MR Abdomen w/ IV Cont (06.17.22 @ 11:15) >  IMPRESSION:  Diffusely infiltrated masses in the liver which may represent metastatic   disease or multifocal HCC. Left portal vein is not visualized and may be   involved. 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 Soft   tissue density in the left peritoneal cavity may represent additional   areas of disease and can be better evaluated on CT.      Liver bx with paracentesis done 6/21/22, will follow result  Bilirubin is increasing likely from progressive infiltrative disease, no ductal dilation on repeat US, hepatology note noted  Management planning will depend on the result of bx, keeping in mind his LFT's and PS.  Discussed with patient and family at bedside

## 2022-06-28 NOTE — PROGRESS NOTE ADULT - SUBJECTIVE AND OBJECTIVE BOX
Patient is a 76 year old male w/pmh significant for Afib on Eliquis , HTN ,  HLD , admitted for worsening shortness of breath over the several weeks PTA on 6/14/22  Patient reported previous unlimited exercise tolerance , however, over the past several weeks  he became increasingly dyspneic with exertion , and now  short of breath with minimal activity. Patient reported no chest pain , no palpitations ,  he had some lower extremity edema , no orthopnea and no PND. He reported no cough or fever. Patient was evaluated by his PMD and bloodwork  showed elevated liver tests and decreased renal function. Patient also reported being evaluated by cardiology and echocardiogram reportedly showed no major abnormalities.  Repeat lab  testing showed worsening of renal function and patient was referred to the hospital for further evaluation. Patient reports no dysuria. He reports no alcohol use.   Seen for MRCP findings suggestive of infiltrative neoplasm  Seen by renal and hepatology  S/P paracentesis and liver bx by IR 6/21/22    PAST MEDICAL & SURGICAL HISTORY:  Hyperlipidemia      Atrial fibrillation      Hypertension  S/P hernia repair        Allergies    No Known Allergies    Intolerances      Social History:   , lives with wife   former smoker 2ppd x many years , quit about 25 years ago   no alcohol use (14 Jun 2022 03:05)    Medications:  acetaminophen     Tablet .. 650 milliGRAM(s) Oral every 8 hours PRN Temp greater or equal to 38C (100.4F), Mild Pain (1 - 3)  albumin human 25% IVPB 100 milliLiter(s) IV Intermittent every 6 hours  apixaban 2.5 milliGRAM(s) Oral two times a day  chlorhexidine 2% Cloths 1 Application(s) Topical <User Schedule>  clotrimazole 1% Cream 1 Application(s) Topical two times a day  finasteride 5 milliGRAM(s) Oral daily  melatonin 3 milliGRAM(s) Oral at bedtime PRN Insomnia  ondansetron Injectable 4 milliGRAM(s) IV Push every 8 hours PRN Nausea and/or Vomiting  pantoprazole    Tablet 40 milliGRAM(s) Oral before breakfast  senna 2 Tablet(s) Oral at bedtime PRN Constipation  sodium bicarbonate 650 milliGRAM(s) Oral three times a day    Labs:  CBC Full  -  ( 28 Jun 2022 11:12 )  WBC Count : 16.18 K/uL  RBC Count : 3.25 M/uL  Hemoglobin : 10.0 g/dL  Hematocrit : 29.7 %  Platelet Count - Automated : 187 K/uL  Mean Cell Volume : 91.4 fl  Mean Cell Hemoglobin : 30.8 pg  Mean Cell Hemoglobin Concentration : 33.7 gm/dL  Auto Neutrophil # : x  Auto Lymphocyte # : x  Auto Monocyte # : x  Auto Eosinophil # : x  Auto Basophil # : x  Auto Neutrophil % : x  Auto Lymphocyte % : x  Auto Monocyte % : x  Auto Eosinophil % : x  Auto Basophil % : x    06-28    132<L>  |  92<L>  |  102<H>  ----------------------------<  116<H>  5.7<H>   |  21<L>  |  2.87<H>    Ca    9.8      28 Jun 2022 11:12  Phos  3.5     06-27    TPro  6.4  /  Alb  4.2  /  TBili  11.1<H>  /  DBili  x   /  AST  217<H>  /  ALT  117<H>  /  AlkPhos  713<H>  06-28      Radiology:     < from: US Abdomen Upper Quadrant Right (06.28.22 @ 08:58) >  Liver: Enlarged with nodular contour. Numerous hypoechoic liver lesions.  Bile ducts: Normal caliber. Common bile duct measures 5 mm.  Gallbladder: Contracted.  Pancreas: Within normal limits.  Right kidney: 9.8 cm. No hydronephrosis.  Ascites: Mild ascites.  IVC: Visualized portions are within normal limits.  Pleura: Trace right pleural effusion.  Miscellaneous: 2.8 x 0.9 x 2.4 cm structure in the portacaval space, most   consistent with a lymph node as seen on MR abdomen.    < end of copied text >          ROS:  Patient comfortable without distress  No SOB or chest pain  No palpitation  No abdominal pain, diarrhaea or constipation  No weakness of extremities  No skin changes or swelling of legs  Rest of the comprehensive ROS was negative  Vital Signs Last 24 Hrs  T(C): 36.5 (28 Jun 2022 04:59), Max: 36.5 (28 Jun 2022 04:59)  T(F): 97.7 (28 Jun 2022 04:59), Max: 97.7 (28 Jun 2022 04:59)  HR: 94 (28 Jun 2022 04:59) (83 - 100)  BP: 103/66 (28 Jun 2022 04:59) (100/67 - 107/67)  BP(mean): --  RR: 18 (28 Jun 2022 04:59) (18 - 18)  SpO2: 95% (28 Jun 2022 04:59) (94% - 96%)    Physical exam:  Patient alert and oriented  No distress  CVS: S1, S2 regular or murmur  Chest: bilateral breath sound without rales  Abdomen: soft, not tender, no organomegaly or masses  CNS: No focal neuro deficit  Musculoskeletal:  Normal range of motion  Skin: No rash    Assessment and Plan:   Patient is a 76 year old male w/pmh significant for Afib on Eliquis , HTN ,  HLD , admitted for worsening shortness of breath over the several weeks PTA on 6/14/22  Patient reported previous unlimited exercise tolerance , however, over the past several weeks  he became increasingly dyspneic with exertion , and now  short of breath with minimal activity. Patient reported no chest pain , no palpitations ,  he had some lower extremity edema , no orthopnea and no PND. He reported no cough or fever. Patient was evaluated by his PMD and bloodwork  showed elevated liver tests and decreased renal function. Patient also reported being evaluated by cardiology and echocardiogram reportedly showed no major abnormalities.  Repeat lab  testing showed worsening of renal function and patient was referred to the hospital for further evaluation. Patient reports no dysuria. He reports no alcohol use.   Seen for MRCP findings suggestive of infiltrative neoplasm  Seen by renal and hepatology  S/P paracentesis and liver bx by IR 6/21/22    PAST MEDICAL & SURGICAL HISTORY:  Hyperlipidemia      Atrial fibrillation      Hypertension  S/P hernia repair        Allergies    No Known Allergies    Intolerances      Social History:   , lives with wife   former smoker 2ppd x many years , quit about 25 years ago   no alcohol use (14 Jun 2022 03:05)    Medications:  acetaminophen     Tablet .. 650 milliGRAM(s) Oral every 8 hours PRN Temp greater or equal to 38C (100.4F), Mild Pain (1 - 3)  albumin human 25% IVPB 100 milliLiter(s) IV Intermittent every 6 hours  apixaban 2.5 milliGRAM(s) Oral two times a day  chlorhexidine 2% Cloths 1 Application(s) Topical <User Schedule>  clotrimazole 1% Cream 1 Application(s) Topical two times a day  finasteride 5 milliGRAM(s) Oral daily  melatonin 3 milliGRAM(s) Oral at bedtime PRN Insomnia  ondansetron Injectable 4 milliGRAM(s) IV Push every 8 hours PRN Nausea and/or Vomiting  pantoprazole    Tablet 40 milliGRAM(s) Oral before breakfast  senna 2 Tablet(s) Oral at bedtime PRN Constipation  sodium bicarbonate 650 milliGRAM(s) Oral three times a day    Labs:  CBC Full  -  ( 28 Jun 2022 11:12 )  WBC Count : 16.18 K/uL  RBC Count : 3.25 M/uL  Hemoglobin : 10.0 g/dL  Hematocrit : 29.7 %  Platelet Count - Automated : 187 K/uL  Mean Cell Volume : 91.4 fl  Mean Cell Hemoglobin : 30.8 pg  Mean Cell Hemoglobin Concentration : 33.7 gm/dL  Auto Neutrophil # : x  Auto Lymphocyte # : x  Auto Monocyte # : x  Auto Eosinophil # : x  Auto Basophil # : x  Auto Neutrophil % : x  Auto Lymphocyte % : x  Auto Monocyte % : x  Auto Eosinophil % : x  Auto Basophil % : x    06-28    132<L>  |  92<L>  |  102<H>  ----------------------------<  116<H>  5.7<H>   |  21<L>  |  2.87<H>    Ca    9.8      28 Jun 2022 11:12  Phos  3.5     06-27    TPro  6.4  /  Alb  4.2  /  TBili  11.1<H>  /  DBili  x   /  AST  217<H>  /  ALT  117<H>  /  AlkPhos  713<H>  06-28      Radiology:     < from: US Abdomen Upper Quadrant Right (06.28.22 @ 08:58) >  Liver: Enlarged with nodular contour. Numerous hypoechoic liver lesions.  Bile ducts: Normal caliber. Common bile duct measures 5 mm.  Gallbladder: Contracted.  Pancreas: Within normal limits.  Right kidney: 9.8 cm. No hydronephrosis.  Ascites: Mild ascites.  IVC: Visualized portions are within normal limits.  Pleura: Trace right pleural effusion.  Miscellaneous: 2.8 x 0.9 x 2.4 cm structure in the portacaval space, most   consistent with a lymph node as seen on MR abdomen.    < end of copied text >          ROS:  Patient comfortable without distress  No SOB or chest pain  No palpitation  No abdominal pain, diarrhaea or constipation  No weakness of extremities  No skin changes or swelling of legs  Rest of the comprehensive ROS was negative  Vital Signs Last 24 Hrs  T(C): 36.5 (28 Jun 2022 04:59), Max: 36.5 (28 Jun 2022 04:59)  T(F): 97.7 (28 Jun 2022 04:59), Max: 97.7 (28 Jun 2022 04:59)  HR: 94 (28 Jun 2022 04:59) (83 - 100)  BP: 103/66 (28 Jun 2022 04:59) (100/67 - 107/67)  BP(mean): --  RR: 18 (28 Jun 2022 04:59) (18 - 18)  SpO2: 95% (28 Jun 2022 04:59) (94% - 96%)    Physical exam:  Patient alert and oriented. Icterus +  No distress  CVS: S1, S2   Chest: bilateral breath sound without rales  Abdomen: soft, not tender, no organomegaly or masses  CNS: No focal neuro deficit  Musculoskeletal:  Normal range of motion  Skin: No rash    Assessment and Plan:

## 2022-06-28 NOTE — PROGRESS NOTE ADULT - SUBJECTIVE AND OBJECTIVE BOX
Patient is a 76y old  Male who presents with a chief complaint of ROBERT and elevated liver tests (2022 12:53)      SUBJECTIVE / OVERNIGHT EVENTS: still awaiting liver biopsy result    MEDICATIONS  (STANDING):  albumin human 25% IVPB 100 milliLiter(s) IV Intermittent every 6 hours  apixaban 2.5 milliGRAM(s) Oral two times a day  chlorhexidine 2% Cloths 1 Application(s) Topical <User Schedule>  clotrimazole 1% Cream 1 Application(s) Topical two times a day  finasteride 5 milliGRAM(s) Oral daily  midodrine. 10 milliGRAM(s) Oral three times a day  octreotide  Infusion 50 MICROgram(s)/Hr (10 mL/Hr) IV Continuous <Continuous>  pantoprazole    Tablet 40 milliGRAM(s) Oral before breakfast  sodium bicarbonate 650 milliGRAM(s) Oral three times a day  sodium zirconium cyclosilicate 5 Gram(s) Oral once    MEDICATIONS  (PRN):  acetaminophen     Tablet .. 650 milliGRAM(s) Oral every 8 hours PRN Temp greater or equal to 38C (100.4F), Mild Pain (1 - 3)  lidocaine   4% Patch 1 Patch Transdermal every 24 hours PRN lower back pain  melatonin 3 milliGRAM(s) Oral at bedtime PRN Insomnia  ondansetron Injectable 4 milliGRAM(s) IV Push every 8 hours PRN Nausea and/or Vomiting  senna 2 Tablet(s) Oral at bedtime PRN Constipation      Vital Signs Last 24 Hrs  T(F): 97.7 (22 @ 20:58), Max: 97.7 (22 @ 04:59)  HR: 93 (22 @ 20:58) (91 - 94)  BP: 102/63 (22 @ 20:58) (102/63 - 103/66)  RR: 18 (22 @ 20:58) (18 - 18)  SpO2: 95% (22 @ 20:58) (95% - 95%)  Telemetry:   CAPILLARY BLOOD GLUCOSE        I&O's Summary    2022 07:01  -  2022 07:00  --------------------------------------------------------  IN: 480 mL / OUT: 300 mL / NET: 180 mL    2022 07:01  -  2022 23:44  --------------------------------------------------------  IN: 720 mL / OUT: 525 mL / NET: 195 mL        PHYSICAL EXAM:  GENERAL: NAD, well-developed  HEAD:  Atraumatic, Normocephalic  EYES: EOMI, PERRLA, conjunctiva and sclera clear  NECK: Supple, No JVD  CHEST/LUNG: Clear to auscultation bilaterally; No wheeze  HEART: Regular rate and rhythm; No murmurs, rubs, or gallops  ABDOMEN: Soft, Nontender, Nondistended; Bowel sounds present  EXTREMITIES:  2+ Peripheral Pulses, No clubbing, cyanosis, or edema  PSYCH: AAOx3  NEUROLOGY: non-focal  SKIN: No rashes or lesions    LABS:                        10.0   16.18 )-----------( 187      ( 2022 11:12 )             29.7     06-28    133<L>  |  90<L>  |  99<H>  ----------------------------<  185<H>  4.8   |  19<L>  |  2.80<H>    Ca    10.1      2022 20:12  Phos  3.5     -    TPro  6.8  /  Alb  4.5  /  TBili  11.0<H>  /  DBili  x   /  AST  179<H>  /  ALT  105<H>  /  AlkPhos  675<H>            Urinalysis Basic - ( 2022 15:24 )    Color: Yellow / Appearance: Slightly Turbid / S.015 / pH: x  Gluc: x / Ketone: Negative  / Bili: Small / Urobili: 2 mg/dL   Blood: x / Protein: Trace / Nitrite: Negative   Leuk Esterase: Negative / RBC: 2 /hpf / WBC 0 /HPF   Sq Epi: x / Non Sq Epi: 0 /hpf / Bacteria: Few        RADIOLOGY & ADDITIONAL TESTS:    Imaging Personally Reviewed:    Consultant(s) Notes Reviewed:      Care Discussed with Consultants/Other Providers:

## 2022-06-28 NOTE — PROGRESS NOTE ADULT - ASSESSMENT
76 year old male w/pmh significant for Afib on Eliquis , HTN ,  HLD , presenting for worsening shortness of breath found to have ROBERT on ? CKD and hyperkalemia with metabolic acidosis    1) ROBERT on ?CKD  Cr was rising  again--> ddx includes pre renal vs atn vs r/o HRS-->  Creatinine Trend: 2.87 <--, 2.71 <--, 2.37 <--, 2.13 <--, 2.06 <--, 2.06 <--, 2.41 <--  pt not aware of underlying kidney disease--> may have component of Hypertensive Nephropathy  Renal US --> right kidney 9.8cm and left kidney 9.9cm--> no hydro  Cr rising and Lfts rising--> urine lytes indicating intravascular depletion--> ddx includes pre renal vs hrs  c/w ALBUMIN 25% q6h x 48 hours  start midodrine 10mg po q8h  start octreotide gtt  Informed about worsening renal fxn and rising K--> explained if this does not revervse he may need HD--> consent obtained--> will monitor closely  check hep panel  keep off ARB and HCTZ  avoid nephrotoxins  trend bmp q daily    2) Hyperkalemia-   high k--> possibly 2/2 dec forward flow  s/p lokelma 10 grams  again today  c/w albumin  if does not improve and or gets worse with rising cr then will need HD  low K in  diet added  trend k q daily    3) Metabolic acidosis- improving  insetting of robert/ckd  c/w nabicarb 650mg po tid  trend bicarb    Dr Romero  [phone]

## 2022-06-28 NOTE — PROGRESS NOTE ADULT - SUBJECTIVE AND OBJECTIVE BOX
Patient seen and examined  denies any complaints  cr and lfts rising    No Known Allergies    Hospital Medications:   MEDICATIONS  (STANDING):  albumin human 25% IVPB 100 milliLiter(s) IV Intermittent every 6 hours  ALBUTerol    0.083%. 10 milliGRAM(s) Nebulizer once  apixaban 2.5 milliGRAM(s) Oral two times a day  chlorhexidine 2% Cloths 1 Application(s) Topical <User Schedule>  clotrimazole 1% Cream 1 Application(s) Topical two times a day  finasteride 5 milliGRAM(s) Oral daily  midodrine. 10 milliGRAM(s) Oral three times a day  octreotide  Infusion 50 MICROgram(s)/Hr (10 mL/Hr) IV Continuous <Continuous>  pantoprazole    Tablet 40 milliGRAM(s) Oral before breakfast  sodium bicarbonate 650 milliGRAM(s) Oral three times a day  sodium zirconium cyclosilicate 10 Gram(s) Oral once        VITALS:  T(F): 97.7 (22 @ 04:59), Max: 97.7 (22 @ 04:59)  HR: 94 (22 @ 04:59)  BP: 103/66 (22 @ 04:59)  RR: 18 (22 @ 04:59)  SpO2: 95% (22 @ 04:59)  Wt(kg): --     @ 07:01  -   @ 07:00  --------------------------------------------------------  IN: 480 mL / OUT: 300 mL / NET: 180 mL        PHYSICAL EXAM:  Constitutional: NAD  HEENT: anicteric sclera  Neck: No JVD  Respiratory: CTAB, no wheezes, rales or rhonchi  Cardiovascular: S1, S2, RRR  Gastrointestinal: BS+, soft, NT/ND + ascites  Extremities: +edema  Neurological: A/O x 3  Psychiatric: Normal mood, normal affect    LABS:      132<L>  |  92<L>  |  102<H>  ----------------------------<  116<H>  5.7<H>   |  21<L>  |  2.87<H>    Ca    9.8      2022 11:12  Phos  3.5         TPro  6.4  /  Alb  4.2  /  TBili  11.1<H>  /  DBili      /  AST  217<H>  /  ALT  117<H>  /  AlkPhos  713<H>      Creatinine Trend: 2.87 <--, 2.71 <--, 2.37 <--, 2.13 <--, 2.06 <--, 2.06 <--, 2.41 <--                        10.0   16.18 )-----------( 187      ( 2022 11:12 )             29.7     Urine Studies:  Urinalysis Basic - ( 2022 23:29 )    Color: Dark Yellow / Appearance: Slightly Turbid / S.017 / pH:   Gluc:  / Ketone: Negative  / Bili: Small / Urobili: 3 mg/dL   Blood:  / Protein: Trace / Nitrite: Negative   Leuk Esterase: Negative / RBC: 2 /hpf / WBC 1 /HPF   Sq Epi:  / Non Sq Epi: 0 /hpf / Bacteria: Negative      Potassium, Random Urine: 43 mmol/L ( @ 23:29)  Osmolality, Random Urine: 385 mos/kg ( @ 23:29)  Sodium, Random Urine: 11 mmol/L ( @ 23:29)  Creatinine, Random Urine: 116 mg/dL ( @ 23:29)  Chloride, Random Urine: <20 mmol/L ( @ 23:29)    RADIOLOGY & ADDITIONAL STUDIES:

## 2022-06-29 NOTE — PROGRESS NOTE ADULT - SUBJECTIVE AND OBJECTIVE BOX
Patient is a 76 year old male w/pmh significant for Afib on Eliquis , HTN ,  HLD , admitted for worsening shortness of breath over the several weeks PTA on 6/14/22  Patient reported previous unlimited exercise tolerance , however, over the past several weeks  he became increasingly dyspneic with exertion , and now  short of breath with minimal activity. Patient reported no chest pain , no palpitations ,  he had some lower extremity edema , no orthopnea and no PND. He reported no cough or fever. Patient was evaluated by his PMD and bloodwork  showed elevated liver tests and decreased renal function. Patient also reported being evaluated by cardiology and echocardiogram reportedly showed no major abnormalities.  Repeat lab  testing showed worsening of renal function and patient was referred to the hospital for further evaluation. Patient reports no dysuria. He reports no alcohol use.   Seen for MRCP findings suggestive of infiltrative neoplasm  Seen by renal and hepatology  S/P paracentesis and liver bx by IR 6/21/22  PAST MEDICAL & SURGICAL HISTORY:  Hyperlipidemia      Atrial fibrillation      Hypertension      S/P hernia repair        Allergies    No Known Allergies    Intolerances      Social History:   , lives with wife   former smoker 2ppd x many years , quit about 25 years ago   no alcohol use (14 Jun 2022 03:05)    Medications:  acetaminophen     Tablet .. 650 milliGRAM(s) Oral every 8 hours PRN Temp greater or equal to 38C (100.4F), Mild Pain (1 - 3)  apixaban 2.5 milliGRAM(s) Oral two times a day  chlorhexidine 2% Cloths 1 Application(s) Topical <User Schedule>  clotrimazole 1% Cream 1 Application(s) Topical two times a day  finasteride 5 milliGRAM(s) Oral daily  lidocaine   4% Patch 1 Patch Transdermal every 24 hours PRN lower back pain  melatonin 3 milliGRAM(s) Oral at bedtime PRN Insomnia  midodrine. 10 milliGRAM(s) Oral three times a day  octreotide  Infusion 50 MICROgram(s)/Hr IV Continuous <Continuous>  ondansetron Injectable 4 milliGRAM(s) IV Push every 8 hours PRN Nausea and/or Vomiting  pantoprazole    Tablet 40 milliGRAM(s) Oral before breakfast  senna 2 Tablet(s) Oral at bedtime PRN Constipation  sodium bicarbonate 650 milliGRAM(s) Oral three times a day  sodium zirconium cyclosilicate 5 Gram(s) Oral once    Labs:  CBC Full  -  ( 29 Jun 2022 07:16 )  WBC Count : 13.34 K/uL  RBC Count : 3.00 M/uL  Hemoglobin : 9.2 g/dL  Hematocrit : 27.9 %  Platelet Count - Automated : 170 K/uL  Mean Cell Volume : 93.0 fl  Mean Cell Hemoglobin : 30.7 pg  Mean Cell Hemoglobin Concentration : 33.0 gm/dL  Auto Neutrophil # : x  Auto Lymphocyte # : x  Auto Monocyte # : x  Auto Eosinophil # : x  Auto Basophil # : x  Auto Neutrophil % : x  Auto Lymphocyte % : x  Auto Monocyte % : x  Auto Eosinophil % : x  Auto Basophil % : x    06-29    132<L>  |  91<L>  |  101<H>  ----------------------------<  123<H>  4.6   |  20<L>  |  2.77<H>    Ca    9.9      29 Jun 2022 07:16  Phos  4.0     06-29  Mg     2.2     06-29    TPro  6.6  /  Alb  4.5  /  TBili  10.3<H>  /  DBili  x   /  AST  133<H>  /  ALT  86<H>  /  AlkPhos  543<H>  06-29      Radiology:             ROS:  Patient comfortable without distress  No SOB or chest pain  No palpitation  No abdominal pain, diarrhaea or constipation  No weakness of extremities  No skin changes or swelling of legs  Rest of the comprehensive ROS was negative  Vital Signs Last 24 Hrs  T(C): 36.6 (29 Jun 2022 04:58), Max: 36.6 (29 Jun 2022 04:58)  T(F): 97.8 (29 Jun 2022 04:58), Max: 97.8 (29 Jun 2022 04:58)  HR: 93 (29 Jun 2022 04:58) (91 - 93)  BP: 107/64 (29 Jun 2022 04:58) (102/63 - 107/64)  BP(mean): --  RR: 18 (29 Jun 2022 04:58) (18 - 18)  SpO2: 93% (29 Jun 2022 04:58) (93% - 95%)    Physical exam:  Patient alert and oriented  No distress, icterus  CVS: S1, S2   Chest: bilateral breath sound without rales  Abdomen: soft, not tender, no organomegaly or masses  CNS: No focal neuro deficit  Musculoskeletal:  Normal range of motion  Skin: No rash    Assessment and Plan:

## 2022-06-29 NOTE — PROGRESS NOTE ADULT - ASSESSMENT
76 year old male w/pmh significant for Afib on Eliquis , HTN ,  HLD , presenting for worsening shortness of breath found to have ROBERT on ? CKD and hyperkalemia with metabolic acidosis    1) ROBERT on ?CKD  Cr was rising  again--> ddx includes pre renal vs atn vs r/o HRS-->  Creatinine Trend: 2.87 <--, 2.71 <--, 2.37 <--, 2.13 <--, 2.06 <--, 2.06 <--, 2.41 <--  pt not aware of underlying kidney disease--> may have component of Hypertensive Nephropathy  Renal US --> right kidney 9.8cm and left kidney 9.9cm--> no hydro  Cr rising and Lfts rising--> urine lytes indicating intravascular depletion--> ddx includes pre renal vs hrs  c/w ALBUMIN 25% q6h  for another 24 hours--> ( pt has been on it for the past 2 days)  c/w midodrine 10mg po q8h  c/w octreotide gtt  Informed about worsening renal fxn --> explained if this does not reverse he may need HD--> consent obtained--> will monitor closely  check hep panel  keep off ARB and HCTZ  avoid nephrotoxins  trend bmp q daily    2) Hyperkalemia-   high k--> possibly 2/2 dec forward flow  on lokelma  c/w albumin  low K in  diet added  trend k q daily    3) Metabolic acidosis- improving  insetting of robert/ckd  c/w nabicarb 650mg po tid  trend bicarb    Dr Romero  [phone]

## 2022-06-29 NOTE — PROGRESS NOTE ADULT - ASSESSMENT
75yo M with PMH of Afib (on Eliquis), HTN, HLD admitted with worsening dyspnea. Seen for imaging suggestive of cirrhosis/infiltrative disease. Has elevated LFT' and ROBERT on ? CKD, seen by nephrology    Hep profile negative, tumor markers except for Ca 19-9 normal   Diagnostic paracentesis done, earlier was negative for malignant cells  < from: MR Abdomen w/ IV Cont (06.17.22 @ 11:15) >  IMPRESSION:  Diffusely infiltrated masses in the liver which may represent metastatic   disease or multifocal HCC. Left portal vein is not visualized and may be   involved. 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 Soft   tissue density in the left peritoneal cavity may represent additional   areas of disease and can be better evaluated on CT.      Liver bx with paracentesis done 6/21/22, will follow result, will try to get update from pathologist  Bilirubin is increasing likely from progressive infiltrative disease, no ductal dilation on repeat US, hepatology note noted  Management planning will depend on the result of bx, keeping in mind his LFT's and PS.  Discussed with patient and family at bedside

## 2022-06-29 NOTE — PROGRESS NOTE ADULT - SUBJECTIVE AND OBJECTIVE BOX
Patient seen and examined  no complaints  denies any sob or cp    No Known Allergies    Hospital Medications:   MEDICATIONS  (STANDING):  albumin human 25% IVPB 100 milliLiter(s) IV Intermittent every 6 hours  apixaban 2.5 milliGRAM(s) Oral two times a day  chlorhexidine 2% Cloths 1 Application(s) Topical <User Schedule>  clotrimazole 1% Cream 1 Application(s) Topical two times a day  finasteride 5 milliGRAM(s) Oral daily  midodrine. 10 milliGRAM(s) Oral three times a day  octreotide  Infusion 50 MICROgram(s)/Hr (10 mL/Hr) IV Continuous <Continuous>  pantoprazole    Tablet 40 milliGRAM(s) Oral before breakfast  sodium bicarbonate 650 milliGRAM(s) Oral three times a day  sodium zirconium cyclosilicate 5 Gram(s) Oral once      VITALS:  T(F): 97.7 (22 @ 11:44), Max: 97.8 (22 @ 04:58)  HR: 92 (22 @ 11:44)  BP: 116/73 (22 @ 11:44)  RR: 18 (22 @ 04:58)  SpO2: 94% (22 @ 11:44)  Wt(kg): --     @ 07:  -   @ 07:00  --------------------------------------------------------  IN: 720 mL / OUT: 725 mL / NET: -5 mL     @ 07:  -   @ 12:59  --------------------------------------------------------  IN: 0 mL / OUT: 200 mL / NET: -200 mL      PHYSICAL EXAM:  Constitutional: NAD  HEENT: anicteric sclera  Neck: No JVD  Respiratory: CTAB, no wheezes, rales or rhonchi  Cardiovascular: S1, S2, RRR  Gastrointestinal: BS+, soft, NT/ND + ascites  Extremities: +edema  Neurological: A/O x 3  Psychiatric: Normal mood, normal affect    LABS:      132<L>  |  91<L>  |  101<H>  ----------------------------<  123<H>  4.6   |  20<L>  |  2.77<H>    Ca    9.9      2022 07:16  Phos  4.0       Mg     2.2         TPro  6.6  /  Alb  4.5  /  TBili  10.3<H>  /  DBili      /  AST  133<H>  /  ALT  86<H>  /  AlkPhos  543<H>      Creatinine Trend: 2.77 <--, 2.80 <--, 2.87 <--, 2.71 <--, 2.37 <--, 2.13 <--, 2.06 <--, 2.06 <--                        9.2    13.34 )-----------( 170      ( 2022 07:16 )             27.9     Urine Studies:  Urinalysis Basic - ( 2022 15:24 )    Color: Yellow / Appearance: Slightly Turbid / S.015 / pH:   Gluc:  / Ketone: Negative  / Bili: Small / Urobili: 2 mg/dL   Blood:  / Protein: Trace / Nitrite: Negative   Leuk Esterase: Negative / RBC: 2 /hpf / WBC 0 /HPF   Sq Epi:  / Non Sq Epi: 0 /hpf / Bacteria: Few      Potassium, Random Urine: 37 mmol/L ( @ 15:26)  Osmolality, Random Urine: 397 mos/kg ( @ 15:26)  Sodium, Random Urine: 9 mmol/L ( @ 15:26)  Creatinine, Random Urine: 72 mg/dL ( @ 15:26)  Chloride, Random Urine: <20 mmol/L ( @ 15:26)  Potassium, Random Urine: 43 mmol/L ( @ 23:29)  Osmolality, Random Urine: 385 mos/kg ( @ 23:29)  Sodium, Random Urine: 11 mmol/L ( @ 23:29)  Creatinine, Random Urine: 116 mg/dL ( @ 23:29)  Chloride, Random Urine: <20 mmol/L ( @ 23:29)    RADIOLOGY & ADDITIONAL STUDIES:

## 2022-06-29 NOTE — PROGRESS NOTE ADULT - SUBJECTIVE AND OBJECTIVE BOX
Patient is a 76y old  Male who presents with a chief complaint of ROBERT and elevated liver tests (2022 12:59)      SUBJECTIVE / OVERNIGHT EVENTS:    Events noted.   CONSTITUTIONAL: No fever,  or fatigue  RESPIRATORY: No cough, wheezing,  No shortness of breath  CARDIOVASCULAR: No chest pain, palpitations, dizziness, or leg swelling  GASTROINTESTINAL: No abdominal or epigastric pain.       MEDICATIONS  (STANDING):  albumin human 25% IVPB 100 milliLiter(s) IV Intermittent every 6 hours  apixaban 2.5 milliGRAM(s) Oral two times a day  chlorhexidine 2% Cloths 1 Application(s) Topical <User Schedule>  clotrimazole 1% Cream 1 Application(s) Topical two times a day  finasteride 5 milliGRAM(s) Oral daily  midodrine. 10 milliGRAM(s) Oral three times a day  octreotide  Infusion 50 MICROgram(s)/Hr (10 mL/Hr) IV Continuous <Continuous>  pantoprazole    Tablet 40 milliGRAM(s) Oral before breakfast  sodium bicarbonate 650 milliGRAM(s) Oral three times a day  sodium zirconium cyclosilicate 5 Gram(s) Oral once    MEDICATIONS  (PRN):  acetaminophen     Tablet .. 650 milliGRAM(s) Oral every 8 hours PRN Temp greater or equal to 38C (100.4F), Mild Pain (1 - 3)  lidocaine   4% Patch 1 Patch Transdermal every 24 hours PRN lower back pain  melatonin 3 milliGRAM(s) Oral at bedtime PRN Insomnia  ondansetron Injectable 4 milliGRAM(s) IV Push every 8 hours PRN Nausea and/or Vomiting  senna 2 Tablet(s) Oral at bedtime PRN Constipation        CAPILLARY BLOOD GLUCOSE        I&O's Summary    2022 07:  -  2022 07:00  --------------------------------------------------------  IN: 720 mL / OUT: 725 mL / NET: -5 mL    2022 07:01  -  2022 22:00  --------------------------------------------------------  IN: 0 mL / OUT: 530 mL / NET: -530 mL        T(C): 36.3 (22 @ 21:22), Max: 36.6 (22 @ 04:58)  HR: 91 (22 @ 21:22) (91 - 93)  BP: 113/72 (22 @ 21:22) (107/64 - 116/73)  RR: 18 (22 @ 21:22) (18 - 19)  SpO2: 93% (22 @ 21:22) (93% - 94%)    PHYSICAL EXAM:    NECK: Supple, No JVD  CHEST/LUNG: Clear to auscultation bilaterally; No wheezing.  HEART: Regular rate and rhythm; No murmurs, rubs, or gallops  ABDOMEN: Soft, distended  EXTREMITIES:   No edema  NEUROLOGY: AAO       LABS:                        9.2    13.34 )-----------( 170      ( 2022 07:16 )             27.9         132<L>  |  91<L>  |  101<H>  ----------------------------<  123<H>  4.6   |  20<L>  |  2.77<H>    Ca    9.9      2022 07:16  Phos  4.0       Mg     2.2         TPro  6.6  /  Alb  4.5  /  TBili  10.3<H>  /  DBili  x   /  AST  133<H>  /  ALT  86<H>  /  AlkPhos  543<H>      PT/INR - ( 2022 07:16 )   PT: 38.6 sec;   INR: 3.32 ratio               Urinalysis Basic - ( 2022 15:24 )    Color: Yellow / Appearance: Slightly Turbid / S.015 / pH: x  Gluc: x / Ketone: Negative  / Bili: Small / Urobili: 2 mg/dL   Blood: x / Protein: Trace / Nitrite: Negative   Leuk Esterase: Negative / RBC: 2 /hpf / WBC 0 /HPF   Sq Epi: x / Non Sq Epi: 0 /hpf / Bacteria: Few      CAPILLARY BLOOD GLUCOSE            RADIOLOGY & ADDITIONAL TESTS:    Imaging Personally Reviewed:    Consultant(s) Notes Reviewed:      Care Discussed with Consultants/Other Providers:    Matheus Clarke MD, CMD, FACP    [id number] [address]  Office Tel: [phone]  Cell: [phone]

## 2022-06-30 NOTE — PROGRESS NOTE ADULT - SUBJECTIVE AND OBJECTIVE BOX
Patient is a 76y old  Male who presents with a chief complaint of ROBERT and elevated liver tests (2022 12:59)      SUBJECTIVE / OVERNIGHT EVENTS:    Events noted.   CONSTITUTIONAL: No fever,  or fatigue  RESPIRATORY: No cough, wheezing,  No shortness of breath  CARDIOVASCULAR: No chest pain, palpitations, dizziness, or leg swelling  GASTROINTESTINAL: No abdominal or epigastric pain.       MEDICATIONS  (STANDING):  albumin human 25% IVPB 100 milliLiter(s) IV Intermittent every 6 hours  apixaban 2.5 milliGRAM(s) Oral two times a day  chlorhexidine 2% Cloths 1 Application(s) Topical <User Schedule>  clotrimazole 1% Cream 1 Application(s) Topical two times a day  finasteride 5 milliGRAM(s) Oral daily  midodrine. 10 milliGRAM(s) Oral three times a day  octreotide  Infusion 50 MICROgram(s)/Hr (10 mL/Hr) IV Continuous <Continuous>  pantoprazole    Tablet 40 milliGRAM(s) Oral before breakfast  sodium bicarbonate 650 milliGRAM(s) Oral three times a day  sodium zirconium cyclosilicate 5 Gram(s) Oral once    MEDICATIONS  (PRN):  acetaminophen     Tablet .. 650 milliGRAM(s) Oral every 8 hours PRN Temp greater or equal to 38C (100.4F), Mild Pain (1 - 3)  lidocaine   4% Patch 1 Patch Transdermal every 24 hours PRN lower back pain  melatonin 3 milliGRAM(s) Oral at bedtime PRN Insomnia  ondansetron Injectable 4 milliGRAM(s) IV Push every 8 hours PRN Nausea and/or Vomiting  senna 2 Tablet(s) Oral at bedtime PRN Constipation        CAPILLARY BLOOD GLUCOSE        I&O's Summary    2022 07:  -  2022 07:00  --------------------------------------------------------  IN: 720 mL / OUT: 725 mL / NET: -5 mL    2022 07:01  -  2022 22:00  --------------------------------------------------------  IN: 0 mL / OUT: 530 mL / NET: -530 mL        T(C): 36.3 (22 @ 21:22), Max: 36.6 (22 @ 04:58)  HR: 91 (22 @ 21:22) (91 - 93)  BP: 113/72 (22 @ 21:22) (107/64 - 116/73)  RR: 18 (22 @ 21:22) (18 - 19)  SpO2: 93% (22 @ 21:22) (93% - 94%)    PHYSICAL EXAM:    NECK: Supple, No JVD  CHEST/LUNG: Clear to auscultation bilaterally; No wheezing.  HEART: Regular rate and rhythm; No murmurs, rubs, or gallops  ABDOMEN: Soft, distended  EXTREMITIES:   No edema  NEUROLOGY: AAO       LABS:                        9.2    13.34 )-----------( 170      ( 2022 07:16 )             27.9         132<L>  |  91<L>  |  101<H>  ----------------------------<  123<H>  4.6   |  20<L>  |  2.77<H>    Ca    9.9      2022 07:16  Phos  4.0       Mg     2.2         TPro  6.6  /  Alb  4.5  /  TBili  10.3<H>  /  DBili  x   /  AST  133<H>  /  ALT  86<H>  /  AlkPhos  543<H>      PT/INR - ( 2022 07:16 )   PT: 38.6 sec;   INR: 3.32 ratio               Urinalysis Basic - ( 2022 15:24 )    Color: Yellow / Appearance: Slightly Turbid / S.015 / pH: x  Gluc: x / Ketone: Negative  / Bili: Small / Urobili: 2 mg/dL   Blood: x / Protein: Trace / Nitrite: Negative   Leuk Esterase: Negative / RBC: 2 /hpf / WBC 0 /HPF   Sq Epi: x / Non Sq Epi: 0 /hpf / Bacteria: Few      CAPILLARY BLOOD GLUCOSE            RADIOLOGY & ADDITIONAL TESTS:    Imaging Personally Reviewed:    Consultant(s) Notes Reviewed:      Care Discussed with Consultants/Other Providers:    Matheus Clarke MD, CMD, FACP    [address] [address]  Office Tel: [phone]  Cell: [phone]

## 2022-06-30 NOTE — PROGRESS NOTE ADULT - ASSESSMENT
I 77yo M with PMH of Afib (on Eliquis), HTN, HLD admitted with worsening dyspnea. Was seen for imaging suggestive of cirrhosis/infiltrative disease. Had elevated LFT' and ROBERT on ? CKD, seen by nephrology    Hep profile negative, tumor markers except for Ca 19-9 normal   Diagnostic paracentesis done, earlier was negative for malignant cells  < from: MR Abdomen w/ IV Cont (06.17.22 @ 11:15) >  IMPRESSION:  Diffusely infiltrated masses in the liver which may represent metastatic   disease or multifocal HCC. Left portal vein is not visualized and may be   involved. 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 Soft   tissue density in the left peritoneal cavity may represent additional   areas of disease and can be better evaluated on CT.      Liver bx with paracentesis done 6/21/22, result adenocarcinoma, immunostains pending  Bilirubin is increased likely from progressive infiltrative disease, no ductal dilation on repeat US, hepatology note noted  I discussed dx with patient's wife after discussing with the patient  I told her clinically his disease is likely to be from bile ducts.   She was told that given his bilirubin and to some extent kidney function, we will be very restricted in choice of drugs to use for his disease,  His PS is poor and thus tolerance to treatment will be poor.   Treatment as it is palliative not curative.  Given all this palliative care is a viable option.   She is ok with palliative care input in our patient with very poor prognosis

## 2022-06-30 NOTE — PROGRESS NOTE ADULT - SUBJECTIVE AND OBJECTIVE BOX
HPI:  Patient is a 76 year old male w/pmh significant for Afib on Eliquis , HTN ,  HLD , presenting for worsening shortness of breath over the past several weeks.   Patient reports previous unlimited exercise tolerance , however, over the past several weeks  he became increasingly dyspneic with exertion , and now  short of breath with minimal activity. Patient reports no chest pain , no palpitations ,  he has some lower extremity edema , no orthopnea and no PND. He reports no cough or fever. Patient was evaluated by his PMD and bloodwork  showed elevated liver tests and decreased renal function. Patient also reports being evaluated by cardiology and echocardiogram reportedly showed no major abnormalities.  Repeat lab  testing showed worsening of renal function and patient was referred to the hospital for further evaluation. Patient reports no dysuria. He reports no alcohol use.      (14 Jun 2022 03:05)    PAST MEDICAL & SURGICAL HISTORY:  Hyperlipidemia      Atrial fibrillation      Hypertension      S/P hernia repair        Allergies    No Known Allergies    Intolerances      Social History:   , lives with wife   former smoker 2ppd x many years , quit about 25 years ago   no alcohol use (14 Jun 2022 03:05)    Medications:  acetaminophen     Tablet .. 650 milliGRAM(s) Oral every 8 hours PRN Temp greater or equal to 38C (100.4F), Mild Pain (1 - 3)  albumin human 25% IVPB 100 milliLiter(s) IV Intermittent every 6 hours  apixaban 2.5 milliGRAM(s) Oral two times a day  chlorhexidine 2% Cloths 1 Application(s) Topical <User Schedule>  clotrimazole 1% Cream 1 Application(s) Topical two times a day  finasteride 5 milliGRAM(s) Oral daily  lidocaine   4% Patch 1 Patch Transdermal every 24 hours PRN lower back pain  melatonin 3 milliGRAM(s) Oral at bedtime PRN Insomnia  midodrine. 10 milliGRAM(s) Oral three times a day  octreotide  Infusion 50 MICROgram(s)/Hr IV Continuous <Continuous>  ondansetron Injectable 4 milliGRAM(s) IV Push every 8 hours PRN Nausea and/or Vomiting  pantoprazole    Tablet 40 milliGRAM(s) Oral before breakfast  senna 2 Tablet(s) Oral at bedtime PRN Constipation  sodium bicarbonate 650 milliGRAM(s) Oral three times a day  sodium zirconium cyclosilicate 10 Gram(s) Oral two times a day    Labs:  CBC Full  -  ( 30 Jun 2022 08:40 )  WBC Count : 16.89 K/uL  RBC Count : 3.32 M/uL  Hemoglobin : 10.1 g/dL  Hematocrit : 31.1 %  Platelet Count - Automated : 223 K/uL  Mean Cell Volume : 93.7 fl  Mean Cell Hemoglobin : 30.4 pg  Mean Cell Hemoglobin Concentration : 32.5 gm/dL  Auto Neutrophil # : x  Auto Lymphocyte # : x  Auto Monocyte # : x  Auto Eosinophil # : x  Auto Basophil # : x  Auto Neutrophil % : x  Auto Lymphocyte % : x  Auto Monocyte % : x  Auto Eosinophil % : x  Auto Basophil % : x    06-30    130<L>  |  89<L>  |  109<H>  ----------------------------<  133<H>  5.6<H>   |  19<L>  |  3.08<H>    Ca    10.2      30 Jun 2022 08:40  Phos  4.0     06-29  Mg     2.2     06-29    TPro  7.0  /  Alb  4.8  /  TBili  12.1<H>  /  DBili  x   /  AST  90<H>  /  ALT  67<H>  /  AlkPhos  419<H>  06-30  Cancer Antigen, GI Ca 19-9: 1602: Test Repeated   Carcinoembryonic Antigen (06.16.22 @ 07:43)   Carcinoembryonic Antigen: 4.8: Method: Roche Electrochemiluminescence Immuno Assay   Alpha Fetoprotein - Tumor Marker: 6.2: Method: Roche Electrochemiluminescence Immuno Assay   Final Diagnosis   LIVER, RIGHT LOBE, US GUIDED CORE BIOPSY AND FNA   POSITIVE FOR MALIGNANT CELLS.   Cytomorphologic findings favor adenocarcinoma.   Cytology, core biopsy smears and cell block show a specimen composed of   two cell population, comprising of hepatocytes with mild reactive changes   and a second population of smaller ductal epithelial cells, some with   mucin.   Core 1C shows a small focus of adenocarcinoma involving liver parenchyma.   Core 1B shows predominantly benign liver parenchyma.   Immunohistochemical stains performed on block 1C. The hepatocytes are   positive for Arginase. Reticulin stain, CD34 and iron are unremarkable.   Additional immunostains ( CDX2, CK7 and CK 20 ) and deeper sections of   core biopsy block 1B are in progress. Results will be reported in an   addendum.  The cytomorphology favors an adenocarcinoma of primary GI/pancreatobiliary   ( including cholangiocarcinoma) origin, amongst other possibilities.   Results of immunostains will be reported in an addendum.     Radiology:             ROS:  Patient comfortable without distress  No SOB or chest pain  No palpitation  No abdominal pain, diarrhaea or constipation  No weakness of extremities  No skin changes or swelling of legs  Rest of the comprehensive ROS was negative  Vital Signs Last 24 Hrs  T(C): 36.3 (30 Jun 2022 12:25), Max: 36.4 (30 Jun 2022 05:05)  T(F): 97.4 (30 Jun 2022 12:25), Max: 97.5 (30 Jun 2022 05:05)  HR: 88 (30 Jun 2022 12:25) (88 - 91)  BP: 109/74 (30 Jun 2022 12:25) (103/67 - 113/72)  BP(mean): --  RR: 17 (30 Jun 2022 12:25) (17 - 18)  SpO2: 93% (30 Jun 2022 12:25) (92% - 93%)    Physical exam:  Patient alert and oriented  No distress  CVS: S1, S2 regular or murmur  Chest: bilateral breath sound without rales  Abdomen: soft, not tender, no organomegaly or masses  CNS: No focal neuro deficit  Musculoskeletal:  Normal range of motion  Skin: No rash    Assessment and Plan: Patient is a 76 year old male w/pmh significant for Afib on Eliquis , HTN ,  HLD , admitted for worsening shortness of breath over the several weeks PTA on 6/14/22  Patient reported previous unlimited exercise tolerance , however, over the past several weeks  he became increasingly dyspneic with exertion , and now  short of breath with minimal activity. Patient reported no chest pain , no palpitations ,  he had some lower extremity edema , no orthopnea and no PND. He reported no cough or fever. Patient was evaluated by his PMD and bloodwork  showed elevated liver tests and decreased renal function. Patient also reported being evaluated by cardiology and echocardiogram reportedly showed no major abnormalities.  Repeat lab  testing showed worsening of renal function and patient was referred to the hospital for further evaluation. Patient reports no dysuria. He reports no alcohol use.   Seen for MRCP findings suggestive of infiltrative neoplasm  Seen by renal and hepatology  S/P paracentesis and liver bx by IR 6/21/22  PAST MEDICAL & SURGICAL HISTORY:  Hyperlipidemia      Atrial fibrillation      Hypertension      S/P hernia repair        Allergies    No Known Allergies    Intolerances      Social History:   , lives with wife   former smoker 2ppd x many years , quit about 25 years ago   no alcohol use (14 Jun 2022 03:05)    Medications:  acetaminophen     Tablet .. 650 milliGRAM(s) Oral every 8 hours PRN Temp greater or equal to 38C (100.4F), Mild Pain (1 - 3)  albumin human 25% IVPB 100 milliLiter(s) IV Intermittent every 6 hours  apixaban 2.5 milliGRAM(s) Oral two times a day  chlorhexidine 2% Cloths 1 Application(s) Topical <User Schedule>  clotrimazole 1% Cream 1 Application(s) Topical two times a day  finasteride 5 milliGRAM(s) Oral daily  lidocaine   4% Patch 1 Patch Transdermal every 24 hours PRN lower back pain  melatonin 3 milliGRAM(s) Oral at bedtime PRN Insomnia  midodrine. 10 milliGRAM(s) Oral three times a day  octreotide  Infusion 50 MICROgram(s)/Hr IV Continuous <Continuous>  ondansetron Injectable 4 milliGRAM(s) IV Push every 8 hours PRN Nausea and/or Vomiting  pantoprazole    Tablet 40 milliGRAM(s) Oral before breakfast  senna 2 Tablet(s) Oral at bedtime PRN Constipation  sodium bicarbonate 650 milliGRAM(s) Oral three times a day  sodium zirconium cyclosilicate 10 Gram(s) Oral two times a day    Labs:  CBC Full  -  ( 30 Jun 2022 08:40 )  WBC Count : 16.89 K/uL  RBC Count : 3.32 M/uL  Hemoglobin : 10.1 g/dL  Hematocrit : 31.1 %  Platelet Count - Automated : 223 K/uL  Mean Cell Volume : 93.7 fl  Mean Cell Hemoglobin : 30.4 pg  Mean Cell Hemoglobin Concentration : 32.5 gm/dL  Auto Neutrophil # : x  Auto Lymphocyte # : x  Auto Monocyte # : x  Auto Eosinophil # : x  Auto Basophil # : x  Auto Neutrophil % : x  Auto Lymphocyte % : x  Auto Monocyte % : x  Auto Eosinophil % : x  Auto Basophil % : x    06-30    130<L>  |  89<L>  |  109<H>  ----------------------------<  133<H>  5.6<H>   |  19<L>  |  3.08<H>    Ca    10.2      30 Jun 2022 08:40  Phos  4.0     06-29  Mg     2.2     06-29    TPro  7.0  /  Alb  4.8  /  TBili  12.1<H>  /  DBili  x   /  AST  90<H>  /  ALT  67<H>  /  AlkPhos  419<H>  06-30  Cancer Antigen, GI Ca 19-9: 1602: Test Repeated   Carcinoembryonic Antigen (06.16.22 @ 07:43)   Carcinoembryonic Antigen: 4.8: Method: Roche Electrochemiluminescence Immuno Assay   Alpha Fetoprotein - Tumor Marker: 6.2: Method: Roche Electrochemiluminescence Immuno Assay   Final Diagnosis   LIVER, RIGHT LOBE, US GUIDED CORE BIOPSY AND FNA   POSITIVE FOR MALIGNANT CELLS.   Cytomorphologic findings favor adenocarcinoma.   Cytology, core biopsy smears and cell block show a specimen composed of   two cell population, comprising of hepatocytes with mild reactive changes   and a second population of smaller ductal epithelial cells, some with   mucin.   Core 1C shows a small focus of adenocarcinoma involving liver parenchyma.   Core 1B shows predominantly benign liver parenchyma.   Immunohistochemical stains performed on block 1C. The hepatocytes are   positive for Arginase. Reticulin stain, CD34 and iron are unremarkable.   Additional immunostains ( CDX2, CK7 and CK 20 ) and deeper sections of   core biopsy block 1B are in progress. Results will be reported in an   addendum.  The cytomorphology favors an adenocarcinoma of primary GI/pancreatobiliary   ( including cholangiocarcinoma) origin, amongst other possibilities.   Results of immunostains will be reported in an addendum.     Radiology:             ROS:  Patient comfortable without distress  No SOB or chest pain  No palpitation  No abdominal pain, diarrhaea or constipation  No weakness of extremities  No skin changes or swelling of legs  Rest of the comprehensive ROS was negative  Vital Signs Last 24 Hrs  T(C): 36.3 (30 Jun 2022 12:25), Max: 36.4 (30 Jun 2022 05:05)  T(F): 97.4 (30 Jun 2022 12:25), Max: 97.5 (30 Jun 2022 05:05)  HR: 88 (30 Jun 2022 12:25) (88 - 91)  BP: 109/74 (30 Jun 2022 12:25) (103/67 - 113/72)  BP(mean): --  RR: 17 (30 Jun 2022 12:25) (17 - 18)  SpO2: 93% (30 Jun 2022 12:25) (92% - 93%)    Physical exam:  Patient alert and oriented  No distress  Generally weak  CVS: S1, S2   Chest: bilateral breath sound without rales  Abdomen: soft, not tender, no organomegaly or masses  CNS: No focal neuro deficit  Musculoskeletal:  Normal range of motion  Skin: No rash    Assessment and Plan:

## 2022-06-30 NOTE — PROGRESS NOTE ADULT - ASSESSMENT
76 year old male w/pmh significant for Afib on Eliquis , HTN ,  HLD , presenting for worsening shortness of breath found to have ROBERT on ? CKD and hyperkalemia with metabolic acidosis    1) ROBERT on ?CKD  Cr was rising  again--> ddx includes pre renal vs atn vs r/o HRS-->  Creatinine Trend: 2.87 <--, 2.71 <--, 2.37 <--, 2.13 <--, 2.06 <--, 2.06 <--, 2.41 <--  pt not aware of underlying kidney disease--> may have component of Hypertensive Nephropathy  Renal US --> right kidney 9.8cm and left kidney 9.9cm--> no hydro  Cr rising and Lfts rising--> urine lytes indicating intravascular depletion--> ddx includes pre renal vs hrs  c/w ALBUMIN 25% q6h  for another 48 hours  c/w midodrine 10mg po q8h  c/w octreotide gtt  Kidney function continues to worsen. IF pt does not have much improvement then will need HD--> family initially wanted HD however given liver biopsy likely showing Carcinoma they are re-considering if to pursue HD  keep off ARB and HCTZ  avoid nephrotoxins  trend bmp q daily    2) Hyperkalemia-   high k--> possibly 2/2 dec forward flow  on lokelma  c/w albumin  low K in  diet added  trend k q daily    3) Metabolic acidosis- improving  insetting of robert/ckd  c/w nabicarb 650mg po tid  trend bicarb    Dr Romero  [phone]

## 2022-06-30 NOTE — PROGRESS NOTE ADULT - SUBJECTIVE AND OBJECTIVE BOX
Patient seen and examined  complaints of swelling    No Known Allergies    Hospital Medications:   MEDICATIONS  (STANDING):  albumin human 25% IVPB 100 milliLiter(s) IV Intermittent every 6 hours  apixaban 2.5 milliGRAM(s) Oral two times a day  chlorhexidine 2% Cloths 1 Application(s) Topical <User Schedule>  clotrimazole 1% Cream 1 Application(s) Topical two times a day  finasteride 5 milliGRAM(s) Oral daily  midodrine. 10 milliGRAM(s) Oral three times a day  octreotide  Infusion 50 MICROgram(s)/Hr (10 mL/Hr) IV Continuous <Continuous>  pantoprazole    Tablet 40 milliGRAM(s) Oral before breakfast  sodium bicarbonate 650 milliGRAM(s) Oral three times a day  sodium zirconium cyclosilicate 10 Gram(s) Oral two times a day        VITALS:  T(F): 97.4 (22 @ 12:25), Max: 97.5 (22 @ 05:05)  HR: 88 (22 @ 12:25)  BP: 109/74 (22 @ 12:25)  RR: 17 (22 @ 12:25)  SpO2: 93% (22 @ 12:25)  Wt(kg): --     @ 07:  -   @ 07:00  --------------------------------------------------------  IN: 0 mL / OUT: 530 mL / NET: -530 mL     @ 07:  -   @ 16:32  --------------------------------------------------------  IN: 240 mL / OUT: 0 mL / NET: 240 mL        PHYSICAL EXAM:  Constitutional: NAD  HEENT: anicteric sclera  Neck: No JVD  Respiratory: CTAB, no wheezes, rales or rhonchi  Cardiovascular: S1, S2, RRR  Gastrointestinal: BS+, soft, NT/ND + ascites  Extremities: +edema  Neurological: A/O x 3  Psychiatric: Normal mood, normal affect    LABS:      130<L>  |  89<L>  |  109<H>  ----------------------------<  133<H>  5.6<H>   |  19<L>  |  3.08<H>    Ca    10.2      2022 08:40  Phos  4.0       Mg     2.2         TPro  7.0  /  Alb  4.8  /  TBili  12.1<H>  /  DBili      /  AST  90<H>  /  ALT  67<H>  /  AlkPhos  419<H>      Creatinine Trend: 3.08 <--, 2.77 <--, 2.80 <--, 2.87 <--, 2.71 <--, 2.37 <--, 2.13 <--, 2.06 <--                        10.1   16.89 )-----------( 223      ( 2022 08:40 )             31.1     Urine Studies:  Urinalysis Basic - ( 2022 15:24 )    Color: Yellow / Appearance: Slightly Turbid / S.015 / pH:   Gluc:  / Ketone: Negative  / Bili: Small / Urobili: 2 mg/dL   Blood:  / Protein: Trace / Nitrite: Negative   Leuk Esterase: Negative / RBC: 2 /hpf / WBC 0 /HPF   Sq Epi:  / Non Sq Epi: 0 /hpf / Bacteria: Few      Potassium, Random Urine: 37 mmol/L ( @ 15:26)  Osmolality, Random Urine: 397 mos/kg ( @ 15:26)  Sodium, Random Urine: 9 mmol/L ( @ 15:26)  Creatinine, Random Urine: 72 mg/dL ( @ 15:26)  Chloride, Random Urine: <20 mmol/L ( @ 15:26)  Potassium, Random Urine: 43 mmol/L ( @ 23:29)  Osmolality, Random Urine: 385 mos/kg ( @ 23:29)  Sodium, Random Urine: 11 mmol/L ( @ 23:29)  Creatinine, Random Urine: 116 mg/dL ( @ 23:29)  Chloride, Random Urine: <20 mmol/L ( @ 23:29)    RADIOLOGY & ADDITIONAL STUDIES:

## 2022-07-01 NOTE — PROGRESS NOTE ADULT - SUBJECTIVE AND OBJECTIVE BOX
Patient is a 76y old  Male who presents with a chief complaint of ROBERT and elevated liver tests (01 Jul 2022 14:28)      SUBJECTIVE / OVERNIGHT EVENTS:    Events noted.  CONSTITUTIONAL: No fever,  or fatigue  RESPIRATORY: No cough, wheezing,  No shortness of breath  CARDIOVASCULAR: No chest pain, palpitations, dizziness, or leg swelling  GASTROINTESTINAL: No abdominal or epigastric pain.       MEDICATIONS  (STANDING):  chlorhexidine 2% Cloths 1 Application(s) Topical <User Schedule>  clotrimazole 1% Cream 1 Application(s) Topical two times a day  finasteride 5 milliGRAM(s) Oral daily  furosemide   Injectable 60 milliGRAM(s) IV Push three times a day  midodrine. 10 milliGRAM(s) Oral three times a day  pantoprazole    Tablet 40 milliGRAM(s) Oral before breakfast    MEDICATIONS  (PRN):  acetaminophen     Tablet .. 650 milliGRAM(s) Oral every 8 hours PRN Temp greater or equal to 38C (100.4F), Mild Pain (1 - 3)  glycopyrrolate Injectable 0.4 milliGRAM(s) IV Push every 6 hours PRN Excessive secretions  HYDROmorphone  Injectable 0.2 milliGRAM(s) IV Push every 2 hours PRN Dyspnea, RR>24  HYDROmorphone  Injectable 0.2 milliGRAM(s) IV Push every 2 hours PRN Severe Pain (7 - 10)  lidocaine   4% Patch 1 Patch Transdermal every 24 hours PRN lower back pain  LORazepam   Injectable 0.2 milliGRAM(s) IV Push every 2 hours PRN Agitation, refractory dyspnea  melatonin 3 milliGRAM(s) Oral at bedtime PRN Insomnia  ondansetron Injectable 4 milliGRAM(s) IV Push every 8 hours PRN Nausea and/or Vomiting  senna 2 Tablet(s) Oral at bedtime PRN Constipation        CAPILLARY BLOOD GLUCOSE        I&O's Summary    30 Jun 2022 07:01  -  01 Jul 2022 07:00  --------------------------------------------------------  IN: 240 mL / OUT: 0 mL / NET: 240 mL    01 Jul 2022 07:01  -  01 Jul 2022 21:55  --------------------------------------------------------  IN: 240 mL / OUT: 0 mL / NET: 240 mL        T(C): 36.4 (07-01-22 @ 20:45), Max: 36.4 (07-01-22 @ 04:50)  HR: 89 (07-01-22 @ 20:45) (87 - 100)  BP: 108/69 (07-01-22 @ 20:45) (91/61 - 112/71)  RR: 20 (07-01-22 @ 20:45) (17 - 20)  SpO2: 93% (07-01-22 @ 20:45) (91% - 93%)    PHYSICAL EXAM:    NECK: Supple, No JVD  CHEST/LUNG: Clear to auscultation bilaterally; No wheezing.  HEART: Regular rate and rhythm; No murmurs, rubs, or gallops  ABDOMEN: Soft, Nontender, Nondistended; Bowel sounds present  EXTREMITIES:   No edema  NEUROLOGY: AAO       LABS:                        9.7    15.91 )-----------( 204      ( 01 Jul 2022 07:03 )             29.6     07-01    131<L>  |  88<L>  |  126<H>  ----------------------------<  93  5.3   |  19<L>  |  3.65<H>    Ca    9.9      01 Jul 2022 07:02    TPro  6.7  /  Alb  4.7  /  TBili  12.4<H>  /  DBili  x   /  AST  98<H>  /  ALT  58<H>  /  AlkPhos  340<H>  07-01    PT/INR - ( 01 Jul 2022 07:03 )   PT: 46.2 sec;   INR: 3.96 ratio                 CAPILLARY BLOOD GLUCOSE            RADIOLOGY & ADDITIONAL TESTS:    Imaging Personally Reviewed:    Consultant(s) Notes Reviewed:      Care Discussed with Consultants/Other Providers:    Matheus Clarke MD, CMD, FACP    [id number] [id number]  Office Tel: [phone]  Cell: [phone]

## 2022-07-01 NOTE — CONSULT NOTE ADULT - PROVIDER SPECIALTY LIST ADULT
Heme/Onc
Intervent Radiology
Hepatology
Hepatology
Intervent Radiology
Nephrology
Intervent Radiology
Palliative Care

## 2022-07-01 NOTE — CONSULT NOTE ADULT - SUBJECTIVE AND OBJECTIVE BOX
HPI:  Patient is a 76 year old male w/pmh significant for Afib on Eliquis , HTN ,  HLD , presenting for worsening shortness of breath over the past several weeks.   Patient reports previous unlimited exercise tolerance , however, over the past several weeks  he became increasingly dyspneic with exertion , and now  short of breath with minimal activity. Patient reports no chest pain , no palpitations ,  he has some lower extremity edema , no orthopnea and no PND. He reports no cough or fever. Patient was evaluated by his PMD and bloodwork  showed elevated liver tests and decreased renal function. Patient also reports being evaluated by cardiology and echocardiogram reportedly showed no major abnormalities.  Repeat lab  testing showed worsening of renal function and patient was referred to the hospital for further evaluation. Patient reports no dysuria. He reports no alcohol use.      (14 Jun 2022 03:05)    Additional HPI:  Patient has been functionally declining over the past few weeks per patient's wife. They felt something was wrong and then eventually ended up in the hospital 2 weeks ago. Since admission, patient was found to have nodular liver and biopsy confirmed metastatic adenocarcinoma. Pt continued to functionally decline during admission and had worsening renal function. Following cancer diagnosis, family opted to forgo treatment which would have been palliative in nature.    PERTINENT PM/SXH:   Hyperlipidemia    Atrial fibrillation    Hypertension      S/P hernia repair      FAMILY HISTORY:  FH: emphysema (Mother)      Family Hx substance abuse [ ]yes [ ]no  ITEMS NOT CHECKED ARE NOT PRESENT    SOCIAL HISTORY:   Significant other/partner[X] Michelle  Children[X] 2 daughters, Coretta   Episcopalian/Spirituality:  Substance hx:  [ ]   Tobacco hx:  [ ]   Alcohol hx: [ ]   Home Opioid hx:  [ ] I-Stop Reference No:  Living Situation: [X]Home  [ ]Long term care  [ ]Rehab [ ]Other    ADVANCE DIRECTIVES:    DNR/MOLST  [ ]  Living Will  [ ]   DECISION MAKER(s):  [ ] Health Care Proxy(s)  [ ] Surrogate(s)  [ ] Guardian           Name(s): Phone Number(s):    BASELINE (I)ADL(s) (prior to admission):  [place]: [ ]Total  [X] Moderate [ ]Dependent    Allergies    No Known Allergies    Intolerances    MEDICATIONS  (STANDING):  apixaban 2.5 milliGRAM(s) Oral two times a day  chlorhexidine 2% Cloths 1 Application(s) Topical <User Schedule>  clotrimazole 1% Cream 1 Application(s) Topical two times a day  finasteride 5 milliGRAM(s) Oral daily  furosemide   Injectable 60 milliGRAM(s) IV Push three times a day  midodrine. 10 milliGRAM(s) Oral three times a day  octreotide  Infusion 50 MICROgram(s)/Hr (10 mL/Hr) IV Continuous <Continuous>  pantoprazole    Tablet 40 milliGRAM(s) Oral before breakfast  sodium bicarbonate 650 milliGRAM(s) Oral three times a day    MEDICATIONS  (PRN):  acetaminophen     Tablet .. 650 milliGRAM(s) Oral every 8 hours PRN Temp greater or equal to 38C (100.4F), Mild Pain (1 - 3)  HYDROmorphone  Injectable 0.2 milliGRAM(s) IV Push every 2 hours PRN Dyspnea, RR>24  lidocaine   4% Patch 1 Patch Transdermal every 24 hours PRN lower back pain  melatonin 3 milliGRAM(s) Oral at bedtime PRN Insomnia  ondansetron Injectable 4 milliGRAM(s) IV Push every 8 hours PRN Nausea and/or Vomiting  senna 2 Tablet(s) Oral at bedtime PRN Constipation    PRESENT SYMPTOMS: [ ]Unable to self-report  [ ] CPOT [ ] PAINADs [ ] RDOS  Source if other than patient:  [ ]Family   [ ]Team     Pain: [ ]yes [X]no  QOL impact -   Location -                    Aggravating factors -  Quality -  Radiation -  Timing-  Severity (0-10 scale):  Minimal acceptable level (0-10 scale):     CPOT:    https://www.[facility].org/getattachment/ohd63r83-5a2q-[email or web address]-8262o6792w3d/Critical-Care-Pain-Observation-Tool-(CPOT)    PAIN AD Score:   http://geriatrictoolkit.[place].[facility]/cog/painad.pdf (press ctrl +  left click to view)    Dyspnea:                           [ ]Mild [ ]Moderate [X]Severe    RDOS:  https://homecareinformation.net/handouts/hen/Respiratory_Distress_Observation_Scale.pdf (Ctrl +  left click to view)     Anxiety:                             [ ]Mild [ ]Moderate [ ]Severe  Fatigue:                             [ ]Mild [ ]Moderate [ ]Severe  Nausea:                             [ ]Mild [ ]Moderate [ ]Severe  Loss of appetite:              [ ]Mild [ ]Moderate [ ]Severe  Constipation:                    [ ]Mild [ ]Moderate [ ]Severe    Other Symptoms:  [X]All other review of systems negative     Palliative Performance Status Version 2:         30%    http://[facility].org/files/news/palliative_performance_scale_ppsv2.pdf    PHYSICAL EXAM:  Vital Signs Last 24 Hrs  T(C): 36.4 (01 Jul 2022 04:50), Max: 36.4 (01 Jul 2022 04:50)  T(F): 97.5 (01 Jul 2022 04:50), Max: 97.5 (01 Jul 2022 04:50)  HR: 100 (01 Jul 2022 11:30) (87 - 100)  BP: 91/61 (01 Jul 2022 11:58) (91/61 - 112/72)  BP(mean): --  RR: 17 (01 Jul 2022 04:50) (17 - 17)  SpO2: 92% (01 Jul 2022 04:50) (92% - 94%) I&O's Summary    30 Jun 2022 07:01  -  01 Jul 2022 07:00  --------------------------------------------------------  IN: 240 mL / OUT: 0 mL / NET: 240 mL    01 Jul 2022 07:01  -  01 Jul 2022 14:30  --------------------------------------------------------  IN: 240 mL / OUT: 0 mL / NET: 240 mL      GENERAL: [ ]Cachexia  [X] Jaundiced  [X]Alert  [ ]Oriented x   [ ]Lethargic  [ ]Unarousable  [X]Verbal  [ ]Non-Verbal  Behavioral:   [ ] Anxiety  [ ] Delirium [ ] Agitation [ ] Other  HEENT:  [X]Normal   [ ]Dry mouth   [ ]ET Tube/Trach  [ ]Oral lesions  PULMONARY:   [X]Clear [X]Tachypnea  [ ]Audible excessive secretions   [ ]Rhonchi        [ ]Right [ ]Left [ ]Bilateral  [ ]Crackles        [ ]Right [ ]Left [ ]Bilateral  [ ]Wheezing     [ ]Right [ ]Left [ ]Bilateral  [ ]Diminished breath sounds [ ]right [ ]left [ ]bilateral  CARDIOVASCULAR:    [X]Regular [ ]Irregular [ ]Tachy  [ ]Mukul [ ]Murmur [ ]Other  GASTROINTESTINAL:  [X]Soft  [ ]Distended   [ ]+BS  [X]Non tender [ ]Tender  [ ]Other [ ]PEG [ ]OGT/ NGT  Last BM:  GENITOURINARY:  [X]Normal [ ] Incontinent   [ ]Oliguria/Anuria   [ ]Ibarra  MUSCULOSKELETAL:   [ ]Normal   [X]Weakness  [ ]Bed/Wheelchair bound [ ]Edema  NEUROLOGIC:   [X]No focal deficits  [ ]Cognitive impairment  [ ]Dysphagia [ ]Dysarthria [ ]Paresis [ ]Other   SKIN:   [X]Normal  [ ]Rash  [ ]Other  [ ]Pressure ulcer(s)       Present on admission [ ]y [ ]n    CRITICAL CARE:  [ ] Shock Present  [ ]Septic [ ]Cardiogenic [ ]Neurologic [ ]Hypovolemic  [ ]  Vasopressors [ ]  Inotropes   [ ]Respiratory failure present [ ]Mechanical ventilation [ ]Non-invasive ventilatory support [ ]High flow    [ ]Acute  [ ]Chronic [ ]Hypoxic  [ ]Hypercarbic [ ]Other  [ ]Other organ failure     LABS:                        9.7    15.91 )-----------( 204      ( 01 Jul 2022 07:03 )             29.6   07-01    131<L>  |  88<L>  |  126<H>  ----------------------------<  93  5.3   |  19<L>  |  3.65<H>    Ca    9.9      01 Jul 2022 07:02    TPro  6.7  /  Alb  4.7  /  TBili  12.4<H>  /  DBili  x   /  AST  98<H>  /  ALT  58<H>  /  AlkPhos  340<H>  07-01  PT/INR - ( 01 Jul 2022 07:03 )   PT: 46.2 sec;   INR: 3.96 ratio           RADIOLOGY & ADDITIONAL STUDIES:    PROTEIN CALORIE MALNUTRITION PRESENT: [ ]mild [ ]moderate [ ]severe [ ]underweight [ ]morbid obesity  https://www.andeal.org/vault/9054/web/files/ONC/Table_Clinical%20Characteristics%20to%20Document%20Malnutrition-White%20JV%20et%20al%202012.pdf    Height (cm): 175.3 (06-21-22 @ 13:36)  Weight (kg): 90.3 (06-21-22 @ 13:36)  BMI (kg/m2): 29.4 (06-21-22 @ 13:36)    [X]PPSV2 < or = to 30% [ ]significant weight loss  [X]poor nutritional intake  [ ]anasarca[ ]Artificial Nutrition      REFERRALS:   [ ]Chaplaincy  [ ]Hospice  [ ]Child Life  [ ]Social Work  [X]Case management [ ]Holistic Therapy     Goals of Care Document: VANNA Ng (06-22-22 @ 16:13)  Goals of Care Conversation:   Participants:  · Participants  Patient    Advance Directives:  · Does patient have Advance Directive  Yes  · Indicate Type  Health Care Proxy (HCP)  · Agent's Name  Michelle Yfn Palacios dtr  · Phone #  [phone]  · If HCP is not on chart, identify the persons name and phone number contacted to bring in document  Asked pt ot have a copy of form provided for medical records  · Are any of the items on the chart  No  · Does Patient Have a Surrogate  Yes  · Surrogate's Name  Michelle Otooleevan Palacios dtr  · Surrogate's Phone Number  [phone]  · Does the Patient have a Court Appointed Guardian ([id number])  No  · Caregiver:  yes  · Name  Michelle Yfn Palacios dtr  · Phone Number  [phone]    Conversation Discussion:  · Conversation  Chaplaincy Referral; Palliative Care Referral; HCP code status tube feeding dialysis  · Conversation Details  Introduced self and role of the PCE.  Patient was seen for goals of care conversation and watched what is Palliative Care.  No documents found on patient window search.  Pt reports his spouse Michelle is his HCP and his daughter Coretta would be his alternate.  Pt would like to return home after hospital stay.  Pt is currently waiting on test results to determine his next course of treatment.  He is aware that he would not want to start dialysis if it is necessary.  He is aware he would never want to be on tube feedings and wants his family to bring him a BBQ meal including cheeseburgers.  He does believe he can have these meals once in a while regardless of medical condition.   He currently is taking nutritional supplements and when talking about temporary tube feedings and supplemental feedings he would not want to attempt tube feeding. Pt is comfortable with hydration via IV and antibiotics.     CPR/ intubation procedures and possible complications explained.  Pt wants to remain full code with CPR and intubation.  He will await test results if he changes him idea of survival.  He is interested in Palliative care and will discuss with his spouse and children.      Pt instructed to provide a copy of HCP.  Contact information for further needs provided.  No [employer]ish exemptions noted and would like Chaplaincy care and prayer when in hospital setting.  Has been seen already. Code NAYR provide.  Enjoys donating to [facility].      Susan Ng RN  Palliative Care Educator  [phone] cell    What Matters Most To Patient and Family:  · What matters most to patient and family  remain full code and will determine limitations after test results.  does not want dialysis or tube feedings    Personal Advance Directives Treatment Guidelines:   Treatment Guidelines:  · Decision Maker  Patient  · Treatment Guidelines  No artificial nutrition  · Treatment Guideline Comments  CPR Intubation  no dialysis    Location of Discussion:   Time Spent on Advance Care Planning:  I spent 45 (in minutes) on advance care planning services with the patient.  This time is separate and distinct from any other care management services provided on this date.    Location of Discussion:  · Location of discussion  Face to face      Electronic Signatures:  Susan Ng (RN)  (Signed 22-Jun-2022 16:27)  	Authored: Goals of Care Conversation, Personal Advance Directives Treatment Guidelines, Location of Discussion      Last Updated: 22-Jun-2022 16:27 by Susan Ng (RN)     HPI:  Patient is a 76 year old male w/pmh significant for Afib on Eliquis , HTN ,  HLD , presenting for worsening shortness of breath over the past several weeks.   Patient reports previous unlimited exercise tolerance , however, over the past several weeks  he became increasingly dyspneic with exertion , and now  short of breath with minimal activity. Patient reports no chest pain , no palpitations ,  he has some lower extremity edema , no orthopnea and no PND. He reports no cough or fever. Patient was evaluated by his PMD and bloodwork  showed elevated liver tests and decreased renal function. Patient also reports being evaluated by cardiology and echocardiogram reportedly showed no major abnormalities.  Repeat lab  testing showed worsening of renal function and patient was referred to the hospital for further evaluation. Patient reports no dysuria. He reports no alcohol use.      (14 Jun 2022 03:05)    Additional HPI:  Patient has been functionally declining over the past few weeks per patient's wife. They felt something was wrong and then eventually ended up in the hospital 2 weeks ago. Since admission, patient was found to have nodular liver and biopsy confirmed metastatic adenocarcinoma. Pt continued to functionally decline during admission and had worsening renal function. Following cancer diagnosis, family opted to forgo treatment which would have been palliative in nature.    PERTINENT PM/SXH:   Hyperlipidemia    Atrial fibrillation    Hypertension      S/P hernia repair      FAMILY HISTORY:  FH: emphysema (Mother)      Family Hx substance abuse [ ]yes [ ]no  ITEMS NOT CHECKED ARE NOT PRESENT    SOCIAL HISTORY:   Significant other/partner[X] Michelle  Children[X] 2 daughters, Coretta   Latter day/Spirituality:  Substance hx:  [ ]   Tobacco hx:  [ ]   Alcohol hx: [ ]   Home Opioid hx:  [ ] I-Stop Reference No:  Living Situation: [X]Home  [ ]Long term care  [ ]Rehab [ ]Other    ADVANCE DIRECTIVES:    DNR/MOLST  [ ]  Living Will  [ ]   DECISION MAKER(s):  [ ] Health Care Proxy(s)  [ ] Surrogate(s)  [ ] Guardian           Name(s): Phone Number(s):    BASELINE (I)ADL(s) (prior to admission):  [place]: [ ]Total  [X] Moderate [ ]Dependent    Allergies    No Known Allergies    Intolerances    MEDICATIONS  (STANDING):  apixaban 2.5 milliGRAM(s) Oral two times a day  chlorhexidine 2% Cloths 1 Application(s) Topical <User Schedule>  clotrimazole 1% Cream 1 Application(s) Topical two times a day  finasteride 5 milliGRAM(s) Oral daily  furosemide   Injectable 60 milliGRAM(s) IV Push three times a day  midodrine. 10 milliGRAM(s) Oral three times a day  octreotide  Infusion 50 MICROgram(s)/Hr (10 mL/Hr) IV Continuous <Continuous>  pantoprazole    Tablet 40 milliGRAM(s) Oral before breakfast  sodium bicarbonate 650 milliGRAM(s) Oral three times a day    MEDICATIONS  (PRN):  acetaminophen     Tablet .. 650 milliGRAM(s) Oral every 8 hours PRN Temp greater or equal to 38C (100.4F), Mild Pain (1 - 3)  HYDROmorphone  Injectable 0.2 milliGRAM(s) IV Push every 2 hours PRN Dyspnea, RR>24  lidocaine   4% Patch 1 Patch Transdermal every 24 hours PRN lower back pain  melatonin 3 milliGRAM(s) Oral at bedtime PRN Insomnia  ondansetron Injectable 4 milliGRAM(s) IV Push every 8 hours PRN Nausea and/or Vomiting  senna 2 Tablet(s) Oral at bedtime PRN Constipation    PRESENT SYMPTOMS: [ ]Unable to self-report  [ ] CPOT [ ] PAINADs [ ] RDOS  Source if other than patient:  [ ]Family   [ ]Team     Pain: [ ]yes [X]no  QOL impact -   Location -                    Aggravating factors -  Quality -  Radiation -  Timing-  Severity (0-10 scale):  Minimal acceptable level (0-10 scale):     CPOT:    https://www.[facility].org/getattachment/[email or web address]/Critical-Care-Pain-Observation-Tool-(CPOT)    PAIN AD Score:   http://geriatrictoolkit.[place].[facility]/cog/painad.pdf (press ctrl +  left click to view)    Dyspnea:                           [ ]Mild [ ]Moderate [X]Severe    RDOS:  https://homecareinformation.net/handouts/hen/Respiratory_Distress_Observation_Scale.pdf (Ctrl +  left click to view)     Anxiety:                             [ ]Mild [ ]Moderate [ ]Severe  Fatigue:                             [ ]Mild [ ]Moderate [ ]Severe  Nausea:                             [ ]Mild [ ]Moderate [ ]Severe  Loss of appetite:              [ ]Mild [ ]Moderate [ ]Severe  Constipation:                    [ ]Mild [ ]Moderate [ ]Severe    Other Symptoms:  [X]All other review of systems negative     Palliative Performance Status Version 2:         30%    http://[facility].org/files/news/palliative_performance_scale_ppsv2.pdf    PHYSICAL EXAM:  Vital Signs Last 24 Hrs  T(C): 36.4 (01 Jul 2022 04:50), Max: 36.4 (01 Jul 2022 04:50)  T(F): 97.5 (01 Jul 2022 04:50), Max: 97.5 (01 Jul 2022 04:50)  HR: 100 (01 Jul 2022 11:30) (87 - 100)  BP: 91/61 (01 Jul 2022 11:58) (91/61 - 112/72)  BP(mean): --  RR: 17 (01 Jul 2022 04:50) (17 - 17)  SpO2: 92% (01 Jul 2022 04:50) (92% - 94%) I&O's Summary    30 Jun 2022 07:01  -  01 Jul 2022 07:00  --------------------------------------------------------  IN: 240 mL / OUT: 0 mL / NET: 240 mL    01 Jul 2022 07:01  -  01 Jul 2022 14:30  --------------------------------------------------------  IN: 240 mL / OUT: 0 mL / NET: 240 mL      GENERAL: [ ]Cachexia  [X] Jaundiced  [X]Alert  [ ]Oriented x   [ ]Lethargic  [ ]Unarousable  [X]Verbal  [ ]Non-Verbal  Behavioral:   [ ] Anxiety  [ ] Delirium [ ] Agitation [ ] Other  HEENT:  [X]Normal   [ ]Dry mouth   [ ]ET Tube/Trach  [ ]Oral lesions  PULMONARY:   [X]Clear [X]Tachypnea  [ ]Audible excessive secretions   [ ]Rhonchi        [ ]Right [ ]Left [ ]Bilateral  [ ]Crackles        [ ]Right [ ]Left [ ]Bilateral  [ ]Wheezing     [ ]Right [ ]Left [ ]Bilateral  [ ]Diminished breath sounds [ ]right [ ]left [ ]bilateral  CARDIOVASCULAR:    [X]Regular [ ]Irregular [ ]Tachy  [ ]Mukul [ ]Murmur [ ]Other  GASTROINTESTINAL:  [X]Soft  [ ]Distended   [ ]+BS  [X]Non tender [ ]Tender  [ ]Other [ ]PEG [ ]OGT/ NGT  Last BM:  GENITOURINARY:  [X]Normal [ ] Incontinent   [ ]Oliguria/Anuria   [ ]Ibarra  MUSCULOSKELETAL:   [ ]Normal   [X]Weakness  [ ]Bed/Wheelchair bound [ ]Edema  NEUROLOGIC:   [X]No focal deficits  [ ]Cognitive impairment  [ ]Dysphagia [ ]Dysarthria [ ]Paresis [ ]Other   SKIN:   [X]Normal  [ ]Rash  [ ]Other  [ ]Pressure ulcer(s)       Present on admission [ ]y [ ]n    CRITICAL CARE:  [ ] Shock Present  [ ]Septic [ ]Cardiogenic [ ]Neurologic [ ]Hypovolemic  [ ]  Vasopressors [ ]  Inotropes   [ ]Respiratory failure present [ ]Mechanical ventilation [ ]Non-invasive ventilatory support [ ]High flow    [ ]Acute  [ ]Chronic [ ]Hypoxic  [ ]Hypercarbic [ ]Other  [ ]Other organ failure     LABS:                        9.7    15.91 )-----------( 204      ( 01 Jul 2022 07:03 )             29.6   07-01    131<L>  |  88<L>  |  126<H>  ----------------------------<  93  5.3   |  19<L>  |  3.65<H>    Ca    9.9      01 Jul 2022 07:02    TPro  6.7  /  Alb  4.7  /  TBili  12.4<H>  /  DBili  x   /  AST  98<H>  /  ALT  58<H>  /  AlkPhos  340<H>  07-01  PT/INR - ( 01 Jul 2022 07:03 )   PT: 46.2 sec;   INR: 3.96 ratio           RADIOLOGY & ADDITIONAL STUDIES:    PROTEIN CALORIE MALNUTRITION PRESENT: [ ]mild [ ]moderate [ ]severe [ ]underweight [ ]morbid obesity  https://www.andeal.org/vault/2670/web/files/ONC/Table_Clinical%20Characteristics%20to%20Document%20Malnutrition-White%20JV%20et%20al%270279.pdf    Height (cm): 175.3 (06-21-22 @ 13:36)  Weight (kg): 90.3 (06-21-22 @ 13:36)  BMI (kg/m2): 29.4 (06-21-22 @ 13:36)    [X]PPSV2 < or = to 30% [ ]significant weight loss  [X]poor nutritional intake  [ ]anasarca[ ]Artificial Nutrition      REFERRALS:   [ ]Chaplaincy  [ ]Hospice  [ ]Child Life  [ ]Social Work  [X]Case management [ ]Holistic Therapy

## 2022-07-01 NOTE — CONSULT NOTE ADULT - PROBLEM SELECTOR RECOMMENDATION 2
Newly diagnosed adenocarcinoma infiltrating b/l lobes of liver.  - Pt opting against initiating treatment. Opting instead for symptom focused care.

## 2022-07-01 NOTE — CONSULT NOTE ADULT - PROBLEM SELECTOR RECOMMENDATION 5
Seen for GOC and PCU evaluation. PCU transfer pending bed availability.    Please page Geriatrics and Palliative Service for any uncontrolled symptoms at [phone].    Carli Bazan MD  Palliative Fellow, PGY5

## 2022-07-01 NOTE — CONSULT NOTE ADULT - CONSULT REASON
Cirrhosis
Adwoa on ? CKD, hyperkalemia
Elevated liver tests
diagnostic paracentesis
Liver biopsy and paracentesis
Infiltrative disease liver and abnormal coags
GOC, PCU eval

## 2022-07-01 NOTE — CONSULT NOTE ADULT - CONVERSATION DETAILS
Discussed patient's new cancer diagnosis as well as his worsening renal function. Patient and family were aware of his current medical conditions and the treatment options but felt that he would not benefit from treatment for his cancer. Mr. Coulter asked to be DNR and DNI because of his cancer and knowing that resuscitation and intubation would be futile given his declining condition and lack of cancer treatment. He also endorsed not wanting any hemodialysis. We discussed shifting focus of our care towards symptom management and improving his quality of life which he was agreeable to. This included no further blood draws and treating symptoms of pain and dyspnea with opioid medications. Lastly, we discussed possible transfer to PCU which he and family were in agreement with.

## 2022-07-01 NOTE — CONSULT NOTE ADULT - CONSULT REQUESTED DATE/TIME
14-Jun-2022 12:21
14-Jun-2022 13:31
16-Jun-2022 08:08
14-Jun-2022 14:24
15-Ernesto-2022 10:27
19-Jun-2022 14:34
01-Jul-2022 14:29

## 2022-07-01 NOTE — CONSULT NOTE ADULT - ATTENDING COMMENTS
This is a 76-year-old male with past medical history of atrial fibrillation on Eliquis, hypertension, hyperlipidemia currently admitted with dyspnea on exertion and was noted to have elevated liver test and finding suggestive of cirrhosis of the liver on imaging.  Etiology unclear but high suspicion for underlying nonalcoholic steatohepatitis related cirrhosis.  Patient does recall being diagnosed with fatty liver for a long time.  However in view of the symptoms of dyspnea on exertion we will need to rule out congestive heart failure.
Please see my impression and recommendations under GI Fellow's consult note from the same date.
76M with Afib (on Eliquis), HTN, HLD p/w shortness of breath found to have ROBERT. Workup found metastatic adenocarcinoma infiltrating his liver and hospital course was c/b worsening renal function. Oncology discussed treatment options but given patient's functional decline during hospital stay and the palliative nature of treatment options, patient opted against cancer directed therapies as well as HD. Geriatrics and Palliative team consulted for [facility], where pt/family decided to pursue comfort measures and PCU transfer    Pt seen this afternoon with wife, dtr and grandson at bedside. He had just received a dose of IV dilaudid for dyspnea and responded fairly well. He remains alert with much improved respiratory status. Overview of PCU provided to the pt/family and everyone is on board with this plan of care. Dtr inquired about prognosis and was informed of days. There are other family members coming in from out of state to for an end of life visit with pt. Support provided to the family in my visit today. Comfort directed regimen as above. Tx to PCU pending bed availability.

## 2022-07-01 NOTE — CONSULT NOTE ADULT - PROBLEM SELECTOR RECOMMENDATION 9
Pt with worsening dyspnea in setting of metastatic adenocarcinoma.  - Dilaudid 0.2mg IV q2h PRN dyspnea  - Ativan 0.2mg IV q2h PRN refractory dyspnea

## 2022-07-01 NOTE — PROGRESS NOTE ADULT - ASSESSMENT
76 year old male w/pm[facility] significant for Afib on Eliquis , HTN ,  HLD , presenting for worsening shortness of breath found to have ROBERT on ? CKD and hyperkalemia with metabolic acidosis    1) ROBERT on ?CKD  Cr was rising  again--> ddx includes pre renal vs atn vs r/o HRS-->  Creatinine Trend:3.65 <--, 3.08 <--, 2.77 <--, 2.80 <--, 2.87 <--, 2.71 <--, 2.37 <--, 2.13 <--  pt not aware of underlying kidney disease--> may have component of Hypertensive Nephropathy  Renal US --> right kidney 9.8cm and left kidney 9.9cm--> no hydro  Cr rising and Lfts rising --> overall pt is declining and given limited treatment options--> Palliative and Hospice appears appropriate-> pt and family request DNR/DNI  After discussing with daughter, wife and Patient--> everyone agrees that dialysis will NOT change overall outcome and wish NOT to pursue understanding risk of death  AT this point symptomatic treatment appears appropriate  will d/c albumin  start lasix 60mg iv TID  would d/c all blood draws  c/w midodrine 10mg po q8h  c/w octreotide gtt  keep off ARB and HCTZ  avoid nephrotoxins  trend bmp q daily    2) Hyperkalemia-   high k--> possibly 2/2 dec forward flow  on [facility]  d/c albumin  start lasix as above  low K in  diet added  trend k q daily    3) Metabolic acidosis-   insetting of robert/ckd  c/w nabicarb 650mg po tid  trend bicarb    Dr Romero  [phone]

## 2022-07-01 NOTE — CONSULT NOTE ADULT - ASSESSMENT
75 yo M with Afib (on Eliquis), HTN,  HLD p/w shortness of breath found to have ROBERT. Workup found metastatic adenocarcinoma infiltrating his liver and hospital course was c/b worsening renal function. Oncology discussed treatment options but given patient's functional decline during hospital stay and the palliative nature of treatment options, patient opted against treatment. He also opted against HD. Geriatrics and Palliative team consulted for GOC.

## 2022-07-01 NOTE — CONSULT NOTE ADULT - PROBLEM SELECTOR RECOMMENDATION 3
Pt with functional decline over past few weeks and accelerated decline over past few days. PPSV 30%  - Supportive care

## 2022-07-01 NOTE — PROGRESS NOTE ADULT - NUTRITIONAL ASSESSMENT
This patient has been assessed with a concern for Malnutrition and has been determined to have a diagnosis/diagnoses of Severe protein-calorie malnutrition.    This patient is being managed with:   Diet Regular-  No Concentrated Potassium  Supplement Feeding Modality:  Oral  Nepro Cans or Servings Per Day:  1       Frequency:  Daily  Entered: Jun 28 2022  4:58PM    

## 2022-07-01 NOTE — CONSULT NOTE ADULT - REASON FOR ADMISSION
ROBERT and elevated liver tests

## 2022-07-01 NOTE — PROGRESS NOTE ADULT - SUBJECTIVE AND OBJECTIVE BOX
Patient seen and examined  has some sob  abd distention  wife by bedside    No Known Allergies    Hospital Medications:   MEDICATIONS  (STANDING):  albumin human 25% IVPB 100 milliLiter(s) IV Intermittent every 6 hours  apixaban 2.5 milliGRAM(s) Oral two times a day  chlorhexidine 2% Cloths 1 Application(s) Topical <User Schedule>  clotrimazole 1% Cream 1 Application(s) Topical two times a day  finasteride 5 milliGRAM(s) Oral daily  midodrine. 10 milliGRAM(s) Oral three times a day  octreotide  Infusion 50 MICROgram(s)/Hr (10 mL/Hr) IV Continuous <Continuous>  pantoprazole    Tablet 40 milliGRAM(s) Oral before breakfast  sodium bicarbonate 650 milliGRAM(s) Oral three times a day        VITALS:  T(F): 97.5 (22 @ 04:50), Max: 97.5 (22 @ 04:50)  HR: 100 (22 @ 11:30)  BP: 91/61 (22 @ 11:58)  RR: 17 (22 @ 04:50)  SpO2: 92% (22 @ 04:50)  Wt(kg): --     @ :  -   @ 07:00  --------------------------------------------------------  IN: 240 mL / OUT: 0 mL / NET: 240 mL     @ :  -   @ 13:00  --------------------------------------------------------  IN: 240 mL / OUT: 0 mL / NET: 240 mL        PHYSICAL EXAM:  Constitutional: ill appearing  HEENT: ++icteric sclera,  Respiratory: b/l rhonchi  Cardiovascular: S1, S2, RRR  Gastrointestinal: BS+, soft, distended  ascites+  Extremities: ++ peripheral edema  Neurological: A/O x 3, no focal deficits      LABS:      131<L>  |  88<L>  |  126<H>  ----------------------------<  93  5.3   |  19<L>  |  3.65<H>    Ca    9.9      2022 07:02    TPro  6.7  /  Alb  4.7  /  TBili  12.4<H>  /  DBili      /  AST  98<H>  /  ALT  58<H>  /  AlkPhos  340<H>      Creatinine Trend: 3.65 <--, 3.08 <--, 2.77 <--, 2.80 <--, 2.87 <--, 2.71 <--, 2.37 <--, 2.13 <--                        9.7    15.91 )-----------( 204      ( 2022 07:03 )             29.6     Urine Studies:  Urinalysis Basic - ( 2022 15:24 )    Color: Yellow / Appearance: Slightly Turbid / S.015 / pH:   Gluc:  / Ketone: Negative  / Bili: Small / Urobili: 2 mg/dL   Blood:  / Protein: Trace / Nitrite: Negative   Leuk Esterase: Negative / RBC: 2 /hpf / WBC 0 /HPF   Sq Epi:  / Non Sq Epi: 0 /hpf / Bacteria: Few      Potassium, Random Urine: 37 mmol/L ( @ 15:26)  Osmolality, Random Urine: 397 mos/kg ( @ 15:26)  Sodium, Random Urine: 9 mmol/L ( @ 15:26)  Creatinine, Random Urine: 72 mg/dL ( @ 15:26)  Chloride, Random Urine: <20 mmol/L ( @ 15:26)  Potassium, Random Urine: 43 mmol/L ( @ 23:29)  Osmolality, Random Urine: 385 mos/kg ( @ 23:29)  Sodium, Random Urine: 11 mmol/L ( @ 23:29)  Creatinine, Random Urine: 116 mg/dL ( @ 23:29)  Chloride, Random Urine: <20 mmol/L ( @ 23:29)    RADIOLOGY & ADDITIONAL STUDIES:

## 2022-07-02 NOTE — PROGRESS NOTE ADULT - SUBJECTIVE AND OBJECTIVE BOX
GAP TEAM PALLIATIVE CARE UNIT PROGRESS NOTE:      [  ] Patient on hospice program.    INDICATION FOR PALLIATIVE CARE UNIT SERVICES/Interval HPI: Symptom-directed care    OVERNIGHT EVENTS: Used x2 PRNs Dilaudid 0.2mg IV for dyspnea and x1 PRN Dilaudid 0.2mg IV for severe pain in 24 hours (8am-8am).    DNR on chart: Yes  Yes      Allergies    No Known Allergies    Intolerances    MEDICATIONS  (STANDING):  chlorhexidine 2% Cloths 1 Application(s) Topical <User Schedule>  clotrimazole 1% Cream 1 Application(s) Topical two times a day    MEDICATIONS  (PRN):  acetaminophen  Suppository .. 650 milliGRAM(s) Rectal every 6 hours PRN Temp greater or equal to 38C (100.4F)  glycopyrrolate Injectable 0.4 milliGRAM(s) IV Push every 6 hours PRN Excessive secretions  HYDROmorphone  Injectable 0.2 milliGRAM(s) IV Push every 1 hour PRN Dyspnea, RR>24  HYDROmorphone  Injectable 0.2 milliGRAM(s) IV Push every 1 hour PRN Moderate Pain (4 - 6)  HYDROmorphone  Injectable 0.5 milliGRAM(s) IV Push every 1 hour PRN Severe Pain (7 - 10)  LORazepam   Injectable 0.2 milliGRAM(s) IV Push every 1 hour PRN Agitation, refractory dyspnea  ondansetron Injectable 4 milliGRAM(s) IV Push every 8 hours PRN Nausea and/or Vomiting    ITEMS UNCHECKED ARE NOT PRESENT    PRESENT SYMPTOMS: [ ]Unable to self-report  [ ]PAINADs [ ]RDOS  Source if other than patient:  [ ]Family   [ ]Team     Pain: [ ] yes [X] no  QOL impact -   Location -                    Aggravating factors -  Quality -  Radiation -  Timing-  Severity (0-10 scale):  Minimal acceptable level (0-10 scale):     PAINAD Score:  http://geriatrictoolkit.[employer].edu/cog/painad.pdf (Ctrl +  left click to view)    Dyspnea:                           [X]Mild [ ]Moderate [ ]Severe    RDOS:  0 to 2  minimal or no respiratory distress   3  mild distress  4 to 6 moderate distress  >7 severe distress  https://[email or web address]/handouts/hen/Respiratory_Distress_Observation_Scale.pdf (Ctrl +  left click to view)    Anxiety:                             [ ]Mild [ ]Moderate [ ]Severe  Fatigue:                             [ ]Mild [ ]Moderate [ ]Severe  Nausea:                             [ ]Mild [ ]Moderate [ ]Severe  Loss of appetite:              [ ]Mild [ ]Moderate [ ]Severe  Constipation:                    [ ]Mild [ ]Moderate [ ]Severe    Other Symptoms:  [X]All other review of systems negative    Palliative Performance Status Version 2:         20%  http://[email or web address].org/files/news/palliative_performance_scale_[email or web address].pdf    PHYSICAL EXAM:   Vital Signs Last 24 Hrs  T(C): 36.7 (02 Jul 2022 07:37), Max: 37 (01 Jul 2022 23:57)  T(F): 98 (02 Jul 2022 07:37), Max: 98.6 (01 Jul 2022 23:57)  HR: 98 (02 Jul 2022 07:37) (89 - 98)  BP: 102/66 (02 Jul 2022 07:37) (102/66 - 116/58)  BP(mean): --  RR: 20 (02 Jul 2022 07:37) (20 - 24)  SpO2: 93% (02 Jul 2022 07:37) (92% - 93%) I&O's Summary    01 Jul 2022 07:01  -  02 Jul 2022 07:00  --------------------------------------------------------  IN: 360 mL / OUT: 0 mL / NET: 360 mL      GENERAL: [ ] Cachexia  [X]Alert  [ ]Oriented x   [ ]Lethargic  [ ]Unarousable  [X]Verbal  [ ]Non-Verbal  Behavioral:   [ ] Anxiety  [ ] Delirium [ ] Agitation [ ] Other  HEENT:  [X]Normal   [ ]Dry mouth   [ ]ET Tube/Trach  [ ]Oral lesions  PULMONARY:   [X]Clear [ ]Tachypnea  [ ]Audible excessive secretions   [ ]Rhonchi        [ ]Right [ ]Left [ ]Bilateral  [ ]Crackles        [ ]Right [ ]Left [ ]Bilateral  [ ]Wheezing     [ ]Right [ ]Left [ ]Bilateral  [ ]Diminished BS [ ]Right [ ]Left [ ]Bilateral    CARDIOVASCULAR:    [X]Regular [ ]Irregular [ ]Tachy  [ ]Mukul [ ]Murmur [ ]Other  GASTROINTESTINAL:  [X]Soft  [ ]Distended   [ ]+BS  [X]Non tender [ ]Tender  [ ]Other [ ]PEG [ ]OGT/ NGT   Last BM:  7/2  GENITOURINARY:  [ ]Normal [ ] Incontinent   [ ]Oliguria/Anuria   [X]Ibarra  MUSCULOSKELETAL:   [ ]Normal   [X]Weakness  [ ]Bed/Wheelchair bound [ ]Edema  NEUROLOGIC:   [X]No focal deficits  [ ] Cognitive impairment  [ ] Dysphagia [ ]Dysarthria [ ] Paresis [ ]Other   SKIN:   [X]Normal  [ ]Rash  [ ]Other  [ ]Pressure ulcer(s)  [ ]y [ ]n  Present on admission      CRITICAL CARE:  [ ] Shock Present  [ ]Septic [ ]Cardiogenic [ ]Neurologic [ ]Hypovolemic  [ ]  Vasopressors [ ]  Inotropes   [ ] Respiratory failure present [ ] Mechanical Ventilation [ ] Non-invasive ventilatory support [ ] High-Flow  [ ] Acute  [ ] Chronic [ ] Hypoxic  [ ] Hypercarbic [ ] Other  [ ] Other organ failure     LABS:                        9.7    15.91 )-----------( 204      ( 01 Jul 2022 07:03 )             29.6   07-01    131<L>  |  88<L>  |  126<H>  ----------------------------<  93  5.3   |  19<L>  |  3.65<H>    Ca    9.9      01 Jul 2022 07:02    TPro  6.7  /  Alb  4.7  /  TBili  12.4<H>  /  DBili  x   /  AST  98<H>  /  ALT  58<H>  /  AlkPhos  340<H>  07-01  PT/INR - ( 01 Jul 2022 07:03 )   PT: 46.2 sec;   INR: 3.96 ratio         RADIOLOGY & ADDITIONAL STUDIES:    PROTEIN CALORIE MALNUTRITION: [ ] mild [ ] moderate [ ] severe  [ ] underweight [ ] morbid obesity    https://www.andeal.org/vault/1070/web/files/ONC/Table_Clinical%20Characteristics%20to%20Document%20Malnutrition-White%20JV%20et%20al%202012.pdf    Height (cm): 175.3 (06-21-22 @ 13:36)  Weight (kg): 90.3 (06-21-22 @ 13:36)  BMI (kg/m2): 29.4 (06-21-22 @ 13:36)    [X] PPSV2 < or = 30% [ ] significant weight loss [X] poor nutritional intake [ ] anasarca   Artificial Nutrition [ ]     REFERRALS:   [ ]Chaplaincy  [ ] Hospice  [ ]Child Life  [X]Social Work  [ ]Case management [ ]Holistic Therapy [ ] Physical Therapy [ ] Dietary

## 2022-07-02 NOTE — PROGRESS NOTE ADULT - ATTENDING COMMENTS
76M, overweight BMI 29, h/o obesity before 85 lbs weight loss, afib (on Eliquis), HTN, HLD admitted with worsening dyspnea. Hepatology consulted for cirrhosis and elevated liver tests.    # Elevated liver tests - due innumerable heterogeneous liver masses, confluent at dome; No biliary ductal dilatation     # ROBERT - on CKD, unclear baseline, urine lytes not consistent with HRS/pre-renal; stable  # Cirrhosis - MELD 31. Likely ARTHUR/remote ELDON given h/o obesity (large abdomen, large amount of loose skin, max weight was 200 lbs long-term) drank 10 drinks on 3-4 days per week until his 50s  Ascites - minimal  Varices - unclear  PSE - non on PE  # Afib - off apixaban here    - CA 19-9 elevated 1606 U/mL (<=35), AFP 6.2 (<=8.3)    — liver biopsy tomorrow
Noted worsening total bilirubin level likely related to extensive hepatic mets.  Await liver biopsy results.
recommend albumin challenge for kidney injury  check urine lytes, UA  pending biopsy of liver masses  elevated CA 19-9 notced
This is a 76-year-old male with past medical history of atrial fibrillation on Eliquis, hypertension, hyperlipidemia currently admitted with dyspnea on exertion and was noted to have elevated liver test and finding suggestive of cirrhosis of the liver on imaging.  MRI/MRCP without contrast revealed extensive bilobar infiltrative hepatic neoplasm as described above for which the differential diagnosis includes infiltrative hepatocellular neoplasm such as hepatocellular carcinoma as well as metastatic disease (unclear if patient has cirrhosis based on imaging).   Will need further evaluation with MRI with and without IV contrast.  Agree with IV Albumin 25% 25 g q 8hrs.
This is a 76-year-old male with past medical history of atrial fibrillation on Eliquis, hypertension, hyperlipidemia currently admitted with dyspnea on exertion and was noted to have elevated liver test and finding suggestive of cirrhosis of the liver on imaging.  MRI/MRCP without contrast revealed extensive bilobar infiltrative hepatic neoplasm as described above for which the differential diagnosis includes infiltrative hepatocellular neoplasm such as hepatocellular carcinoma as well as metastatic disease (unclear if patient has cirrhosis based on imaging).   Will need further evaluation with MRI with and without IV contrast.  Agree with IV Albumin 25% 25 g q 8hrs.   Patient awaiting tolerated biopsy of the liver lesion.
continue albumin challenge for kidney injury, appears to have plateau  pending biopsy of liver masses today  elevated CA 19-9 noted, suspect primary biliary in origin
Patient in PCU for symptom directed care secondary to metastatic adenocarcinoma and worsening renal function. Over last 24 hours, utilized x2 PRNs over IV dilaudid. Losing oral route today and decision made to d/c non essential oral meds. will alter frequency of PRN medication to q1h as needed. c/w bowel regimen while on opiates, last BM today, 7/2. continue care in PCU for now and monitor symptoms. May require d/c planning to inpatient hospice pending clinical course. plan discussed with IDT.

## 2022-07-03 NOTE — PROGRESS NOTE ADULT - ASSESSMENT
77 yo M with Afib (on Eliquis), HTN,  HLD p/w shortness of breath found to have ROBERT. Workup found metastatic adenocarcinoma infiltrating his liver and hospital course was c/b worsening renal function. Oncology discussed treatment options but given patient's functional decline during hospital stay and the palliative nature of treatment options, patient opted against treatment. He also opted against HD. Geriatrics and Palliative team consulted for GOC.

## 2022-07-03 NOTE — PROGRESS NOTE ADULT - PROBLEM SELECTOR PROBLEM 5
HTN (hypertension)
Encounter for palliative care
HTN (hypertension)
Encounter for palliative care
HTN (hypertension)

## 2022-07-03 NOTE — PROGRESS NOTE ADULT - PROBLEM SELECTOR PROBLEM 1
ROBERT (acute kidney injury)
Elevated liver function tests
ROBERT (acute kidney injury)
Dyspnea
Elevated liver function tests
ROBERT (acute kidney injury)
Dyspnea
Elevated liver function tests
ROBERT (acute kidney injury)

## 2022-07-03 NOTE — PROGRESS NOTE ADULT - REASON FOR ADMISSION
ROBERT and elevated liver tests

## 2022-07-03 NOTE — PROGRESS NOTE ADULT - SUBJECTIVE AND OBJECTIVE BOX
GAP TEAM PALLIATIVE CARE UNIT PROGRESS NOTE:      [  ] Patient on hospice program.    INDICATION FOR PALLIATIVE CARE UNIT SERVICES/Interval HPI: Patient in PCU for symptom directed care; metastatic cancer infiltrating liver and worsening renal function. Over last 24 hours, required x1 PRN of dilaudid for dyspnea, x1 PRN ativan for agitation and x1 PRN glycopyrrolate for secretions. More lethargic this AM. Vitals stable apart from hypoxia to 88% on room air.     OVERNIGHT EVENTS:    DNR on chart: Yes  Yes      Allergies    No Known Allergies    Intolerances    MEDICATIONS  (STANDING):  chlorhexidine 2% Cloths 1 Application(s) Topical <User Schedule>  clotrimazole 1% Cream 1 Application(s) Topical two times a day    MEDICATIONS  (PRN):  acetaminophen  Suppository .. 650 milliGRAM(s) Rectal every 6 hours PRN Temp greater or equal to 38C (100.4F)  glycopyrrolate Injectable 0.4 milliGRAM(s) IV Push every 6 hours PRN Excessive secretions  HYDROmorphone  Injectable 0.2 milliGRAM(s) IV Push every 1 hour PRN Dyspnea, RR>24  HYDROmorphone  Injectable 0.5 milliGRAM(s) IV Push every 1 hour PRN Severe Pain (7 - 10)  HYDROmorphone  Injectable 0.2 milliGRAM(s) IV Push every 1 hour PRN Moderate Pain (4 - 6)  LORazepam   Injectable 0.2 milliGRAM(s) IV Push every 1 hour PRN Agitation, refractory dyspnea  ondansetron Injectable 4 milliGRAM(s) IV Push every 8 hours PRN Nausea and/or Vomiting    ITEMS UNCHECKED ARE NOT PRESENT    PRESENT SYMPTOMS: [x ]Unable to self-report  [ ]PAINADs [ ]RDOS  Source if other than patient:  [ ]Family   [ ]Team     Pain: [ ] yes [ ] no  QOL impact -   Location -                    Aggravating factors -  Quality -  Radiation -  Timing-  Severity (0-10 scale):  Minimal acceptable level (0-10 scale):     PAINAD Score:0-9 over last 24 hours  [email or web address]/[email or web address].pdf (Ctrl +  left click to view)    Dyspnea:                           [ ]Mild [ ]Moderate [ ]Severe    RDOS:  0 to 2  minimal or no respiratory distress   3  mild distress  4 to 6 moderate distress  >7 severe distress  https://[email or web address]/handouts/hen/Respiratory_Distress_Observation_Scale.pdf (Ctrl +  left click to view)    Anxiety:                             [ ]Mild [ ]Moderate [ ]Severe  Fatigue:                             [ ]Mild [ ]Moderate [ ]Severe  Nausea:                             [ ]Mild [ ]Moderate [ ]Severe  Loss of appetite:              [ ]Mild [ ]Moderate [ ]Severe  Constipation:                    [ ]Mild [ ]Moderate [ ]Severe    Other Symptoms:  [ ]All other review of systems negative    Palliative Performance Status Version 2:       10  %  [email or web address]/files/news/palliative_performance_scale_[email or web address].pdf    PHYSICAL EXAM:   Vital Signs Last 24 Hrs  T(C): 36.5 (03 Jul 2022 08:40), Max: 36.7 (02 Jul 2022 18:30)  T(F): 97.7 (03 Jul 2022 08:40), Max: 98 (02 Jul 2022 18:30)  HR: 102 (03 Jul 2022 08:40) (102 - 102)  BP: 128/71 (03 Jul 2022 08:40) (128/71 - 128/71)  BP(mean): --  RR: 22 (03 Jul 2022 08:40) (22 - 22)  SpO2: 88% (03 Jul 2022 08:40) (88% - 88%) I&O's Summary    02 Jul 2022 07:01  -  03 Jul 2022 07:00  --------------------------------------------------------  IN: 0 mL / OUT: 0 mL / NET: 0 mL      GENERAL: [ ] Cachexia  [ ]Alert  [ ]Oriented x   [x ]Lethargic  [ ]Unarousable  [ ]Verbal  [ ]Non-Verbal  Behavioral:   [ ] Anxiety  [ ] Delirium [ ] Agitation [ ] Other  HEENT: scleral icterus  [ ]Normal   [x ]Dry mouth   [ ]ET Tube/Trach  [ ]Oral lesions  PULMONARY:   [x ]Clear [ ]Tachypnea  [ ]Audible excessive secretions   [ ]Rhonchi        [ ]Right [ ]Left [ ]Bilateral  [ ]Crackles        [ ]Right [ ]Left [ ]Bilateral  [ ]Wheezing     [ ]Right [ ]Left [ ]Bilateral  [ ]Diminished BS [ ]Right [ ]Left [ ]Bilateral    CARDIOVASCULAR:    [x ]Regular [ ]Irregular [ ]Tachy  [ ]Mukul [ ]Murmur [ ]Other  GASTROINTESTINAL:  [ x]Soft  [ ]Distended   [ ]+BS  [ x]Non tender [ ]Tender  [ ]Other [ ]PEG [ ]OGT/ NGT   Last BM:    GENITOURINARY:  [ ]Normal [x ] Incontinent   [ ]Oliguria/Anuria   [x ]Ibarra  MUSCULOSKELETAL:   [ ]Normal   [ ]Weakness  [x ]Bed/Wheelchair bound [ ]Edema  NEUROLOGIC:   [ ]No focal deficits  [ ] Cognitive impairment  [ x] Dysphagia [x ]Dysarthria [ ] Paresis [ ]Other   SKIN:   [ ]Normal  [ ]Rash  [ ]Other  [ ]Pressure ulcer(s)  [ ]y [ ]n  Present on admission      CRITICAL CARE:  [ ] Shock Present  [ ]Septic [ ]Cardiogenic [ ]Neurologic [ ]Hypovolemic  [ ]  Vasopressors [ ]  Inotropes   [ ] Respiratory failure present [ ] Mechanical Ventilation [ ] Non-invasive ventilatory support [ ] High-Flow  [ ] Acute  [ ] Chronic [ ] Hypoxic  [ ] Hypercarbic [ ] Other  [x ] Other organ failure - liver, renal    LABS: no new labs      RADIOLOGY & ADDITIONAL STUDIES: no new imaging    PROTEIN CALORIE MALNUTRITION: [ ] mild [ ] moderate [ ] severe  [ ] underweight [ ] morbid obesity    https://www.andeal.org/vault/2440/web/files/ONC/Table_Clinical%20Characteristics%20to%20Document%20Malnutrition-White%20JV%20et%20al%956223.pdf    Height (cm): 175.3 (06-21-22 @ 13:36)  Weight (kg): 90.3 (06-21-22 @ 13:36)  BMI (kg/m2): 29.4 (06-21-22 @ 13:36)    [x ] PPSV2 < or = 30% [ ] significant weight loss [x ] poor nutritional intake [ ] anasarca   Artificial Nutrition [ ]     REFERRALS:   [ ]Chaplaincy  [ ] Hospice  [ ]Child Life  [ x]Social Work  [ ]Case management [ ]Holistic Therapy [ ] Physical Therapy [ ] Dietary   Goals of Care Document: VANNA Ng (06-22-22 @ 16:13)  Goals of Care Conversation:   Participants:  · Participants  Patient    Advance Directives:  · Does patient have Advance Directive  Yes  · Indicate Type  Health Care Proxy (HCP)  · Agent's Name  Michelle Palacios dtnoni  · Phone #  [phone]  · If HCP is not on chart, identify the persons name and phone number contacted to bring in document  Asked pt ot have a copy of form provided for medical records  · Are any of the items on the chart  No  · Does Patient Have a Surrogate  Yes  · Surrogate's Name  Michelle mullenr  · Surrogate's Phone Number  [phone]  · Does the Patient have a Court Appointed Guardian ([id number])  No  · Caregiver:  yes  · Name  Michelle Palacios dtr  · Phone Number  [phone]    Conversation Discussion:  · Conversation  Chaplaincy Referral; Palliative Care Referral; HCP code status tube feeding dialysis  · Conversation Details  Introduced self and role of the PCE.  Patient was seen for goals of care conversation and watched what is Palliative Care.  No documents found on patient window search.  Pt reports his spouse Michelle is his HCP and his daughter Coretta would be his alternate.  Pt would like to return home after hospital stay.  Pt is currently waiting on test results to determine his next course of treatment.  He is aware that he would not want to start dialysis if it is necessary.  He is aware he would never want to be on tube feedings and wants his family to bring him a BBQ meal including cheeseburgers.  He does believe he can have these meals once in a while regardless of medical condition.   He currently is taking nutritional supplements and when talking about temporary tube feedings and supplemental feedings he would not want to attempt tube feeding. Pt is comfortable with hydration via IV and antibiotics.     CPR/ intubation procedures and possible complications explained.  Pt wants to remain full code with CPR and intubation.  He will await test results if he changes him idea of survival.  He is interested in Palliative care and will discuss with his spouse and children.      Pt instructed to provide a copy of HCP.  Contact information for further needs provided.  No Rastafari exemptions noted and would like Chaplaincy care and prayer when in hospital setting.  Has been seen already. Code NAYR provide.  Enjoys donating to [facility].      Susan Ng RN  Palliative Care Educator  [phone] cell    What Matters Most To Patient and Family:  · What matters most to patient and family  remain full code and will determine limitations after test results.  does not want dialysis or tube feedings    Personal Advance Directives Treatment Guidelines:   Treatment Guidelines:  · Decision Maker  Patient  · Treatment Guidelines  No artificial nutrition  · Treatment Guideline Comments  CPR Intubation  no dialysis    Location of Discussion:   Time Spent on Advance Care Planning:  I spent 45 (in minutes) on advance care planning services with the patient.  This time is separate and distinct from any other care management services provided on this date.    Location of Discussion:  · Location of discussion  Face to face      Electronic Signatures:  Susan Ng (RN)  (Signed 22-Jun-2022 16:27)  	Authored: Goals of Care Conversation, Personal Advance Directives Treatment Guidelines, Location of Discussion      Last Updated: 22-Jun-2022 16:27 by Susan Ng (RN)

## 2022-07-03 NOTE — PROGRESS NOTE ADULT - PROBLEM SELECTOR PROBLEM 2
Paroxysmal atrial fibrillation
Elevated liver function tests
Elevated liver function tests
Paroxysmal atrial fibrillation
Paroxysmal atrial fibrillation
Elevated liver function tests
Metastatic adenocarcinoma
Elevated liver function tests
Metastatic adenocarcinoma
Elevated liver function tests

## 2022-07-03 NOTE — PROGRESS NOTE ADULT - PROBLEM SELECTOR PLAN 1
-Renal f/up appreciated  -BMP
-Renal f/up appreciated  -BMP
-Liver biopsy: AdenoCa  Onco f/up noted
Pt with worsening dyspnea in setting of metastatic adenocarcinoma and debility. Used x2 PRNs Dilaudid 0.2mg IV in past 24 hours for dyspnea.  - Dilaudid 0.2mg IV q1h PRN dyspnea  - Ativan 0.2mg IV q2h PRN refractory dyspnea.
-Liver biopsy: AdenoCa  Onco f/up noted
-Renal f/up appreciated  -BMP
abd sono w/ findings suggestive of medical renal disease; suspect pre-renal vs. hepatorenal , UA neg for infection   - urine Na/ creatinine   -Renal eval appreciated  - hold HCTZ   - Hold Valsartan   - Hold metoprolol , as may be contributing to hepatorenal  - monitor K
-Liver biopsy: AdenoCa  Onco f/up noted  Palliative care eval appreciated. Tx to PCU when bed is available
-Renal f/up appreciated  -BMP
Pt with worsening dyspnea in setting of metastatic adenocarcinoma and debility. Used x1 PRNs Dilaudid 0.2mg IV in past 24 hours for dyspnea.  - Dilaudid 0.2mg IV q1h PRN dyspnea  - Ativan 0.2mg IV q2h PRN refractory dyspnea.
-Renal f/up appreciated  -BMP
abd sono w/ findings suggestive of medical renal disease; suspect pre-renal vs. hepatorenal , UA neg for infection     -Renal f/up appreciated  -BMP
-Renal f/up appreciated  -BMP
abd sono w/ findings suggestive of medical renal disease; suspect pre-renal vs. hepatorenal , UA neg for infection     -Renal f/up appreciated  -BMP
abd sono w/ findings suggestive of medical renal disease; suspect pre-renal vs. hepatorenal , UA neg for infection     -Renal f/up appreciated  -BMP
-Renal f/up appreciated  -BMP

## 2022-07-03 NOTE — PROGRESS NOTE ADULT - PROBLEM SELECTOR PLAN 5
- holding HCTZ and valsartan in setting of ROBERT   - Holding metoprolol   - can continue amlodipine     BPH   - Dutasteride
Continue symptom-directed care in PCU.
- holding HCTZ and valsartan in setting of ROBERT   - Holding metoprolol   - can continue amlodipine     BPH   - Dutasteride
Continue symptom-directed care in PCU.

## 2022-07-03 NOTE — PROGRESS NOTE ADULT - PROVIDER SPECIALTY LIST ADULT
Nephrology
Heme/Onc
Hepatology
Hepatology
Internal Medicine
Intervent Radiology
Nephrology
Heme/Onc
Hepatology
Nephrology
Heme/Onc
Heme/Onc
Hepatology
Internal Medicine
Nephrology
Internal Medicine
Palliative Care
Internal Medicine
Internal Medicine
Palliative Care
Internal Medicine

## 2022-07-03 NOTE — PROGRESS NOTE ADULT - PROBLEM SELECTOR PLAN 3
- echocardiogram reviewed
- echocardiogram reviewed
BNP elevated ,  CXR clear , no evidence for coronary obstruction , mild troponin elevation likely 2/2 poor renal clearance ,  low suspicion for PE as patient on Eliquis   - echocardiogram reviewed
Pt with functional decline over past few weeks and accelerated decline over past few days. PPSV 20%  - Supportive care.
BNP elevated ,  CXR clear , no evidence for coronary obstruction , mild troponin elevation likely 2/2 poor renal clearance ,  low suspicion for PE as patient on Eliquis   - echocardiogram
BNP elevated ,  CXR clear , no evidence for coronary obstruction , mild troponin elevation likely 2/2 poor renal clearance ,  low suspicion for PE as patient on Eliquis   - echocardiogram reviewed
- echocardiogram reviewed
BNP elevated ,  CXR clear , no evidence for coronary obstruction , mild troponin elevation likely 2/2 poor renal clearance ,  low suspicion for PE as patient on Eliquis   - echocardiogram reviewed
- echocardiogram reviewed
- echocardiogram reviewed
BNP elevated ,  CXR clear , no evidence for coronary obstruction , mild troponin elevation likely 2/2 poor renal clearance ,  low suspicion for PE as patient on Eliquis   - echocardiogram reviewed
- echocardiogram reviewed
- echocardiogram reviewed
BNP elevated ,  CXR clear , no evidence for coronary obstruction , mild troponin elevation likely 2/2 poor renal clearance ,  low suspicion for PE as patient on Eliquis   - echocardiogram reviewed
- echocardiogram reviewed
More lethargic on todays assessment. PPSV 10%  - Supportive care.
- echocardiogram reviewed

## 2022-07-03 NOTE — PROGRESS NOTE ADULT - PROBLEM SELECTOR PROBLEM 4
Paroxysmal atrial fibrillation
Advanced care planning/counseling discussion
Paroxysmal atrial fibrillation
Advanced care planning/counseling discussion
Paroxysmal atrial fibrillation

## 2022-07-03 NOTE — PROGRESS NOTE ADULT - PROBLEM SELECTOR PLAN 2
resume  Eliquis
resume  Eliquis
Newly diagnosed adenocarcinoma infiltrating b/l lobes of liver.  - Pt opting against initiating treatment. Opting instead for symptom focused care.
cirrhotic morphology on US abdomen   , no prior h/o liver dz : MELD 25   - MRCP: Hepatic neoplasm  - Hepatology f/up appreciated  - hold statin  -S/p LVP and Liver biopsy
Newly diagnosed adenocarcinoma infiltrating b/l lobes of liver.  - Pt opting against initiating treatment. Opting instead for symptom focused care.
cirrhotic morphology on US abdomen   , no prior h/o liver dz : MELD 25   - MRCP: Hepatic neoplasm  - Hepatology consult appreciated  - hold statin  -MRI abd W W/o gadolinium
cirrhotic morphology on US abdomen   , no prior h/o liver dz : MELD 25   - MRCP   - Acute hepatitis panel   - Hepatology consult appreciated  - hold satin
cirrhotic morphology on US abdomen   , no prior h/o liver dz : MELD 25   - MRCP: Hepatic neoplasm  - Hepatology consult appreciated  - hold statin  -MRI abd W W/o gadolinium
cirrhotic morphology on US abdomen   , no prior h/o liver dz : MELD 25   - MRCP: Hepatic neoplasm  - Hepatology f/up appreciated  - hold statin  -S/p LVP and Liver biopsy
resume  Eliquis
cirrhotic morphology on US abdomen   , no prior h/o liver dz : MELD 25   - MRCP: Hepatic neoplasm  - Hepatology consult appreciated  - hold statin  -MRI abd W W/o gadolinium
cirrhotic morphology on US abdomen   , no prior h/o liver dz : MELD 25   - MRCP: Hepatic neoplasm  - Hepatology f/up appreciated  - hold statin  -S/p LVP and Liver biopsy
cirrhotic morphology on US abdomen   , no prior h/o liver dz : MELD 25   - MRCP: Hepatic neoplasm  - Hepatology consult appreciated  - hold statin  -MRI abd W W/o gadolinium
cirrhotic morphology on US abdomen   , no prior h/o liver dz : MELD 25   - MRCP: Hepatic neoplasm  - Hepatology f/up appreciated  - hold statin  -S/p LVP and Liver biopsy
cirrhotic morphology on US abdomen   , no prior h/o liver dz : MELD 25   - MRCP: Hepatic neoplasm  - Hepatology consult appreciated  - hold statin  -MRI abd W W/o gadolinium
cirrhotic morphology on US abdomen   , no prior h/o liver dz : MELD 25   - MRCP: Hepatic neoplasm  - Hepatology f/up appreciated  - hold statin  -S/p LVP and Liver biopsy
cirrhotic morphology on US abdomen   , no prior h/o liver dz : MELD 25   - MRCP: Hepatic neoplasm  - Hepatology f/up appreciated  - hold statin  -For LVP and Liver biopsy tomorrow

## 2022-07-03 NOTE — PROGRESS NOTE ADULT - PROBLEM SELECTOR PROBLEM 3
Exertional dyspnea
Debility
Exertional dyspnea
Debility
Exertional dyspnea

## 2022-07-04 NOTE — DISCHARGE NOTE FOR THE EXPIRED PATIENT - HOSPITAL COURSE
77 yo M with Afib (on Eliquis), HTN,  HLD p/w shortness of breath found to have ROBERT. Workup found metastatic adenocarcinoma infiltrating his liver and hospital course was c/b worsening renal function. Oncology discussed treatment options but given patient's functional decline during hospital stay and the palliative nature of treatment options, patient opted against treatment. He also opted against HD. Geriatrics and Palliative team consulted for GOC and patient opted for comfort measures. Patient transferred to PCU and  on 22. 75 yo M with Afib (on Eliquis), HTN,  HLD p/w shortness of breath found to have ROBERT. Workup found metastatic adenocarcinoma infiltrating his liver and hospital course was c/b worsening renal function. Oncology discussed treatment options but given patient's functional decline during hospital stay and the palliative nature of treatment options, patient opted against treatment. He also opted against HD. Geriatrics and Palliative team consulted for GOC and patient opted for comfort measures. Patient transferred to PCU and  on 22. No autopsy requested

## 2022-07-11 LAB — NON-GYNECOLOGICAL CYTOLOGY STUDY: SIGNIFICANT CHANGE UP

## 2022-07-16 LAB
CULTURE RESULTS: SIGNIFICANT CHANGE UP
SPECIMEN SOURCE: SIGNIFICANT CHANGE UP
